# Patient Record
Sex: FEMALE
[De-identification: names, ages, dates, MRNs, and addresses within clinical notes are randomized per-mention and may not be internally consistent; named-entity substitution may affect disease eponyms.]

---

## 2018-07-19 ENCOUNTER — HOSPITAL ENCOUNTER (INPATIENT)
Dept: HOSPITAL 42 - ED | Age: 48
LOS: 5 days | Discharge: HOME | DRG: 88 | End: 2018-07-24
Attending: INTERNAL MEDICINE | Admitting: INTERNAL MEDICINE
Payer: COMMERCIAL

## 2018-07-19 VITALS — BODY MASS INDEX: 43 KG/M2

## 2018-07-19 DIAGNOSIS — G47.00: ICD-10-CM

## 2018-07-19 DIAGNOSIS — D64.9: ICD-10-CM

## 2018-07-19 DIAGNOSIS — I10: ICD-10-CM

## 2018-07-19 DIAGNOSIS — J45.901: ICD-10-CM

## 2018-07-19 DIAGNOSIS — F13.20: ICD-10-CM

## 2018-07-19 DIAGNOSIS — Z82.49: ICD-10-CM

## 2018-07-19 DIAGNOSIS — Z80.3: ICD-10-CM

## 2018-07-19 DIAGNOSIS — G89.29: ICD-10-CM

## 2018-07-19 DIAGNOSIS — M19.90: ICD-10-CM

## 2018-07-19 DIAGNOSIS — J44.1: Primary | ICD-10-CM

## 2018-07-19 DIAGNOSIS — E66.01: ICD-10-CM

## 2018-07-19 DIAGNOSIS — F11.20: ICD-10-CM

## 2018-07-19 DIAGNOSIS — E11.9: ICD-10-CM

## 2018-07-19 DIAGNOSIS — F43.23: ICD-10-CM

## 2018-07-19 LAB
ALBUMIN SERPL-MCNC: 4.7 G/DL (ref 3–4.8)
ALBUMIN/GLOB SERPL: 1.4 {RATIO} (ref 1.1–1.8)
ALT SERPL-CCNC: 55 U/L (ref 7–56)
ARTERIAL BLOOD GAS HEMOGLOBIN: 10.7 G/DL (ref 11.7–17.4)
ARTERIAL BLOOD GAS O2 SAT: 99.6 % (ref 95–98)
ARTERIAL BLOOD GAS PCO2: 32 MM/HG (ref 35–45)
ARTERIAL BLOOD GAS TCO2: 17.9 MMOL.L (ref 22–28)
AST SERPL-CCNC: 59 U/L (ref 14–36)
BASOPHILS # BLD AUTO: 0.03 K/MM3 (ref 0–2)
BASOPHILS NFR BLD: 0.2 % (ref 0–3)
BUN SERPL-MCNC: 21 MG/DL (ref 7–21)
CALCIUM SERPL-MCNC: 9.1 MG/DL (ref 8.4–10.5)
CK MB SERPL-MCNC: 4.8 NG/ML (ref 0–3.6)
CK MB SERPL-MCNC: 6.3 NG/ML (ref 0–3.6)
CK MB%: 0.4 % (ref 2.5–3)
EOSINOPHIL # BLD: 0.4 10*3/UL (ref 0–0.7)
EOSINOPHIL NFR BLD: 2.4 % (ref 1.5–5)
ERYTHROCYTE [DISTWIDTH] IN BLOOD BY AUTOMATED COUNT: 15.7 % (ref 11.5–14.5)
GFR NON-AFRICAN AMERICAN: 59
GRANULOCYTES # BLD: 13.31 10*3/UL (ref 1.4–6.5)
GRANULOCYTES NFR BLD: 74.4 % (ref 50–68)
HCO3 BLDA-SCNC: 16.9 MMOL/L (ref 21–28)
HGB BLD-MCNC: 11.1 G/DL (ref 12–16)
INHALED O2 CONCENTRATION: 50 %
LYMPHOCYTES # BLD: 2.9 10*3/UL (ref 1.2–3.4)
LYMPHOCYTES NFR BLD AUTO: 16.3 % (ref 22–35)
MCH RBC QN AUTO: 28.2 PG (ref 25–35)
MCHC RBC AUTO-ENTMCNC: 32 G/DL (ref 31–37)
MCV RBC AUTO: 88.1 FL (ref 80–105)
MONOCYTES # BLD AUTO: 1.2 10*3/UL (ref 0.1–0.6)
MONOCYTES NFR BLD: 6.7 % (ref 1–6)
O2 CAP BLDA-SCNC: 15.2 ML/DL (ref 16–24)
O2 CT BLDA-SCNC: 15.1 ML/DL (ref 15–23)
PH BLDA: 7.33 [PH] (ref 7.35–7.45)
PLATELET # BLD: 411 10^3/UL (ref 120–450)
PMV BLD AUTO: 8.5 FL (ref 7–11)
PO2 BLDA: 215 MM/HG (ref 80–100)
RBC # BLD AUTO: 3.94 10^6/UL (ref 3.5–6.1)
TROPONIN I SERPL-MCNC: < 0.01 NG/ML
TROPONIN I SERPL-MCNC: < 0.01 NG/ML
WBC # BLD AUTO: 17.9 10^3/UL (ref 4.5–11)

## 2018-07-19 RX ADMIN — Medication SCH MG: at 22:54

## 2018-07-19 NOTE — RAD
Date of service: 



07/19/2018



HISTORY:

r/o infiltrate  



COMPARISON:

No prior. 



FINDINGS:



LUNGS:

No active pulmonary disease.



PLEURA:

No significant pleural effusion identified, no pneumothorax apparent.



CARDIOVASCULAR:

Cardiomegaly.  No evidence of acute, significant cardiovascular 

disease. 



OSSEOUS STRUCTURES:

No significant abnormalities.



VISUALIZED UPPER ABDOMEN:

Normal.



OTHER FINDINGS:

None.



IMPRESSION:

No active disease.

## 2018-07-19 NOTE — CARD
--------------- APPROVED REPORT --------------





Date of service: 07/19/2018



EKG Measurement

Heart [id number]

OH [id number]

[id number]

[id number]

[id number]



<Conclusion>

Sinus tachycardia

Minimal voltage criteria for LVH, may be normal variant

Anteroseptal infarct, age undetermined

Abnormal ECG

## 2018-07-19 NOTE — PCM.RRT
<Osvaldo Israel - Last Filed: 07/19/18 20:42>





RRT Nurse Assessment





- Situation


Date: 07/19/18


Time RRT was called: 17:00


RRT Responder Arrival Time: 17:05


RRT Location:: [address] [place] [address]


RRT Reason for Call: Respiratory Distress


RRT Called By: RN





- IV


IV Inserted during RRT?: No





- Respiratory


Oxygen Delivery Method: Face Mask @%


Oxygen Flow Rate: 50


Received Nebulizer Treatments:: Yes


Was the Patient Ventilated with Bag/Mask 100% O2?: No


Secretions Suctioned?: No


Was the Patient Intubated?: No


Was the Patient Placed on a Ventilator?: No





- Medication


Medications Administered During RRT: Solumedrol.  Albuterol.  DuoNebs





- Diagnostic Test Ordered


EKG: Yes


Chest X-Ray: No


CT Scan: No





- Stat Labs Ordered


RRT Stat Labs Ordered: TROPONIN, ABG


CPR started during RRT?: No





- Vital Signs


Vital Sign: 


Rapid Response Vital Sign





Blood Pressure                   150/92


Pulse Rate                       120


Respiratory Rate                 24


Temperature                      98.2 F


Oxygen Saturation                100











- Time RRT Ended


Time RRT Ended: 19:25





- Vital Signs at end of RRT


Vital Signs at end of RRT: 


Rapid Response End Vital Sign





Blood Pressure                   148/89


Pulse Rate                       110


Respiratory Rate                 19


Temperature                      98.6 F











- Recommendations


Notifications: Attending Physician, Consultations, Family or Designated 

Caregiver





I.Reason for RRT





- A) Acute Change in Patient:


(Select all that apply): Staff member or family is worried about patient


Subjective: 


Rapid response team was called to evaluate pt for worsening shortness of 

breath. Per staff, pt was hyperventilating and was having difficulty breathing. 

Pt was a recent admission from the ED this am. Pt was complaining also of 

associated chest tightness, non-radiating. Pt was placed on venti mask at 50 L 

O2, was given duonebs and xopenex x3 with improvement. Pt received 1x dose 

solumedrrol 125 mg in ED earlier in day, then received 60 mg solumedrol x1 

during rapid response. Spoke with PMD Dr. Van, who agreed with intervention 

and recommended pt be evaluated by ICU intensivist. Dr. Hernandez notified. Dr. Alonzo present at bedside during rapid response. Per discussion with pt's son, 

pt is trying to kill herself and has been taking her late 's pain meds 

which he was prescribed for his cancer. Pt denied suicidal ideation/homicidal 

ideation at time of rapid response. 1:1 watch and psych consult ordered. EKG 

was sinus tachycardia, otherwise no acute St-T changes. 





VS: 154/98, P 131, SpO2 100% on 50 L venti mask


Gen: mild acute distress, hyperventilating at bedside


HEENT: NCAT, PERRLA


CV: normal s1/s2, tachycardic


Resp: diffuse wheezes heard in all lung fields, increased respirations (

improved s/p duonebs, oxygen, and xopenex)


Abd: soft, NTND, normal bowel sounds x4


Ext: normal capillary refill, 1+ nonpitting edema in lower exts b/l





 





- Neurological Status


(Select all that apply): Alert, Responsive, Oriented, Verbal, Follows Commands





- Respiratory


Oxygen Delivery Method: Face Mask @%


Oxygen Flow Rate: 50





- Constitutional


Additional Comments: 


Mild acute distress





- Head


Head Exam: ATRAUMATIC, NORMAL INSPECTION, NORMOCEPHALIC





- Eyes


Eye Exam: EOMI, Normal appearance, PERRL





- Respiratory Exam


Additional comments: 


Rapid breathing pattern, wheezes auscultated in all lung fields





- Cardiovascular Exam


Cardiovascular Exam: Tachycardia, +S1, +S2





- GI/Abdominal Exam


GI & Abdominal Exam: Soft, Normal Bowel Sounds





- Neurological Exam


Neurological Exam: Alert, Awake, Oriented x3





- Extremities Exam


Extremities Exam: Full ROM, Normal Capillary Refill, Normal Inspection


Additional comments: 


1+ lower extremity edema b/l, nonpitting





Plan





- Assessment of Findings&Treatment Plan


47 y o female PMhx HTN, asthma, admitted for asthma exacerbation, presenting 

with worsening shortness of breath. Likely 2/2 asthma exacerbation vs. anxiety 

attack. Pt's clinical status improved s/p duonebs x3 and xopenex. EKG showed 

sinus tachycardia. Pt placed on 1:1 watch and psych consult ordered. ABG 

performed, pH 7.33, pC02 32, pO2 215, HCO3 16.9. Serial troponins ordered, to 

be trended overnight. Plan d/w Dr. Alonzo, attending. Pending ICU consult as per 

Dr. Van, Dr. Hernandez aware. Dr. Van notified, agrees with management. 





<Luz Maria Alonzo - Last Filed: 07/19/18 22:49>





RRT Nurse Assessment





- Vital Signs


Vital Sign: 


Rapid Response Vital Sign





Blood Pressure                   150/92


Pulse Rate                       120


Respiratory Rate                 24


Temperature                      98.2 F


Oxygen Saturation                100











- Vital Signs at end of RRT


Vital Signs at end of RRT: 


Rapid Response End Vital Sign





Blood Pressure                   148/89


Pulse Rate                       110


Respiratory Rate                 19


Temperature                      98.6 F











Attending/Attestation





- Attestation


I have personally seen and examined this patient.: Yes


I have fully participated in the care of the patient.: Yes


I have reviewed all pertinent clinical information, including history, physical 

exam and plan: Yes


Notes (Text): 





07/19/18 22:49


Patient was seen.


Agree with documentation by resident.

## 2018-07-19 NOTE — ED PDOC
Arrival/HPI





- General


Time Seen by Provider: 07/19/18 13:28


Historian: Patient





- History of Present Illness


Narrative History of Present Illness (Text): 





07/19/18 13:29





47 year old female, with past medical history of asthma, presents to the ED 

complaining of shortness of breath associated with cough since prior to 

arrival. [place], 

prompting her to present to the Ed for medical evaluation. Patient states her 

last episode of asthma exacerbation was many years ago and is currently not on 

any breathing treatment. Patient denies any chest pain, fever, chills, nausea, 

vomiting, abdominal pain, palpitations or any other complaints. Patient denies 

smoking cigarettes. 





Time/Duration: Prior to Arrival


Symptom Onset: Gradual


Symptom Course: Unchanged


Activities at Onset: Light


Context: Other ([facility])





Past Medical History





- Provider Review


Nursing Documentation Reviewed: Yes





- Infectious Disease


Hx of Infectious Diseases: None





- Tetanus Immunization


Tetanus Immunization: Unknown





- Past Medical History


Past Medical History: No Previous





- Psychiatric


Hx Depression: No


Hx Emotional Abuse: No


Hx Physical Abuse: No


Hx Substance Use: No





- Past Surgical History


Past Surgical History: No Previous





- Suicidal Assessment


Feels Threatened In Home Enviroment: No





Family/Social History





- Physician Review


Nursing Documentation Reviewed: Yes


Family/Social History: No Known Family HX


Hx Alcohol Use: No


Hx Substance Use: No


Hx Substance Use Treatment: No





Allergies/Home Meds


Allergies/Adverse Reactions: 


Allergies





No Known Allergies Allergy (Verified 04/14/13 05:37)


 











Review of Systems





- Physician Review


All systems were reviewed & negative as marked: Yes





- Review of Systems


Constitutional: absent: Fevers


Respiratory: SOB, Cough


Cardiovascular: absent: Chest Pain, Palpitations


Gastrointestinal: absent: Abdominal Pain, Nausea, Vomiting





Physical Exam


Vital Signs Reviewed: Yes


Vital Signs











  Temp Pulse Resp BP Pulse Ox


 


 07/19/18 17:50   115 H  18  145/87  98


 


 07/19/18 17:04   109 H  18  149/90  93 L


 


 07/19/18 14:51   112 H  18  152/94 H  93 L


 


 07/19/18 13:50    18  


 


 07/19/18 13:40  98.7 F    


 


 07/19/18 13:32   123 H  24  164/99 H  100











Temperature: Afebrile


Blood Pressure: Hypertensive


Pulse: Tachycardic


Respiratory Rate: Normal


Appearance: Positive for: Well-Appearing, Non-Toxic


Pain Distress: None


Mental Status: Positive for: Alert and Oriented X 3





- Systems Exam


Head: Present: Atraumatic, Normocephalic


Pupils: Present: PERRL


Extroacular Muscles: Present: EOMI


Conjunctiva: Present: Normal


Mouth: Present: Moist Mucous Membranes


Neck: Present: Normal Range of Motion


Respiratory/Chest: Present: Good Air Exchange, Wheezes (prolonged expiratory 

wheeze ).  No: Respiratory Distress, Accessory Muscle Use


Cardiovascular: Present: Regular Rate and Rhythm, Normal S1, S2.  No: Murmurs


Abdomen: No: Tenderness, Distention, Peritoneal Signs


Back: Present: Normal Inspection


Upper Extremity: Present: Normal Inspection.  No: Cyanosis, Edema


Lower Extremity: Present: Normal Inspection.  No: Edema


Neurological: Present: GCS=15, CN II-XII Intact, Speech Normal


Skin: Present: Warm, Dry, Normal Color.  No: Rashes


Psychiatric: Present: Alert, Oriented x 3, Normal Insight, Normal Concentration





Medical Decision Making


ED Course and Treatment: 





07/19/18 13:28


Impression: 47 year old female presents to the ED for shortness of breath 

associated with cough since prior to arrival. 





Plan:


-- EKG


-- Labs


-- CXR


-- Albuterol 


-- solumedrol





Progress Note: 





07/19/18 13:52


EKG: Ordered, reviewed, and independently interpreted the EKG.


Rate : 122 BPM


Rhythm : Sinus Tachycardia


Interpretation : Normal axis; normal interval; non-specific ST/T wave changes. 





07/19/18 16:49


Patient ambulating in ED and talking on phone.  States she is feeling better.  

Patient continues to wheeze.  Discussed case with Dr. Van, who is aware and 

agrees with Emergency department management plan to admit patient to remote 

Telemetry for further observation.





- Lab Interpretations


Lab Results: 








 07/19/18 13:40 





 07/19/18 13:40 





 Lab Results





07/19/18 13:40: Sodium 140, Potassium 4.5, Chloride 102, Carbon Dioxide 23, 

Anion Gap 19, BUN 21, Creatinine 1.0, Est GFR (African Amer) > 60, Est GFR (Non-

Af Amer) 59, Random Glucose 118 H, Calcium 9.1, Magnesium 2.2, Total Bilirubin 

0.6, AST 59 H, ALT 55, Alkaline Phosphatase 103, Lactate Dehydrogenase 872 H, 

Total Creatine Kinase 1528 H, CK-MB (CK-2) 6.3 H, CK-MB (CK-2) % 0.4 L, 

Troponin I < 0.01, Total Protein 8.1, Albumin 4.7, Globulin 3.4, Albumin/

Globulin Ratio 1.4


07/19/18 13:40: WBC 17.9 H, RBC 3.94, Hgb 11.1 L, Hct 34.7 L, MCV 88.1, MCH 28.2

, MCHC 32.0, RDW 15.7 H, Plt Count 411, MPV 8.5, Gran % 74.4 H, Lymph % (Auto) 

16.3 L, Mono % (Auto) 6.7 H, Eos % (Auto) 2.4, Baso % (Auto) 0.2, Gran # 13.31 H

, Lymph # (Auto) 2.9, Mono # (Auto) 1.2 H, Eos # (Auto) 0.4, Baso # (Auto) 0.03











- RAD Interpretation


Narrative RAD Interpretations (Text): 





07/19/18 14:35


Chest X-ray reviewed by radiologist, shows: no active disease.


Radiology Orders: 








07/19/18 13:29


CHEST PORTABLE [RAD] Stat 











: Radiologist





- EKG Interpretation


Interpreted by ED Physician: Yes


Type: 12 lead EKG





- Medication Orders


Current Medication Orders: 








Ipratropium Bromide (Atrovent)  0.5 mg IH [id number] PRN


   PRN Reason: Shortness of Breath


Levalbuterol HCl (Xopenex)  1.25 mg IH [id number] JEF


Levalbuterol HCl (Xopenex)  1.25 mg IH [id number] PRN


   PRN Reason: Shortness of Breath


Methylprednisolone (Solu-Medrol)  60 mg IVP Q12 JEF


Oxycodone/Acetaminophen (Percocet 5/325 Mg Tab)  1 tab PO Q6H PRN


   PRN Reason: Pain, moderate (4-7)


   Stop: 07/22/18 19:47





Discontinued Medications





Albuterol/Ipratropium (Duoneb 3 Mg/0.5 Mg (3 Ml) Ud)  3 ml IH STAT STA


   Stop: 07/19/18 13:29


   Last Admin: 07/19/18 13:41  Dose: 3 ml





Alprazolam (Xanax)  0.5 mg PO STAT STA


   PRN Reason: Protocol


   Stop: 07/19/18 19:25


   Last Admin: 07/19/18 19:36  Dose: 0.5 mg





Behavioural


 Document     07/19/18 19:36  FC  (Rec: 07/19/18 19:36  FC  [id number])


     Maintenance


      Maintenance Dose                           No


     Nonmedicinal


      Nonmedicinal Interventions                 Redirect


     Behavior


      Behavior for Medication:                   Anxiety


                                                 Continuous crying/screaming/


                                                 yelling


Re-Assess: Reassess Psych Meds


 Document     07/19/18 20:36  FC  (Rec: 07/19/18 20:52  FC  [id number])


      Reassess Psych Med                         Effective





Magnesium 2 gm/50 ml NS (Magnesium Sulfate 2 Gm/50 Ml Ns)  2 gm in 50 mls @ 102 

mls/hr IVPB ONCE ONE


   Stop: 07/19/18 13:57


   Last Admin: 07/19/18 14:17  Dose: 102 mls/hr





eMAR Start Stop


 Document     07/19/18 14:17  SF  (Rec: 07/19/18 14:17  SF  [id number])


     Intravenous Solution


      Start Date                                 07/19/18


      Start Time                                 14:17


      End Date                                   07/19/18


      End time                                   14:47


      Total Infusion Time                        30





Azithromycin (Zithromax 500mg In Ns)  500 mg in 250 mls @ 167 mls/hr IVPB STAT 

STA


   PRN Reason: Protocol


   Stop: 07/19/18 18:26


   Last Admin: 07/19/18 17:49  Dose: 167 mls/hr





eMAR Start Stop


 Document     07/19/18 17:49  SF  (Rec: 07/19/18 17:50  SF  [facility])


     Intravenous Solution


      Start Date                                 07/19/18


      Start Time                                 17:49


      End Date                                   07/19/18


      End time                                   19:20


      Total Infusion Time                        91





Levalbuterol HCl (Xopenex)  1.25 mg IH STAT STA


   Stop: 07/19/18 19:17


   Last Admin: 07/19/18 19:28  Dose: 1.25 mg





Methylprednisolone (Solu-Medrol)  125 mg IVP STAT STA


   Stop: 07/19/18 13:29


   Last Admin: 07/19/18 13:41  Dose: 125 mg





IVP Administration


 Document     07/19/18 13:41  GMD  (Rec: 07/19/18 13:41  GMD  [id number])


     Charges for Administration


      # of IVP Administrations                   1





Methylprednisolone (Solu-Medrol)  60 mg IVP STAT STA


   Stop: 07/19/18 19:10


   Last Admin: 07/19/18 19:26  Dose: 60 mg





IVP Administration


 Document     07/19/18 19:26  RAMONA  (Rec: 07/19/18 19:26  [facility]  [id number])


     Charges for Administration


      # of IVP Administrations                   1





Pneumococcal Polyvalent Vaccine (Pneumovax 23 Vaccine)  0.5 ml IM .ONCE ONE


   Stop: 07/19/18 21:19











- Scribe Statement


The provider has reviewed the documentation as recorded by the Scribe


Lito Perez.





All medical record entries made by the Scribe were at my direction and 

personally dictated by me. I have reviewed the chart and agree that the record 

accurately reflects my personal performance of the history, physical exam, 

medical decision making, and the department course for this patient. I have 

also personally directed, reviewed, and agree with the discharge instructions 

and disposition.





Disposition/Present on Arrival





- Present on Arrival


Any Indicators Present on Arrival: No


History of DVT/PE: No


History of Uncontrolled Diabetes: No


Urinary Catheter: No


History Surgical Site Infection Following: None





- Disposition


Have Diagnosis and Disposition been Completed?: Yes


Diagnosis: 


 Asthma exacerbation





Disposition: HOSPITALIZED


Disposition Time: 15:00


Condition: STABLE

## 2018-07-19 NOTE — CP.PCM.CON
History of Present Illness





- History of Present Illness


History of Present Illness: 





ICU consult note for Dr. Mary Acosta PGY2





Chief Complaint: Dyspnea


HPI: Patient is a 47 F with past history significant for asthma for which she 

was never intubated and currently not medicated for as well as present history 

of opioid abuse presenting with complains of dyspnea and productive cough which 

has been going on for the past few days. Patient states her symptoms have been 

going on for the past few days but were exacerbated today during the burial of 

her . In the ED patient was given STAT duonebs, magnesium, 125 mg 

solumedrol IVP, and one dose of azithromycin. As per patient's son, patient has 

been addicted to opioids since her  was receiving treatment for his 

cancer and is currently consuming xanax/oxycodone/percocet. Patient was taking 

's pain meds in excess as of late in attempts to commit suicide. Due to 

patient's acute respiratory distress, Rapid response was called for which 

patient was given 3 rounds of xopenex and 60 mg IVP solumedrol and Xanax which 

appeared to have help alleviate patient's symptoms. 

















Review of Systems





- Constitutional


Constitutional: absent: Anorexia, Chills, Fever





- EENT


Eyes: absent: Blurred Vision





- Cardiovascular


Cardiovascular: absent: Chest Pain (chest tightness)





- Respiratory


Respiratory: Cough, Dyspnea, Chest Congestion, Change in Mucous Color (green)





- Gastrointestinal


Gastrointestinal: absent: Abdominal Pain, Bloating





- Genitourinary


Genitourinary: absent: Hematuria





- Neurological


Neurological: absent: Dizziness, Numbness





- Psychiatric


Psychiatric: Anxiety, Suicidal Ideation





- Endocrine


Endocrine: Fatigue





Past Patient History





- Infectious Disease


Hx of Infectious Diseases: None





- Tetanus Immunizations


Tetanus Immunization: Unknown





- Past Social History


Smoking Status: Never Smoked





- CARDIAC


Hx Hypertension: Yes





- PULMONARY


Hx Asthma: Yes





- MUSCULOSKELETAL/RHEUMATOLOGICAL


Other/Comment: R leg problem





- PSYCHIATRIC


Hx Depression: No


Hx Emotional Abuse: No


Hx Physical Abuse: No


Hx Substance Use: No





Meds


Allergies/Adverse Reactions: 


 Allergies











Allergy/AdvReac Type Severity Reaction Status Date / Time


 


No Known Allergies Allergy   Verified 04/14/13 05:37














- Medications


Medications: 


 Current Medications





Ipratropium Bromide (Atrovent)  0.5 mg IH [id number] PRN


   PRN Reason: Shortness of Breath


Levalbuterol HCl (Xopenex)  1.25 mg IH [id number] JEF


Levalbuterol HCl (Xopenex)  1.25 mg IH [id number] PRN


   PRN Reason: Shortness of Breath


Methylprednisolone (Solu-Medrol)  60 mg IVP Q12 JEF


Oxycodone/Acetaminophen (Percocet 5/325 Mg Tab)  1 tab PO Q6H PRN


   PRN Reason: Pain, moderate (4-7)


   Stop: 07/22/18 19:47











Physical Exam





- Head Exam


Head Exam: ATRAUMATIC, NORMAL INSPECTION, NORMOCEPHALIC





- Eye Exam


Eye Exam: EOMI, Normal appearance





- ENT Exam


ENT Exam: Mucous Membranes Moist





- Respiratory Exam


Respiratory Exam: Rhonchi, Wheezes.  absent: Clear to Auscultation Bilateral, 

NORMAL BREATHING PATTERN





- Cardiovascular Exam


Cardiovascular Exam: Tachycardia, REGULAR RHYTHM, +S1, +S2





- GI/Abdominal Exam


GI & Abdominal Exam: Normal Bowel Sounds, Soft





- Extremities Exam


Extremities exam: Positive for: normal inspection





- Back Exam


Back exam: NORMAL INSPECTION





- Neurological Exam


Neurological exam: Alert, Oriented x3





- Psychiatric Exam


Psychiatric exam: Anxious





- Skin


Skin Exam: Normal Color, Warm





Results





- Vital Signs


Recent Vital Signs: 


 Last Vital Signs











Temp  98.5 F   07/19/18 18:02


 


Pulse  114 H  07/19/18 18:02


 


Resp  18   07/19/18 18:02


 


BP  145/87   07/19/18 17:50


 


Pulse Ox  97   07/19/18 18:02














- Labs


Result Diagrams: 


 07/19/18 13:40





 07/19/18 13:40


Labs: 


 Laboratory Results - last 24 hr











  07/19/18 07/19/18





  18:15 19:25


 


pCO2  32 L 


 


pO2  215.0 H 


 


HCO3  16.9 L 


 


ABG pH  7.33 L 


 


ABG Total CO2  17.9 L 


 


ABG O2 Saturation  99.6 H 


 


ABG O2 Content  15.1 


 


ABG Base Excess  -8.1 L 


 


ABG Hemoglobin  10.7 L 


 


ABG Carboxyhemoglobin  1.4 


 


POC ABG HHb (Measured)  0.4 


 


ABG Methemoglobin  1.5 


 


ABG O2 Capacity  15.2 L 


 


Hgb O2 Saturation  96.8 


 


FiO2  50.0 


 


Lactate Dehydrogenase   828 H


 


Total Creatine Kinase   1320 H


 


CK-MB (CK-2)   4.8 H


 


CK-MB (CK-2) %   Cancelled


 


Troponin I   < 0.01














Assessment & Plan





- Assessment and Plan (Free Text)


Assessment: 





Patient is a 47 F with past history significant for asthma for which she was 

never intubated and currently not medicated for as well as present history of 

opioid abuse presenting with complains of dyspnea and productive cough which 

has been going on for the past few days.


Plan: 





Neurological/Psychiatric


-AAO x3


-Current hx of drug abuse (xanax,oxycodone,percocet)


-Psychiatry consulted


-Hx of suicidal ideation; 1:1 sitter


-Continue 1 dose of percocet q6 so patient does not go through withdrawals





Cardiovascular


-EKG from admission reveals Sinus tachycardia, LVH. Repeat EKG reveals no acute 

changes


-Troponin negative x 2





Respiratory


-ABG on room air: PCO2 32, pO2 215, HCO3 16.9, pH 7.33


-99% on 2L NC


-Continue with xopenex, acetylcysteine, atrovent, solu-medrol, robitussin


-Continue with azithromycin 





Infectious disease


-Leukocytosis 17.9


-Continue with azthromycin


-Procalcitonin ordered; results pending


-Urinalysis ordered; results pending





Gastroinestinal 


-Protonix for GI ppx


-Heart healthy diet





Hematological


-Heparin for DVT ppx


-Monitor H&H currently 11.1&34.7





Nephrological


-CPK >1000


-Will treat with NS @100





Patient does not need ICU admission at this time and will remain on remote 

telemetry for continued management and treamtnet.

## 2018-07-20 LAB
APPEARANCE UR: CLEAR
BILIRUB UR-MCNC: NEGATIVE MG/DL
COLOR UR: YELLOW
GLUCOSE UR STRIP-MCNC: 250 MG/DL
LEUKOCYTE ESTERASE UR-ACNC: NEGATIVE LEU/UL
PH UR STRIP: 6 [PH] (ref 4.7–8)
PROT UR STRIP-MCNC: NEGATIVE MG/DL
RBC # UR STRIP: NEGATIVE /UL
SP GR UR STRIP: 1.02 (ref 1–1.03)
UROBILINOGEN UR STRIP-ACNC: 0.2 E.U./DL

## 2018-07-20 RX ADMIN — Medication SCH MG: at 07:34

## 2018-07-20 RX ADMIN — Medication SCH MG: at 23:20

## 2018-07-20 RX ADMIN — AZITHROMYCIN DIHYDRATE SCH MLS/HR: 500 INJECTION, POWDER, LYOPHILIZED, FOR SOLUTION INTRAVENOUS at 10:19

## 2018-07-20 RX ADMIN — Medication SCH MG: at 20:11

## 2018-07-20 RX ADMIN — Medication SCH: at 04:16

## 2018-07-20 RX ADMIN — INSULIN HUMAN SCH: 100 INJECTION, SOLUTION PARENTERAL at 22:29

## 2018-07-20 RX ADMIN — GUAIFENESIN AND DEXTROMETHORPHAN PRN ML: 100; 10 SYRUP ORAL at 10:26

## 2018-07-20 RX ADMIN — Medication SCH MG: at 11:01

## 2018-07-20 RX ADMIN — ACETYLCYSTEINE SCH ML: 200 INHALANT RESPIRATORY (INHALATION) at 23:20

## 2018-07-20 RX ADMIN — GUAIFENESIN AND DEXTROMETHORPHAN PRN ML: 100; 10 SYRUP ORAL at 00:14

## 2018-07-20 RX ADMIN — ACETYLCYSTEINE SCH ML: 200 INHALANT RESPIRATORY (INHALATION) at 20:11

## 2018-07-20 RX ADMIN — ACETYLCYSTEINE SCH ML: 200 INHALANT RESPIRATORY (INHALATION) at 01:07

## 2018-07-20 RX ADMIN — Medication SCH MG: at 16:08

## 2018-07-20 RX ADMIN — INSULIN HUMAN SCH: 100 INJECTION, SOLUTION PARENTERAL at 16:34

## 2018-07-20 RX ADMIN — GUAIFENESIN AND DEXTROMETHORPHAN PRN ML: 100; 10 SYRUP ORAL at 22:31

## 2018-07-20 NOTE — CARD
--------------- APPROVED REPORT --------------





Date of service: 07/19/2018



EKG Measurement

Heart [id number]

[place] [id number]

[id number]

[id number]

[id number]



<Conclusion>

Sinus tachycardia

Septal infarct, age Probably Old.

## 2018-07-21 RX ADMIN — Medication SCH MG: at 23:40

## 2018-07-21 RX ADMIN — PANTOPRAZOLE SODIUM SCH MG: 40 TABLET, DELAYED RELEASE ORAL at 08:39

## 2018-07-21 RX ADMIN — OXYCODONE HYDROCHLORIDE AND ACETAMINOPHEN PRN TAB: 5; 325 TABLET ORAL at 17:40

## 2018-07-21 RX ADMIN — Medication SCH: at 03:22

## 2018-07-21 RX ADMIN — Medication SCH MG: at 07:17

## 2018-07-21 RX ADMIN — OXYCODONE HYDROCHLORIDE AND ACETAMINOPHEN PRN TAB: 5; 325 TABLET ORAL at 11:58

## 2018-07-21 RX ADMIN — ACETYLCYSTEINE SCH: 200 INHALANT RESPIRATORY (INHALATION) at 19:01

## 2018-07-21 RX ADMIN — INSULIN HUMAN SCH: 100 INJECTION, SOLUTION PARENTERAL at 09:32

## 2018-07-21 RX ADMIN — ACETYLCYSTEINE SCH: 200 INHALANT RESPIRATORY (INHALATION) at 02:01

## 2018-07-21 RX ADMIN — ACETYLCYSTEINE SCH ML: 200 INHALANT RESPIRATORY (INHALATION) at 14:34

## 2018-07-21 RX ADMIN — INSULIN HUMAN SCH: 100 INJECTION, SOLUTION PARENTERAL at 12:46

## 2018-07-21 RX ADMIN — Medication SCH MG: at 18:36

## 2018-07-21 RX ADMIN — METHYLPREDNISOLONE SODIUM SUCCINATE SCH MG: 40 INJECTION, POWDER, FOR SOLUTION INTRAMUSCULAR; INTRAVENOUS at 22:22

## 2018-07-21 RX ADMIN — GUAIFENESIN AND DEXTROMETHORPHAN PRN ML: 100; 10 SYRUP ORAL at 15:35

## 2018-07-21 RX ADMIN — INSULIN HUMAN SCH: 100 INJECTION, SOLUTION PARENTERAL at 17:16

## 2018-07-21 RX ADMIN — Medication SCH MG: at 14:35

## 2018-07-21 RX ADMIN — Medication SCH MG: at 11:06

## 2018-07-21 RX ADMIN — INSULIN HUMAN SCH: 100 INJECTION, SOLUTION PARENTERAL at 22:23

## 2018-07-21 RX ADMIN — GUAIFENESIN AND DEXTROMETHORPHAN PRN ML: 100; 10 SYRUP ORAL at 03:18

## 2018-07-21 RX ADMIN — ACETYLCYSTEINE SCH ML: 200 INHALANT RESPIRATORY (INHALATION) at 18:35

## 2018-07-21 RX ADMIN — PANTOPRAZOLE SODIUM SCH: 40 TABLET, DELAYED RELEASE ORAL at 09:39

## 2018-07-21 RX ADMIN — AZITHROMYCIN DIHYDRATE SCH MLS/HR: 500 INJECTION, POWDER, LYOPHILIZED, FOR SOLUTION INTRAVENOUS at 09:31

## 2018-07-21 RX ADMIN — GUAIFENESIN AND DEXTROMETHORPHAN PRN ML: 100; 10 SYRUP ORAL at 22:20

## 2018-07-21 RX ADMIN — ACETYLCYSTEINE SCH ML: 200 INHALANT RESPIRATORY (INHALATION) at 07:17

## 2018-07-21 NOTE — PN
DATE:  07/21/2018



PULMONARY/MEDICINE PROGRESS NOTE



REFERRING PHYSICIAN:  Aries Van MD



SUBJECTIVE:  She is lying in the bed, head at 45 degrees.  Feels better. 

Decreased cough and shortness of breath.  No nausea.  No vomiting, no

diarrhea.  No leg pain or leg swelling.



OBJECTIVE

GENERAL:  In no acute distress.

VITAL SIGNS:  Temperature is 98, heart rate is 85, respiratory rate is 20,

blood pressure 177/114, pulse ox 100% on nasal cannula.

HEENT:  Moist mucous membrane.  Crowded airway.  Mallampati score is 4.

NECK:  Supple.  No JVD.

LUNGS:  Have a few scattered rhonchi.

HEART:  S1 and S2.

ABDOMEN:  Soft, nontender.  No organomegaly.

EXTREMITIES:  No edema.

NEUROLOGICAL:  Awake and alert.  Follows simple command.



MEDICATIONS:  She is on Mucomyst 20% inhaled every 6 hours, lorazepam 0.5

mg three times a day p.r.n., _____ mg every 6 hours p.r.n., clonidine 0.1

mg every 6 hours p.r.n., Claritin 10 mg daily, Elavil 25 mg at bedtime

p.r.n., heparin 5000 units subcu every 12 hours, insulin coverage, Percocet

5/325 one tablet every 6 hours p.r.n., Protonix 40 mg a.c.b. Singulair 10

mg daily, Robitussin DM 10 mL every 4 hours p.r.n., Solu-Medrol 60 mg every

12 hours, Tenormin 25 mg twice a day, Xopenex inhaled every 6 hours p.r.n.,

Zithromax 500 mg daily



LABORATORY DATA:  Shows blood sugar this morning is 104, procalcitonin is

0.12.



IMPRESSION AND PLAN:  Exacerbation of chronic obstructive lung disease,

anxiety disorder, adjustment disorder.  Pulmonary point of view, she is

doing okay.  I am going to cut down steroids to 40 mg every 12 hours,

continue antibiotics, gastric prophylaxis, deep venous thrombosis

prophylaxis, Psychiatry followup.



Thank you and we will follow with you.





________________________________________

Yvette Pena MD



DD:  07/21/2018 16:59:47

DT:  07/21/2018 18:26:41

Job # [id number]

## 2018-07-21 NOTE — HP
MAIN COMPLAINT:  Dyspnea.



HISTORY OF PRESENT ILLNESS:  This is a 47-year-old female with a history of

asthma.  She came in with shortness of breath, wheezing, coughing, and the

patient does note she has been sick for the last couple of days, got worse,

and came to the emergency room for evaluation.  She does have some cough,

but she denied any nausea, any vomiting, any diarrhea, any fever or chills.

The patient does have a history of asthma; however, her asthma is not

severe.  She has been off any medications for years, and for being

intubated or in the ICU due to her asthma.  The patient denied any allergic

reaction to any chemicals or perfumes.  The patient noted that she does

have a lot of emotional stress due to loss of her  recently.



FAMILY HISTORY:  Grandfather has coronary artery disease.  Also, there is a

family history of breast CA.



PAST MEDICAL HISTORY:  As I mentioned, asthma.  She also has meniscus

injury, and recently she has been suffering from anxiety, for which she was

taking the 's oxycodone for her knee pain, 30 mg three times or four

times depending on her pain.  She also was taking his Xanax 2 mg or Ativan

2 mg everyday, I think Xanax 2 mg everyday, for her chronic anxiety from

the 's medications.  The patient also has been taking sleeping

pills, Elavil 25 mg at night from 's medications.



REVIEW OF SYSTEMS:  As in the present illness, she does have knee pain and

anxiety.  Denied any chest pain, any cardiac disease, any GI symptoms or

any urological symptoms.



ALLERGIES:  NO KNOWN ALLERGY.



SOCIAL HISTORY:  She does not smoke or drink, or used any other except in

recent years from 's medications.



HOME MEDICATIONS:  As I mentioned, oxycodone and Xanax from the ,

and he reported also tramadol.



PHYSICAL EXAMINATION

GENERAL:  The patient was seen initially by our physician; however, she did

have a lot of anxiety associated with her asthma.  Now, she is calm, quite

and seems comfortable, no respiratory distress.  She is alert, awake,

oriented x3.

VITAL SIGNS:  Temperature is 97.8, heart rate 99, blood pressure 136/80,

respirations 20, saturating is 95% on room air and she has been 99% on 2

liters before that.

HEAD AND NECK:  Normal.  No JVD.  No thyromegaly.

CHEST:  Few rhonchi; otherwise, chest is clear, good air entry bilaterally.

CARDIAC:  First sound and second sound normal.  No murmur, rub or gallop.

ABDOMEN:  Obese, nontender.

EXTREMITIES:  No edema.

NEUROLOGIC:  Normal.



LABORATORY DATA:  When she came in, white count 17.9, hemoglobin 11.1,

hematocrit 34.7, platelets 411.  Chemistry:  Sodium 140, potassium 4.5,

chloride 102, bicarb 23, BUN 21, creatinine is 1.  The patient's blood

sugar 118, magnesium 2.2, total bilirubin 0.6, AST 59, ALT 55, alkaline

phosphatase 103.  The patient has CPK of 1528, and CK-MB is 6.3.  Troponin

x2 are negative.  Procalcitonin was done, was 0.12 which is low.  The

patient also had a chest x-ray which shows no active disease.  She also had

an EKG which shows sinus tachycardia, minimal voltage criteria for LVH,

anteroseptal infarct age undermined, abnormal EKG.



IMPRESSION AND PLAN:

1.  A 47-year-old female with history of asthma with recent emotional

distress, probably reactive depression, on Xanax 2 mg daily, not using her

asthma pump on the regular basis, came in with acute asthma exacerbation. 

Plan is to admit the patient to telemetry and IV Solu-Medrol; the patient

received _____ mg IV now, and we will continue 60 IV every 6, insulin

coverage, inhaled bronchodilator, Xopenex, Combivent, and we will follow up

clinically.

2.  Chronic anxiety, benzodiazepine dependence.  We will maintain her

benzodiazepine.  Psychiatry consult for possible depression, and will

follow up with the psychiatrist.

3.  Morbid obesity.  The patient needs to avoid excessive calorie intake. 

Weight loss is recommended to prevent further medical illnesses.

4.  Possible obstructive sleep apnea.  The patient will benefit from sleep

study as outpatient.

5.  Chronic meniscus injury with knee pain.  She does take pain medicine at

the home.  Probably, the patient is narcotic or opioid dependent, she may

benefit from Suboxone therapy or more with psychiatric consultation.

6.  Anemia.  The patient needs further evaluation of her anemia, underlying

etiology, and may benefit from GI evaluations, colonoscopy and upper

endoscopy.  The patient will benefit from mammogram on a regular basis.  At

this time, continue current medications, Zithromax IV, Xopenex,

Solu-Medrol, IV fluid and given Percocet p.r.n. for pain.  She is on deep

venous thrombosis prophylaxis and follow up clinically.  She is also on

Mucomyst and Ativan 0.5 t.i.d.





__________________________________________

Aries Van MD



DD:  07/21/2018 8:09:37

DT:  07/21/2018 9:47:46

Job # [id number]

## 2018-07-21 NOTE — CON
DATE:  07/20/2018



HISTORY OF PRESENT ILLNESS:  Shortly, the patient is a 47-year-old

 female with not known previous psychiatric history, multiple

medical problems including asthma.  The patient [place]

 when symptoms of asthma worsened and the patient came to the

hospital for help.  The patient was admitted on the medical side for

evaluation of asthma exacerbation.  The patient was in rapid response

because the patient was not able to breathe.  This writer consult was

called for evaluation of possible suicidal ideation.  The patient was seen

and examined.  The patient presented to be alert and oriented.  The patient

reported that she feels a little bit better.  The patient currently is on

one-to-one.  The patient reported that yesterday it was very hard day for

her.  The patient said that she never verbalized any thoughts of killing

herself and she does care about her kids who, two of them are adults 32 and

25 and her daughter is 12 years old.  The patient reported that she

promised her  that she would raise kids and she will take good care

of them.  The patient adamantly denied that she wanted to kill herself. 

The patient reported that yesterday her sister who has "a lot of problems

maybe seen in the cemetery and she was very upset."  The patient presented

to be emotional, crying, but not appears to be severely depressed.  The

patient seems to be adjusting to her laws relatively well.  The patient

reported that her  was dying from cancer for the past 4 years and

that was kind of expected.  At the same time, the patient reported that she

was with her  for more than 30 years and she is grieving, which is

expected.  The patient denied hearing voices, denied seeing things, denied

paranoid ideation.  Medical team raised concerns about possible overdosing

on the patient's 's pain medication.  The patient adamantly denied

of doing so.  The patient said that she is prescribed painkillers for her

arthritis.  The patient gave permission to speak to her son.  Intern was

advised to give a call and obtain collateral information up until then. 

The patient requires one-to-one for observation.  The patient reported no

history of mental illness.  The patient denied that she ever tried to kill

herself.  The patient denied any intent or plan to kill herself right now. 

The patient denied hearing any voices, seeing things.  Denied using any

drugs.  The patient reports that she feels anxious now, but denied history

of anxiety.  No history of admission to the psychiatric inpatient unit. 

Denied history of suicidal attempt.



MEDICATIONS:  Reviewed.  The patient is on _____, atenolol, Zithromax,

Robitussin, heparin, Humulin, Atrovent, _____, Solu-Medrol, Percocet as

needed, Protonix, sodium chloride.



LABORATORY DATA:  Reviewed.  WBC cells 17.9, hemoglobin and hematocrit 11.1

and 34.7.  Blood gas reviewed.  Chemistry reviewed.  The patient's lactate

dehydrogenase is 828, creatine kinase is 1320.  Urinalysis reviewed. 

Toxicology positive for opioids and benzodiazepines.



MENTAL STATUS EXAMINATION:  The patient appears to be alert and oriented,

pleasant, cooperative, at times tearful, which is expected.  Mood described

as "I am sad.  I lost my , but I am fine."  Affect was tearful, but

reactive.  Mood congruent.  Thought process coherent and goal directed. 

Thought content, the patient denied visual, auditory or tactile

hallucinations.  Denied paranoid ideation.  The patient denied thoughts of

harming herself or others.  Denied intent or plan.  Insight and judgment

seems to be fair.  Impulses are well controlled.



IMPRESSION:  Most likely, this is adjustment disorder with depressed and

anxious mood.



PLAN:  Continue one-to-one up until collateral information will be obtained

from the family.  Ativan as needed for anxiety.  The patient denied feeling

hopeless or helpless.  Denied feeling depressed.  We will follow up and

advise accordingly.



Thank you very much for letting me participate in the care of your patient.

The patient was willing to provide consent for collateral information from

the family.



Thank you very much.





__________________________________________

Jacklyn Higuera MD



DD:  07/20/2018 15:41:36

DT:  07/20/2018 15:45:40

Job # [id number]

## 2018-07-21 NOTE — PN
DATE:  07/21/2018



FOLLOWUP NOTE



SUBJECTIVE:  The patient was on one-to-one observation since the time of

admission for questionable suicide precaution.  This writer initially

evaluated the patient yesterday.  Please see initial evaluation for more

detailed information.  As per one-to-one report, the patient is talkative,

does not express any thoughts of harming herself or others.  The patient is

compliant with the medications.  Has episodes of crying spells, but which

is expected because the patient's  passed away at the day when the

patient was admitted to the medical floor.  There are no signs of agitation

or aggression.  The patient is compliant with the medication.  As per

nursing report, the patient had some bag of medications and it was sent to

the pharmacy.  The patient is not psychotic.  The patient is not

aggressive.  This writer called to the patient's pharmacy, phone number is

[phone] and it was confirmed that the patient was prescribed oxycodone

30 mg three times a day, last time the patient filled medication on

06/03/2018 and 1-month supply was given to her, alprazolam 2 mg twice a

day, the patient filled that medication at the same day, the patient got

1-month supply.  The patient also was on amitriptyline 25 mg at the

nighttime, prescriber is Dr. Janes Koehler.  As per the patient, this is her

pain management doctor and amitriptyline was prescribed to her for her

chronic pain and insomnia.  The patient reported that she was compliant

with the medication and did not take any increased doses or did not overuse

or abuse medication.  In regards of the notes of Medical team, the patient

was consuming her 's medication in order to kill herself.  The

patient adamantly denied that.  This writer gave a call to the patient's

son, Hill, phone number [phone].  There is no option to leave a

message because mailbox is full.  The patient reported that she has

12-year-old daughter and 2 grown kids and she promised her  that she

will take care of them.  The patient wants to keep her promises.  Vital

signs reviewed.  Pulse is 107, blood pressure 158/97.  Medications

reviewed.  The patient is on atenolol, N-acetylcysteine, Zithromax 500 mg

daily, Robitussin, heparin, Humulin, Atrovent, Claritin, lorazepam

0.5 mg three times a day as needed for anxiety, Solu-Medrol, Singulair,

Percocet, Protonix, sodium chloride.  We will resume amitriptyline at the

nighttime as needed for insomnia.  The patient's vital signs reviewed. 

Medications reviewed.  Labs most recent were from 07/19/2018.  Urine drug

screen was positive for opioids as well as benzodiazepines.  At the same

time, the patient was prescribed oxycodone as well as alprazolam.



MENTAL STATUS EXAMINATION:  The patient appears to be alert and oriented,

pleasant, cooperative, talkative, at times tearful during the interview,

which is expected.  The patient lost her  less than 3 days ago. 

Mood described, I am hanging in there.  Affect was constricted, but

reactive.  Mood congruent.  Thought process was coherent and goal directed.

Thought content, the patient denied visual, auditory or tactile

hallucinations.  Denied paranoid ideation.  The patient denied thoughts of

harming herself or others.  Denied intent or plan.  Insight and judgment

seems to be fair.  Impulses are well controlled.



IMPRESSION:  Rule out adjustment disorder.



PLAN:  We will discontinue one-to-one for now.  This writer attempted to

call to her son, but mailbox is full.  Medications confirmed.  The patient

does not exhibit any aggressive or agitated behavior.  This writer will

follow up and advise accordingly.  Should you have any questions, give me a

call back.





__________________________________________

Jacklyn Higuera MD





DD:  07/21/2018 14:16:37

DT:  07/21/2018 14:20:28

Job # [id number]



[id number]

## 2018-07-21 NOTE — CON
DATE:  07/20/2018



PULMONARY CONSULT



REFERRING PHYSICIAN:  Aries Van MD



REASON FOR CONSULT:  Chronic lung disease.



HISTORY OF PRESENT ILLNESS:  This is a 47 years old female with past

medical history significant for childhood asthma, from the last few days

been having some cough, shortness of breath, and wheezing, came into

emergency room, received IV and inhaled bronchodilator, and was admitted

for further workup.  This morning, has a rapid response called because of

shortness of breath.  She was given inhaled bronchodilator and felt better.

Presently under one-to-one supervision, lying in the bed, feels better, but

still have cough and shortness of breath.  No hemoptysis or emesis.  No

hematuria, no diarrhea reported.



PAST MEDICAL HISTORY:  As per the history of present illness.



FAMILY HISTORY:  No significant cardiopulmonary disease reported.



SOCIAL HISTORY:  Denying smoking or alcohol use.



ALLERGIES:  NONE KNOWN.



MEDICATIONS:  She is on Mucomyst inhaled every 6 hours, lorazepam 0.5 mg

three times a day p.r.n., Atrovent inhaled every 6 hours, heparin 5000

units subcu every 12 hours, insulin coverage, Percocet 5/325 one tablet

every 6 hours p.r.n., Protonix 40 mg daily, Robitussin DM 10 mL every 4

hours p.r.n., IV fluid normal saline 100 mL/hour, Solu-Medrol 60 mg every

12 hours, Tenormin 25 mg daily, Xopenex inhale every 6 hours p.r.n., also

Zithromax 500 mg daily.



REVIEW OF SYSTEMS:  No headache.  Has some rhinitis, cough, clear sputum,

shortness of breath, wheezing.  No nausea, no vomiting, no diarrhea.   No

leg pain or leg swelling.



PHYSICAL EXAMINATION:

GENERAL:  No acute distress.

VITAL SIGNS:  Temperature is 98, heart rate is 99, respiratory rate is 20,

blood pressure 136/80, pulse ox 95% on nasal cannula.

HEENT:  Moist mucous membranes.  Crowded airway.

NECK:  Supple.  No JVD.

LUNGS:  Expiratory wheezing.  Few scattered rhonchi.

HEART:  S1 and S2.

ABDOMEN:  Soft, nontender.  No organomegaly.

EXTREMITIES:  No edema.

NEUROLOGIC:  Awake, alert, and follows simple commands.



LABORATORY DATA:  Shows hemoglobin 11.1, hematocrit 34.7, WBC 17.9, and

platelet count is 411.  Blood gases show pH 7.33, pCO2 of 32, O2 of 215,

this is on 50% oxygen.  Sodium 140, potassium 4.5, chloride 102,

bicarbonate 23.  BUN 21, creatinine 1.  Glucose 128.  Calcium 9.1,

magnesium 2.2.  Total bili 0.6, AST 59, ALT 55, alk phos is 103.  . 

Troponin less than 0.01.  Procalcitonin 0.12.  Drug screen done today is

opiates and benzodiazepine positive.  Chest x-ray done yesterday in ER,

there is no active disease.



IMPRESSION AND PLAN: Exacerbation of chronic obstructive lung disease, rule

out anxiety disorder, adjustment disorder.  Pulmonary point of view, keep

steroids, antibiotics, inhaled bronchodilator.  Gastric and deep venous

thrombosis prophylaxes.  We will recommend PFT as outpatient, need

Psychiatry consult.



Thank you and we will follow with you.







__________________________________________

Yvette Pena MD



DD:  07/20/2018 16:45:08

DT:  07/20/2018 16:48:24

Job # [id number]

## 2018-07-22 RX ADMIN — ACETYLCYSTEINE SCH ML: 200 INHALANT RESPIRATORY (INHALATION) at 14:33

## 2018-07-22 RX ADMIN — ACETYLCYSTEINE SCH ML: 200 INHALANT RESPIRATORY (INHALATION) at 19:50

## 2018-07-22 RX ADMIN — METHYLPREDNISOLONE SODIUM SUCCINATE SCH MG: 40 INJECTION, POWDER, FOR SOLUTION INTRAMUSCULAR; INTRAVENOUS at 10:41

## 2018-07-22 RX ADMIN — INSULIN HUMAN SCH: 100 INJECTION, SOLUTION PARENTERAL at 12:37

## 2018-07-22 RX ADMIN — METHYLPREDNISOLONE SODIUM SUCCINATE SCH MG: 40 INJECTION, POWDER, FOR SOLUTION INTRAMUSCULAR; INTRAVENOUS at 21:28

## 2018-07-22 RX ADMIN — Medication SCH: at 03:31

## 2018-07-22 RX ADMIN — GUAIFENESIN AND DEXTROMETHORPHAN PRN ML: 100; 10 SYRUP ORAL at 14:40

## 2018-07-22 RX ADMIN — INSULIN HUMAN SCH: 100 INJECTION, SOLUTION PARENTERAL at 21:31

## 2018-07-22 RX ADMIN — GUAIFENESIN AND DEXTROMETHORPHAN PRN ML: 100; 10 SYRUP ORAL at 18:05

## 2018-07-22 RX ADMIN — Medication SCH MG: at 07:25

## 2018-07-22 RX ADMIN — OXYCODONE AND ACETAMINOPHEN PRN TAB: 10; 325 TABLET ORAL at 18:03

## 2018-07-22 RX ADMIN — GUAIFENESIN AND DEXTROMETHORPHAN PRN ML: 100; 10 SYRUP ORAL at 08:57

## 2018-07-22 RX ADMIN — PANTOPRAZOLE SODIUM SCH MG: 40 TABLET, DELAYED RELEASE ORAL at 08:57

## 2018-07-22 RX ADMIN — ACETYLCYSTEINE SCH: 200 INHALANT RESPIRATORY (INHALATION) at 07:26

## 2018-07-22 RX ADMIN — ACETYLCYSTEINE SCH ML: 200 INHALANT RESPIRATORY (INHALATION) at 07:25

## 2018-07-22 RX ADMIN — Medication SCH MG: at 11:06

## 2018-07-22 RX ADMIN — GUAIFENESIN AND DEXTROMETHORPHAN PRN ML: 100; 10 SYRUP ORAL at 21:28

## 2018-07-22 RX ADMIN — OXYCODONE HYDROCHLORIDE AND ACETAMINOPHEN PRN TAB: 5; 325 TABLET ORAL at 03:14

## 2018-07-22 RX ADMIN — OXYCODONE HYDROCHLORIDE AND ACETAMINOPHEN PRN TAB: 5; 325 TABLET ORAL at 08:54

## 2018-07-22 RX ADMIN — AZITHROMYCIN DIHYDRATE SCH MLS/HR: 500 INJECTION, POWDER, LYOPHILIZED, FOR SOLUTION INTRAVENOUS at 10:44

## 2018-07-22 RX ADMIN — INSULIN HUMAN SCH: 100 INJECTION, SOLUTION PARENTERAL at 08:31

## 2018-07-22 RX ADMIN — OXYCODONE AND ACETAMINOPHEN PRN TAB: 10; 325 TABLET ORAL at 22:54

## 2018-07-22 RX ADMIN — PANTOPRAZOLE SODIUM SCH: 40 TABLET, DELAYED RELEASE ORAL at 12:38

## 2018-07-22 RX ADMIN — Medication SCH MG: at 14:33

## 2018-07-22 RX ADMIN — Medication SCH MG: at 19:50

## 2018-07-22 RX ADMIN — INSULIN HUMAN SCH: 100 INJECTION, SOLUTION PARENTERAL at 17:44

## 2018-07-22 NOTE — PN
DATE:  07/22/2018



FOLLOWUP NOTE



SUBJECTIVE:  Shortly, the patient is a 47-year-old who was admitted on the

medical site for evaluation of asthma exacerbation.  Psych consult was

called for evaluation of depressive symptoms.  The patient burred her

 at the time of admission and medical.  The patient's family was

concerned about suicidality, which the patient adamantly denied.  This

writer was following on this patient for the past 2 days.  The patient does

not have signs of any suicidal ideations.  The patient is not psychotic,

compliant with the medications.  At times has tearful spells, which is

expected because her  just passed away 3 days ago.  The patient does

not exhibit any aggression or agitation.  The patient's medications were

confirmed with the pharmacy, resumed.  Vital signs seems to be stable, but

blood pressure is elevated 171/97, pulse is 69, temperature 97.6,

respirations 20, oxygen saturation of 98.  Medications reviewed.  The

patient is on Elavil 25 mg at the nighttime as needed for insomnia,

atenolol 25 mg twice a day, Zithromax, Catapres, Robitussin, heparin,

Humulin, Atrovent, _____, Claritin, Ativan as needed, Solu-Medrol,

Singulair, Percocet and Protonix.  Labs were reviewed.  Most recent was

from 07/19/2018.  Urine drug screen was positive for opioids as well as

benzodiazepines.  Benzodiazepines and opioids were prescribed by her

primary care physician.



MENTAL STATUS EXAMINATION:  The patient presented to be alert and oriented,

pleasant, cooperative, fair eye contact.  Speech was normal rate, tone,

quality and quantity, but at times overproductive.  Thought process,

coherent and goal directed.  Thought content, the patient denied visual,

auditory or tactile hallucinations.  Denied paranoid ideation.  Denied

thoughts of harming herself or others.  Denied intents or plan.  Thought

content, no psychosis.  Insight and judgment fair.  Impulses are well

controlled.



IMPRESSION:  Rule out adjustment disorder.  Medical team raised concern

about possible overusing and abusing medication, but the patient adamantly

denied.  Moreover, the patient was prescribed benzodiazepines and pain

killers.



PLAN:  This writer attempted to speak to the to the patient's son, but

mailbox is full.  There is no option to leave a message.  The patient does

not exhibit any aggression or agitation.  Never verbalized thoughts of

killing herself.  Affect is full.  The patient is doing well in regards of

grieving.  This writer will follow up every other day on this patient. 

There is no need of one-to-one.  Please contact family for collaterals. 

Meanwhile, continue current management.  Elavil was confirmed 25 mg at the

nighttime as needed for insomnia.



Thank you very much for letting me participate in the care of your patient.





__________________________________________

Jacklyn Higuera MD







DD:  07/22/2018 11:38:06

DT:  07/22/2018 11:40:54

Job # [id number]

## 2018-07-22 NOTE — PN
DATE:  07/22/2018



PULMONARY PROGRESS NOTE



REFERRING PHYSICIAN:  Aries Van MD



SUBJECTIVE:  The patient is lying in the bed, head at 45 degrees, night was

unremarkable.  Breathing is okay, but still has some cough.  No more sputum

production.  No nausea, vomiting, or diarrhea.  No leg pain, no leg

swelling.



OBJECTIVE:

GENERAL:  In no acute distress.

VITAL SIGNS:  Temperature is 98, heart rate 72, respiratory rate is 20,

blood pressure 171/97, pulse 78, _____ on nasal cannula.

HEENT:  Moist mucous membrane.  Crowded airway.

NECK:  Supple.  No JVD.

LUNGS:  Scattered rhonchi.

HEART:  S1, S2.

ABDOMEN:  Soft, nontender.  No organomegaly.

EXTREMITIES:  There is no edema.

NEUROLOGIC:  Awake, alert, follow simple commands.



MEDICATIONS:  She is on Mucomyst twice a day, the patient refused this

morning.  Ativan 0.5 mg three times a day p.r.n., Atrovent 0.5 mg inhaled

every 6 hours, clonidine 0.1 mg every 6 hours p.r.n., Claritin 10 mg daily,

Elavil 25 mg at bedtime p.r.n., heparin 5000 units subcu every 12 hours,

Percocet 5/325 one tablet every 6 hours p.r.n., Protonix 40 mg daily,

Robitussin DM 10 mL every 4 hours p.r.n., Singulair 10 mg daily,

Solu-Medrol 40 mg every 12 hours, Tenormin 25 mg twice a day, Xopenex

inhaled every 6 hours p.r.n., Zithromax 500 mg daily.



LABORATORY DATA:  Reviewed and noted.



No new changes in medications reported since yesterday.



IMPRESSION AND PLAN:  Exacerbation of chronic obstructive lung disease,

anxiety disorder, adjustment disorder.  Pulmonary point of view, doing

okay.  Continue IV and inhaled bronchodilator.  Keep head at 45 degrees. 

Cough suppressant.  Gastric prophylaxis.  She will follow by Psychiatry.



Thank you and we will follow with you.







__________________________________________

Yvette Pena MD



DD:  07/22/2018 12:32:16

DT:  07/22/2018 12:53:24

Job # [id number]

## 2018-07-23 LAB
ALBUMIN SERPL-MCNC: 4.3 G/DL (ref 3–4.8)
ALBUMIN/GLOB SERPL: 1.3 {RATIO} (ref 1.1–1.8)
ALT SERPL-CCNC: 94 U/L (ref 7–56)
AST SERPL-CCNC: 38 U/L (ref 14–36)
BUN SERPL-MCNC: 17 MG/DL (ref 7–21)
CALCIUM SERPL-MCNC: 9.5 MG/DL (ref 8.4–10.5)
ERYTHROCYTE [DISTWIDTH] IN BLOOD BY AUTOMATED COUNT: 15.6 % (ref 11.5–14.5)
GFR NON-AFRICAN AMERICAN: > 60
HGB BLD-MCNC: 12 G/DL (ref 12–16)
MCH RBC QN AUTO: 28.2 PG (ref 25–35)
MCHC RBC AUTO-ENTMCNC: 32.4 G/DL (ref 31–37)
MCV RBC AUTO: 87.1 FL (ref 80–105)
PLATELET # BLD: 384 10^3/UL (ref 120–450)
PMV BLD AUTO: 8.6 FL (ref 7–11)
RBC # BLD AUTO: 4.25 10^6/UL (ref 3.5–6.1)
WBC # BLD AUTO: 12 10^3/UL (ref 4.5–11)

## 2018-07-23 RX ADMIN — INSULIN HUMAN SCH: 100 INJECTION, SOLUTION PARENTERAL at 18:42

## 2018-07-23 RX ADMIN — OXYCODONE AND ACETAMINOPHEN PRN TAB: 10; 325 TABLET ORAL at 01:45

## 2018-07-23 RX ADMIN — Medication SCH MG: at 11:02

## 2018-07-23 RX ADMIN — Medication SCH MG: at 00:15

## 2018-07-23 RX ADMIN — TRIAMTERENE AND HYDROCHLOROTHIAZIDE SCH CAP: 25; 37.5 CAPSULE ORAL at 10:17

## 2018-07-23 RX ADMIN — Medication SCH: at 03:56

## 2018-07-23 RX ADMIN — INSULIN HUMAN SCH: 100 INJECTION, SOLUTION PARENTERAL at 07:30

## 2018-07-23 RX ADMIN — Medication SCH MG: at 15:39

## 2018-07-23 RX ADMIN — ACETYLCYSTEINE SCH ML: 200 INHALANT RESPIRATORY (INHALATION) at 11:02

## 2018-07-23 RX ADMIN — GUAIFENESIN AND DEXTROMETHORPHAN PRN ML: 100; 10 SYRUP ORAL at 01:44

## 2018-07-23 RX ADMIN — INSULIN HUMAN SCH: 100 INJECTION, SOLUTION PARENTERAL at 12:35

## 2018-07-23 RX ADMIN — ACETYLCYSTEINE SCH ML: 200 INHALANT RESPIRATORY (INHALATION) at 07:18

## 2018-07-23 RX ADMIN — INSULIN HUMAN SCH: 100 INJECTION, SOLUTION PARENTERAL at 22:40

## 2018-07-23 RX ADMIN — ACETYLCYSTEINE SCH: 200 INHALANT RESPIRATORY (INHALATION) at 03:55

## 2018-07-23 RX ADMIN — GUAIFENESIN AND DEXTROMETHORPHAN PRN ML: 100; 10 SYRUP ORAL at 09:08

## 2018-07-23 RX ADMIN — Medication SCH MG: at 19:46

## 2018-07-23 RX ADMIN — AZITHROMYCIN DIHYDRATE SCH MLS/HR: 500 INJECTION, POWDER, LYOPHILIZED, FOR SOLUTION INTRAVENOUS at 10:00

## 2018-07-23 RX ADMIN — Medication SCH MG: at 07:18

## 2018-07-23 RX ADMIN — PANTOPRAZOLE SODIUM SCH MG: 40 TABLET, DELAYED RELEASE ORAL at 10:18

## 2018-07-23 RX ADMIN — ACETYLCYSTEINE SCH ML: 200 INHALANT RESPIRATORY (INHALATION) at 19:46

## 2018-07-23 RX ADMIN — OXYCODONE AND ACETAMINOPHEN PRN TAB: 10; 325 TABLET ORAL at 09:06

## 2018-07-23 RX ADMIN — GUAIFENESIN AND DEXTROMETHORPHAN PRN ML: 100; 10 SYRUP ORAL at 21:05

## 2018-07-23 RX ADMIN — OXYCODONE AND ACETAMINOPHEN PRN TAB: 10; 325 TABLET ORAL at 21:04

## 2018-07-23 NOTE — US
PROCEDURE:  Bilateral renal artery duplex ultrasound.



CLINICAL HISTORY:  Renal artery stenosis. Uncontrolled hypertension. 

Evaluate for renovascular hypertension.



PHYSICIAN(S):  Jermaine Somers M.D.



TECHNIQUE:

Duplex sonography with color-flow Doppler was used to evaluate the 

visualized segments of the main renal arteries. The patient was 

evaluated in a fasting state. Imaging in a supine and decubitus 

position was performed. Limited evaluation of the arcuate waveforms 

and resistive indices were performed.



FINDINGS:

The exam is somewhat limited by body habitus. The kidneys are normal 

in size, shape, and location. The right kidney measures 9.9cm in 

length and the left kidney measures 10.1cm in length. No solid renal 

masses, abnormal calcifications, or hydronephrosis is seen. 



The main right renal artery is well visualized from the aorta to the 

hilum. The peak systolic velocity in the right main renal artery is 

91 cm/sec. This is consistent with a 0 to 49% stenosis in the main 

right renal artery. The arcuate waveforms are normal. The resistive 

index is normal.



The main left renal artery is somewhat tortuous and only visualized 

in segments.. The peak systolic velocity in the main left renal 

artery is 77cm/sec. This corresponds to a 0 to 49% stenosis in the 

main left renal artery. The arcuate waveforms and resistive indices 

are normal.



IMPRESSION:

1. The main renal arteries are fairly well visualized.



2. No sonographically significant stenosis is identified.



3. The kidneys are normal and symmetric in size. There are no solid 

renal masses, abnormal calcifications or hydronephrosis noted.

## 2018-07-23 NOTE — CP.PCM.CON
History of Present Illness





- History of Present Illness


History of Present Illness: 


Nephrology Consultation Note:





Assessment: Stable


uncontrolled severe HTN possibly trigger as asthma exacerbation


mild rhabomyolysis


glycosuria


hypertension (years), chronic back pain, asthma and morbid obesity


adjustment disorder





Plan


Hypertension control with meds as ordered. Patient not on ACEI/ARB hence 

started losartan 50 mg/day. agree with Diazide 


sec HTN work up with renin/ethel, metanephrines, TSH, renal artery doppler. also 

checking vit D level, A1c


CPK level normal now hence d/c IVF


if CPK level high recurrence in future then consider further work up





Dose meds/antibiotics for GFR >60. 


Glycemic control


Recommend weight loss, diet, exercise and lifestyle modifications


Further work up/management as per primary team





Thanks for allowing me to participate in care of your patient. Will follow 

patient with you. Please call if any Qs. had d/w team


Dr Didier Villalobos


Office: [phone] Cell: [phone] Fax: [phone]





Chief Complaint; high BP


reason for consult: HTN and rhabdo management


HPI: Pt is a 47 F with hx of hypertension (years), chronic back pain, asthma 

and morbid obesity presented with complaints of SOB and is being managed for 

asthma exacerbation. pt also seen by psychiatry for evaluation of depression. 

pt feels better at this time. SOB is much better. has cough which is mild. 


Denies OTC/herbal meds or NSAIDs


No recent iodinated contrast exposure. 


denies tobacco/smoking/etoh/drugs. 





ROS: 


Cardiovascular: No chest pain. 


Pulmonary: No shortness of breath now


Gastrointestinal: denies abdominal pain No nausea. No vomiting. 


Genitourinary: No pain while urinating. Denies blood in urine. 


All other negative except as mentioned in HPI





Physical Examination:      


General Appearance: Comfortable, in no acute respiratory distress, co-operative 

. obese


Vitals reviewed and noted as below


Head; Atraumatic, normocephalic


ENT: no ulcers no thrush. Tongue is midline. Oropharynx: no rash or ulcers.


EYES: Pupils are equal, round and reactive to light accommodation. Eye muscles 

and extraocular movement intact. Sclera is anicteric.


Neck; supple no lymphadenopathy, no thyromegaly or bruit


Lungs: Normal respiratory rate/effort. Breath sounds bilateral equal and clear


Heart: Normal rate. s1s2 normal. No rub or gallop. 


Extremities: no edema. No varicose veins


Neurological: Patient is alert, awake and oriented to person, place and time. 

No focal deficit. Strength bilateral appropriate and equal


Skin: Warm and dry. Normal turgor. No rash. Palpitation: Normal elasticity for 

age


Abdomen: Abdomen is soft. Bowel sounds +. There is no abdominal tenderness, no 

guarding/rigidity no organomegaly


Psych: normal insight and normal affect/mood


MSK: no joint tenderness or swelling. Digits and nails normal, no deformity


: kidney or bladder not palpable





Labs/imaging reviewed.


Past medical history, past surgical history, family history, social history, 

allergy reviewed and noted as below


Family hx: no hx of CKD. Rest non-contributory 





work up: UA no protein





Past Patient History





- Infectious Disease


Hx of Infectious Diseases: None





- Tetanus Immunizations


Tetanus Immunization: Unknown





- Past Social History


Smoking Status: Never Smoked





- CARDIAC


Hx Hypertension: Yes





- PULMONARY


Hx Asthma: Yes





- NEUROLOGICAL


Hx Neurological Disorder: No





- HEENT


Hx HEENT Problems: No





- RENAL


Hx Chronic Kidney Disease: No





- ENDOCRINE/METABOLIC


Hx Endocrine Disorders: No





- HEMATOLOGICAL/ONCOLOGICAL


Hx Blood Disorders: No





- INTEGUMENTARY


Hx Dermatological Problems: No





- MUSCULOSKELETAL/RHEUMATOLOGICAL


Other/Comment: R leg problem





- GASTROINTESTINAL


Hx Gastrointestinal Disorders: Yes (obese)





- GENITOURINARY/GYNECOLOGICAL


Hx Genitourinary Disorders: No





- PSYCHIATRIC


Hx Depression: No


Hx Emotional Abuse: No


Hx Physical Abuse: No


Hx Substance Use: No





- SURGICAL HISTORY


Hx Surgeries: No





Meds


Allergies/Adverse Reactions: 


 Allergies











Allergy/AdvReac Type Severity Reaction Status Date / Time


 


No Known Allergies Allergy   Verified 04/14/13 05:37














- Medications


Medications: 


 Current Medications





Acetylcysteine (Acetylcysteine 20%)  4 ml IH [id number] [facility]


   Last Admin: 07/23/18 11:02 Dose:  4 ml


Amitriptyline HCl (Elavil)  25 mg PO HS PRN


   PRN Reason: Insomnia


   Last Admin: 07/22/18 23:00 Dose:  25 mg


Atenolol (Tenormin)  25 mg PO BID [facility]


   Last Admin: 07/23/18 10:18 Dose:  25 mg


Clonidine HCl (Catapres)  0.1 mg PO Q4 PRN


   PRN Reason: Systolic Blood Pressure


Clonidine HCl (Catapres)  0.1 mg PO BID [facility]


   Last Admin: 07/23/18 10:16 Dose:  0.1 mg


Guaifenesin/Dextromethorphan (Robitussin Dm)  10 ml PO Q4H PRN


   PRN Reason: Cough


   Last Admin: 07/23/18 09:08 Dose:  10 ml


Heparin Sodium (Porcine) (Heparin)  5,000 units SC Q12 JEF


   PRN Reason: Protocol


   Last Admin: 07/23/18 10:17 Dose:  5,000 units


Hydralazine HCl (Apresoline)  10 mg IVP Q6 PRN


   PRN Reason:  or greater 


Insulin Human Regular (Humulin R Low)  0 units SC ACHS JEF


   PRN Reason: Protocol


   Last Admin: 07/22/18 21:31 Dose:  Not Given


Ipratropium Bromide (Atrovent)  0.5 mg IH [id number] PRN


   PRN Reason: Shortness of Breath


Levalbuterol HCl (Xopenex)  1.25 mg IH [id number] [facility]


   Last Admin: 07/23/18 11:02 Dose:  1.25 mg


Levalbuterol HCl (Xopenex)  1.25 mg IH [id number] PRN


   PRN Reason: Shortness of Breath


   Last Admin: 07/20/18 01:07 Dose:  1.25 mg


Loratadine (Claritin)  10 mg PO DAILY [facility]


   Last Admin: 07/23/18 10:17 Dose:  10 mg


Lorazepam (Ativan)  0.5 mg PO TID PRN; Protocol


   PRN Reason: Anxiety/AGITATION


   Last Admin: 07/22/18 18:02 Dose:  0.5 mg


Losartan Potassium (Cozaar)  50 mg PO DAILY [facility]


   Last Admin: 07/23/18 11:07 Dose:  50 mg


Methylprednisolone (Solu-Medrol)  40 mg IVP Q12 [facility]


   Last Admin: 07/22/18 21:28 Dose:  40 mg


Methylprednisolone (Solu-Medrol)  30 mg IV Q12H [facility]


   Last Admin: 07/23/18 09:07 Dose:  30 mg


Montelukast Sodium (Singulair)  10 mg PO HS [facility]


   Last Admin: 07/22/18 21:29 Dose:  10 mg


Oxycodone/Acetaminophen (Percocet 10/325 Mg Tab)  1 tab PO Q4H PRN


   PRN Reason: Pain, severe (8-10)


   Last Admin: 07/23/18 09:06 Dose:  1 tab


Pantoprazole Sodium (Protonix Ec Tab)  40 mg PO ACB JEF


   Last Admin: 07/23/18 10:18 Dose:  40 mg


Triamterene/HCTZ (Dyazide 25 Mg-37.5 Mg)  1 cap PO DAILY [facility]


   Last Admin: 07/23/18 10:17 Dose:  1 cap











Results





- Vital Signs


Recent Vital Signs: 


 Last Vital Signs











Temp  97.9 F   07/23/18 08:51


 


Pulse  68   07/23/18 10:16


 


Resp  18   07/23/18 08:51


 


BP  158/86 H  07/23/18 10:16


 


Pulse Ox  98   07/23/18 08:51














- Labs


Result Diagrams: 


 07/23/18 07:50





 07/23/18 07:50


Labs: 


 Laboratory Results - last 24 hr











  07/23/18





  11:08


 


POC Glucose (mg/dL)  80

## 2018-07-23 NOTE — PN
DATE:  07/23/2018



SUBJECTIVE:  The patient was followed up today.  The patient presented with

reactive affect.  The patient presented very well today.  The patient

reported that she feels much better.  Her breathing is improving.  The

patient reported that her mood is the same.  She feels hopeful for the

future dealing with the loss of her  well.  The patient does not

exhibit any agitation or aggression.  The patient has fair appetite and

sleep.  The patient tolerated medications well.  No side effects observed

or reported.  As per nursing report, the patient never verbalized any

thoughts of harming herself or others, compliant with the medications, not

in any acute distress.



VITAL SIGNS:  Reviewed.  Temperature 97.9, pulse 68, blood pressure 158/86,

respirations 18, oxygen saturation is 98.



MEDICATIONS:  Reviewed.  She is on acetylcysteine, Elavil was resumed 25 mg

at the nighttime, atenolol, Catapres, Robitussin, heparin, hydralazine,

Humulin, Atrovent, levalbuterol _____, Claritin, Ativan as needed, Cozaar,

Solu-Medrol, Percocet, and _____.



LABORATORY DATA:  Reviewed.  Most recent was from today.  WBC cells 12.



MENTAL STATUS EXAMINATION:  The patient presented to be alert and oriented,

pleasant, cooperative.  Mood described, "I feel better."  Affect was

reactive, mood congruent.  Thought process was coherent and goal directed. 

Thought content, the patient denied visual, auditory, tactile

hallucinations.  Denied paranoid ideation.  The patient denied thoughts of

harming herself or others.  Denied intent or plan.  Insight and judgment

seems to be improving.  Impulses well controlled.



IMPRESSION:  Rule out adjustment disorder.  Normal grief.



PLAN:  Continue current management.  Continue current medication.  There

are no signs of aggression or agitation.  The patient never verbalized

thoughts of killing herself or others.  The patient is taking her

medication.  Denied any side effects.  Information about outpatient program

provided to the patient.  The patient pose no imminent danger to self or

others.  Can be followed up with her outpatient primary care physician,

pain management as well as information about outpatient clinic provided. 

This writer will sign off.  Should you have any questions give me a call

back.



Thank you very much for letting me participate in care of your patient.





__________________________________________

Jacklyn Higuera MD



DD:  07/23/2018 15:43:25

DT:  07/23/2018 15:45:25

[facility] # [id number]

## 2018-07-23 NOTE — PN
DATE:  07/23/2018



PULMONARY PROGRESS NOTE



REFERRING PHYSICIAN:  Aries Van MD



SUBJECTIVE:  The patient is ambulating in the room.  Night was

unremarkable.  Feels better.  Decreased cough.  Decreased shortness of

breath.  No nausea.  No vomiting or diarrhea.  No leg pain or leg swelling.



OBJECTIVE:

GENERAL:  In no acute distress.

VITAL SIGNS:  Temperature is 99, heart rate 74, respiratory rate is 20,

blood pressure 158/97, pulse ox 95% on nasal cannula.

HEENT:  Moist mucous membrane.  No ulcer or oral thrush noted.

NECK:  Supple.  No JVD.

LUNGS:  Have a fair airflow with few rhonchi.

HEART:  S1 and S2.

ABDOMEN:  Soft, nontender.  No organomegaly.

EXTREMITIES:  No edema.

NEUROLOGICAL:  Awake and alert.  Follows simple command.



MEDICATIONS:  She is on Mucomyst inhaled every 6 hours, hydralazine 10 mg

every 6 hours p.r.n., lorazepam 0.5 mg three times a day p.r.n., Atrovent

inhaled every 6 hours p.r.n., clonidine 0.1 mg every 4 hours p.r.n.,

Catapres 0.1 mg twice a day, Claritin 10 mg daily, Cozaar 50 mg daily,

Dyazide 25/37.5 one tablet daily, Elavil 25 mg at bedtime, heparin 5000

units subcu every 12 hours, insulin coverage, Percocet 10/325 every 4 hours

p.r.n., Protonix 40 mg daily, Robitussin DM 10 mL every 4 hours p.r.n.,

Singulair 10 mg daily, Solu-Medrol 30 mg every 12 hours, Tenormin 25 mg

twice a day, Xopenex inhaled every 6 hours p.r.n.



LABORATORY DATA:  Shows hemoglobin 12, hematocrit 37, WBC 12, platelet is

384.  Sodium 141, potassium 3.8, chloride 99, bicarbonate 28, BUN 17,

creatinine 0.6, glucose 105, calcium 9.5, AST 38, ALT 94, alkaline

phosphatase is 93, creatine kinase 60, albumin is 4.3.  Beta-hCG

quantitative less than 2.39.  Has a renal artery duplex done, which shows

the main renal arteries are fairly well visualized.  No sonographic

evidence of stenosis is identified.



IMPRESSION AND PLAN:  Chronic obstructive lung disease, adjustment

disorder, anxiety disorder, may have sleep apnea syndrome.  Pulmonary point

of view, doing okay.  We will discontinue Solu-Medrol, Be-Tab prednisone 20

mg daily and taper off.  Sleep apnea precaution.  Fall precaution. 

Recommend sleep study and PFT as outpatient.



Thank you and we will follow with you.





__________________________________________

Yvette Pena MD



DD:  07/23/2018 19:19:17

DT:  07/23/2018 19:21:12

[facility] # [id number]

## 2018-07-24 VITALS — RESPIRATION RATE: 20 BRPM | OXYGEN SATURATION: 94 % | TEMPERATURE: 98 F

## 2018-07-24 VITALS — HEART RATE: 82 BPM | SYSTOLIC BLOOD PRESSURE: 160 MMHG | DIASTOLIC BLOOD PRESSURE: 90 MMHG

## 2018-07-24 LAB
HDLC SERPL-MCNC: 44 MG/DL (ref 29–60)
LDLC SERPL-MCNC: 100 MG/DL (ref 0–129)

## 2018-07-24 RX ADMIN — Medication SCH MG: at 08:02

## 2018-07-24 RX ADMIN — ACETYLCYSTEINE SCH: 200 INHALANT RESPIRATORY (INHALATION) at 16:26

## 2018-07-24 RX ADMIN — TRIAMTERENE AND HYDROCHLOROTHIAZIDE SCH CAP: 25; 37.5 CAPSULE ORAL at 14:38

## 2018-07-24 RX ADMIN — Medication SCH MG: at 16:32

## 2018-07-24 RX ADMIN — Medication SCH: at 06:38

## 2018-07-24 RX ADMIN — PANTOPRAZOLE SODIUM SCH MG: 40 TABLET, DELAYED RELEASE ORAL at 14:39

## 2018-07-24 RX ADMIN — INSULIN HUMAN SCH: 100 INJECTION, SOLUTION PARENTERAL at 08:48

## 2018-07-24 RX ADMIN — ACETYLCYSTEINE SCH ML: 200 INHALANT RESPIRATORY (INHALATION) at 08:02

## 2018-07-24 RX ADMIN — Medication SCH: at 01:21

## 2018-07-24 RX ADMIN — Medication SCH: at 11:30

## 2018-07-24 RX ADMIN — ACETYLCYSTEINE SCH: 200 INHALANT RESPIRATORY (INHALATION) at 06:38

## 2018-07-24 NOTE — PN
DATE:  07/24/2018



PULMONARY PROGRESS NOTE



REFERRING PHYSICIAN:  Aries Van MD



SUBJECTIVE:  Night was unremarkable.  No new pulmonary complaints.  Still

has a mild cough.  No sputum production.  No nausea, vomiting or diarrhea. 

No leg pain or leg swelling.



PHYSICAL EXAMINATION:

GENERAL:  In no acute distress.

VITAL SIGNS:  Temperature is 98, heart rate 60, respiratory rate is 20,

blood pressure 163/90, pulse ox 94% on room air.

HEENT:  Moist mucous membrane.  Crowded airway.

NECK:  Supple.  No JVD.

LUNGS:  Have a fair airflow with prolonged expiratory phase.

HEART:  S1 and S2.

ABDOMEN:  Soft, nontender.  No organomegaly.

EXTREMITIES:  No edema.

NEUROLOGICAL:  Awake and alert.  Follows simple command.



MEDICATIONS:  She is on Mucomyst inhaled every 6 hours, hydralazine 10 mg

every 6 hours p.r.n., lorazepam 0.5 mg three times a day p.r.n., Atrovent

inhaled every 6 hours p.r.n., Catapres 0.1 mg twice a day, Claritin 10 mg

daily, Cozaar 100 mg daily, Dyazide 25/37.5 one capsule daily, Elavil 25 mg

at bedtime p.r.n., heparin 5000 units subcu every 12 hours, Percocet 10/325

one tab every 4 hours p.r.n., prednisone 20 mg daily, Protonix 40 mg daily,

guaifenesin p.r.n. basis, Singulair 10 mg daily, Tenormin 25 mg twice a

day, Xopenex inhaled also p.r.n. basis.



LABORATORY DATA:  Shows blood sugar this morning 78, cholesterol is 180,

triglycerides 218.



IMPRESSION AND PLAN:  Chronic obstructive lung disease, adjustment

disorder, anxiety disorder, may have sleep apnea.  Cardiac workup is in

progress.  Pulmonary point of view, doing well.  Taper off prednisone next

couple of days.  Continue inhaled bronchodilator, antihistamine, nasal

steroids.  Will benefit outpatient sleep study and pulmonary function test.

Fall precaution.



Thank you and we will follow with you.







__________________________________________

Yvette Pena MD



DD:  07/24/2018 12:19:14

DT:  07/24/2018 14:07:30

[facility] # [id number]

## 2018-07-24 NOTE — CON
DATE:  2018



SERVICE:  CARDIOLOGY



REASON FOR THE CONSULTATION:  Cardiac evaluation, abnormal EKG flipping of

T-wave, chest pain, admitted with asthma.



BRIEF CLINICAL HISTORY:  This is a 47-year-old female with past medical

history significant for asthma since childhood, improved and recently did

not have any asthma attacks, but recently the patient was taking the

grandson to the park and was stressed out because of the 's cancer,

he .  So, because of lot of social issues, the patient started feeling

tightness in chest, shortness of breath and asthma, and was admitted here. 

Denies any prior episodes of dyspnea on exertion or chest pain on exertion.



PAST MEDICAL HISTORY:  Significant for hypertension for more than 13 to 15

years, was on atenolol at one time.  Recently, the patient started taking

lot of analgesics from the 's, including oxycodone for the knee

pain.  On admission, history of hypertension and asthma.



FAMILY HISTORY:  Significant for coronary artery disease in grandfather

from the mother's side.  Mother has hypertension _____ history of coronary

artery disease.  Family history of breast cancer.



CURRENT MEDICATIONS:  The patient at home was taking atenolol, tramadol and

potassium pills.



SOCIAL HISTORY:  Denies any smoking.  Denies any history of alcohol abuse.



No recent cardiac workup is done.



REVIEW OF SYSTEMS:  As per HPI.



PHYSICAL EXAMINATION:

VITAL SIGNS:  Height of the patient is 5 feet 2 inches, weight of the

patient 230 pounds, body mass index 43 kg/m2.  Rest of the vitals shows

temperature afebrile, heart rate 60, blood pressure 150/86.

HEENT:  PERRLA.  Extraocular muscles are intact.

NECK:  Supple.  No carotid bruit.  No thyromegaly.

CHEST:  Clear to auscultation.

HEART:  S1 and S2 regular.

ABDOMEN:  Soft.

EXTREMITIES:  Clubbing and cyanosis negative.



LABORATORY DATA:  Blood workup as follows; WBC 12, hemoglobin 12,

hematocrit 37, platelet count 384.  Chemistry shows sodium 141, potassium

3.8, chloride 99, carbon dioxide 28, anion gap of 18, BUN 17, creatinine

0.6.  Total protein 7.5, albumin 4.3, albumin globulin ratio 1.3.  EKG

shows normal sinus rhythm, no acute ST-T changed noted, but telemetry strip

shows II, III, aVF T wave flipping in the telemetry and was an upright in

EKG but now is T wave inversion noted.



IMPRESSION:  A 47-year-old female with past medical history significant for

obesity, increased body mass index, hypertension for many years, admitted

with acute exacerbation of COPD, taking a lot of analgesics, narcotic

analgesics, from the  and complaining of the knee pain.  Admitted

with some chest tightness secondary to wheezing, but flipping of T wave so

far, does not look like any acute myocardial infarction, but given the

multiple risk factors for coronary artery disease, suggest echo and a

stress test.  She has a troponin x2 negative, but given the risk factors

including diabetes, hypertension for many years, obesity and abnormal EKG

flipping the T wave in telemetry suggest echo and a stress test, lipid

profile, TSH and hemoglobin A1c.  Further recommendations after the stress

test.  We will follow with you.



We will keep n.p.o. after _____ get echo and a stress test.



Thank you Dr. Van for providing us the opportunity in taking of the

patient, Tammy Wiley.





__________________________________________

Yvette Grover MD





DD:  2018 16:03:05

DT:  2018 19:22:00

Job # [id number]

## 2018-07-24 NOTE — PN
DATE:  07/23/2018



SUBJECTIVE:  The patient sitting in the bed, comfortable, in no respiratory

distress.  Blood pressure is down and she feels comfortable, off IV fluid

right now, and laboratory checked was okay.



PHYSICAL EXAMINATION:

VITAL SIGNS:  Afebrile.  Temperature is 97.9, heart rate 68, blood pressure

158/86, respirations 18, saturation 98% on 4 liters.

HEAD AND NECK:  Normal.  No JVD.  No thyromegaly.

CHEST:  Clear bilaterally.  No wheezing.

CARDIAC:  First sound and second sound normal.

ABDOMEN:  Soft, obese, nontender.

EXTREMITIES:  No edema.

NEUROLOGICAL:  Normal.



LABORATORY DATA:  Her laboratory studies show white count 12, which is

down; hemoglobin 12; hematocrit 37; platelets 384.  Chemistry shows blood

sugar in the 90, here.  Sodium 141, potassium 3.8, chloride 99, bicarbonate

28, BUN 17, creatinine 0.6.  Liver function tests are slightly elevated. 

AST 38, ALT 94 and total bilirubin 0.8.  Blood glucose 168, calcium 9.5. 

Albumin, globulin, and alkaline phosphatase is normal.



IMPRESSION AND PLAN:

1.  Acute asthma exacerbations.  Continue tapering down steroids; probably

the patient is doing very well, we may need to switch to p.o. and discharge

her.  Her blood pressure is better.  Dr. Didier Villalobos will see her. 

According to him, it is possibly asthma related.  The patient is doing well

with her current regimen, possible part of it was withdrawal from narcotics

with hyperadrenergic state causing tachycardia and high blood pressures, so

continue clonidine which help blood pressure and withdrawal twice a day, in

addition p.r.n. every four hours.

2.  Chronic pain due to osteoarthritis.  According to her, she has seen

Pain Management as outpatient and was given here oxycodone higher dose. 

She seems okay with the current dose.  Continue oxycodone as prescribed

here 10 mg, and we will continue to monitor her condition.  She should

follow up with her Pain Management doctor.

3.  Abnormal liver function tests, probably medication related, should be

monitored as outpatient.  I did discuss the lab with the patient in detail

including liver function abnormalities.

4.  Osteoarthritis, chronic osteoarthritis of her back and knee and

shoulder and follow up with the rheumatologist.  The patient also has some

chronic anxiety, possible depression, continue Elavil 25 mg nightly,

continue Ativan 0.5 mg t.i.d. p.r.n.



PLAN:  Continue current therapy.  We are starting the patient on p.o.

medications.  Hopefully, we will taper her down at home and will be

discharged in the morning.



CURRENT MEDICATIONS:  Mucomyst, hydralazine 10 mg IV every six hours

p.r.n., Ativan 0.5 t.i.d., Atrovent, Catapres _____ every six hours p.r.n.,

Claritin, Cozaar 50 mg p.o. daily, Dyazide one capsule daily, heparin subcu

for DVT prophylaxis, insulin coverage due to steroids, Percocet 10 every

four hours p.r.n., Protonix 40, Robitussin DM, Singulair, Tenormin 25

b.i.d., Xopenex every four hours scheduled and every six hours p.r.n.



Continue current therapy.  Follow up clinically.







__________________________________________

Aries Van MD



DD:  07/24/2018 8:18:18

DT:  07/24/2018 10:14:56

[facility] # [id number]

## 2018-07-24 NOTE — CARD
--------------- APPROVED REPORT --------------





Date of service: 07/24/2018



EXAM: Two-dimensional and M-mode echocardiogram with Doppler and 

color Doppler.



INDICATION





2D DIMENSIONS 

IVSd1.0   (0.7-1.1cm)LVDd5.3   (3.9-5.9cm)

PWd1.1   (0.7-1.1cm)LVDs3.6   (2.5-4.0cm)

FS (%) 31.3   %LVEF (%)58.8   (>50%)



M-Mode DIMENSIONS 

Left Atrium (MM)4.60   (2.5-4.0cm)Aortic Root2.80   (2.2-3.7cm)

Aortic Cusp Exc.2.00   (1.5-2.0cm)



Aortic Valve

AoV Peak Nslxfvxn005.0cm/Kasandra Peak GR.8mmHg



Mitral Valve

MV E Bckehxfu58.8cm/sMV A Nrukncue522.0cm/sE/A ratio0.9



TDI

Lateral E' Peak V6.43cm/sMedial E' Peak V4.87cm/sE/Lateral E'14.3

E/Medial E'18.9



Tricuspid Valve

TR Peak Cucjnykw521ms/sRAP [id number] Peak Gr.23mmHg

[id number]



 LEFT VENTRICLE 

The left ventricle is normal size. There is normal left ventricular 

wall thickness. The left ventricular function is normal.EF-55-60% 

There is normal LV segmental wall motion. The left ventricular 

diastolic function is normal. No left ventricle thrombus noted on 

this study. There is no ventricular septal defect visualized. There 

is no left ventricular aneurysm. There is no mass noted in the left 

ventricle.



 RIGHT VENTRICLE 

The right ventricle is normal size. There is normal right ventricular 

wall thickness. The right ventricular systolic function is normal.



 ATRIA 

The left atrium is mildly dilated. The right atrium size is normal. 

The interatrial septum is intact with no evidence for an atrial 

septal defect.



 AORTIC VALVE 

The aortic valve is thickened but opens well. No aortic regurgitation 

is present. There is no aortic valvular stenosis. There is no aortic 

valvular vegetation.



 MITRAL VALVE 

The mitral valve is thickened but opens well. Mitral regurgitation is 

trace. There is no mitral valve stenosis. There is no evidence of 

mitral valve prolapse.



 TRICUSPID VALVE 

The tricuspid valve leaflets are thickened , but open well. There is 

trace to mild tricuspid regurgitation.RVSP-33 mmof Hg. There is no 

tricuspid valve stenosis. There is no tricuspid valve prolapse or 

vegetation.



 PULMONIC VALVE 

The pulmonic valve is borderline thickened. There is trace pulmonic 

valvular regurgitation. There is no pulmonic valvular stenosis.



 GREAT VESSELS 

The aortic root is normal in size. The ascending aorta is normal in 

size. The pulmonary artery is normal. The IVC is normal in size and 

collapses >50% with inspiration.



 PERICARDIAL EFFUSION 

There is no pleural effusion. There is no pericardial effusion.



<Conclusion>

The left ventricle is normal size.

There is normal left ventricular wall thickness.

The left ventricular function is normal.EF-55-60%

Mitral regurgitation is trace.

There is trace to mild tricuspid regurgitation.RVSP-33 mmof Hg.

There is trace pulmonic valvular regurgitation.

The IVC is normal in size and collapses >50% with inspiration.

There is no pericardial effusion.

## 2018-07-24 NOTE — CARD
--------------- APPROVED REPORT --------------





Date of service: 07/24/2018



Protocol: LEXISCAN

Test Type: Lexiscan Sestamibi Stress Test

Attending Physician: Dr. Yvette Grover

Referring Physician: Dr. Aries Van  

Test Indications: Chest Pain

Height:5 ft  2  in

Weight:235lbs 

Medications: Claritin,Ativan,Cozaar, Singulair, Percocet, Protonix,

Prednisone, Heparin,Elavil, Diazide,Muconyst

Medical History: 46 y/o female. Hx of chest pain,shortness of 

breath, asthma,hypertension,anxiety, family hx of heart disease.

Target HR: 172 bpm

Resting ECG: NSR Poor RR progression

Resting Heart Rate: 76 bpm

Resting Blood Pressure: 162/94mmHg

Submaximum (85%): 146 bpm



PROCEDURE

Pharmacologic stress testing was performed using 0.4mg per 5ml of 

regadenoson given intravenously over 7-10 seconds.

  Reversal agent aminophyline 50 mg, given intravenously for Other.



POST EXERCISE

Reason for Termination: Protocol completed

Target HR: No

Max HR: 76 bpm

51% of Maximum Predicted HR: 172 bpm

Exercise duration: 00:33 min:sec, 0 Stage

Exercise capacity: 1.0METs

Max Blood Pressure: 162/94mmHg

Blood Pressure response to exercise: normal resting BP - appropriate 

response

Heart Rate response to exercise: appropriate

Chest Pain: No, none

Angina index: 0

Arrhythmia: No, none

ST Change: No, none

Deviation: 0 mm



INTERPRETATION

Stress EKG Conclusion: Negative IV Lexiscan for ischemia and for 

chest pain, Nuclear scan to follow.



Signed by  Yvette Grover

Electronically Approved: 07/24/2018 11:50:38



EXAM: Myocardial Perfusion REST/STRESS

Stress Test Type: Pharmacologic



Imaging Protocol

Rest Spect myocardial perfusion imaging was performed in supine 

position 45 minutes following the injection of 10.3 mCi of Tc-99 

Myoview.

At peak stress, the patient was injected intravenously with 30.8mCi 

of Tc-99 tetrofosmin after an infusion time of 0 minutes and 10 

seconds.

Gated StressStress Spect was performed 65 minutes after intravenous 

Tc-99 Myoview injection.

The images were gated to evaluate regional wall motion and calculate 

ventricular ejection fraction.Images were reconstructed using 

backfilter projection method in short horizontal and verticle long 

axis. Spect slices were generated.



LV Perfusion

The quality of the study is somewhat suboptimal due to significant 

breast attenuation on stress and rest studies. The left ventricle is 

normal in size. The right ventricle is unremarkable. The lung uptake 

is normal. The distribution of tracer reveals  mildly decreased 

perfusion involving mid to distal  anteroseptal wall  on the stress 

study. The remainder of the LV myocardium is unremarkable.  The rest 

myocardial perfusion study shows slight improvemento the defect.



Wall Motion

Gated wall motion study shows good contractility of the left 

ventricle.  LVEF = 70%.



Conclusion

1. Probably normal SPECT myocardial perfusion study.

2. Partially reversible, anteroseptal defect is  most likely due to 

shifting breast artifact rather than ischemia.

3. Normal gated wall motion of the left ventricle.

## 2018-07-24 NOTE — CP.PCM.PN
Subjective





- Date & Time of Evaluation


Date of Evaluation: 18


Time of Evaluation: 06:05





- Subjective


Subjective: 








Awake, alert, in the bathroom,denies shortness of breath





Reason for consultation and follow up: Cardiac evaluation for chest pain who 

came in to the ER due to shortness of breath. history of asthma





Seen and examined by me and Dr. Grover














Objective





- Vital Signs/Intake and Output


Vital Signs (last 24 hours): 


 











Temp Pulse Resp BP Pulse Ox


 


 99.0 F   65   19   158/97 H  95 


 


 18 18:00  18 05:59  18 18:00  18 18:00  18 18:00








Intake and Output: 


 











 18





 06:59 18:59


 


Intake Total 360 


 


Balance 360 














- Medications


Medications: 


 Current Medications





Acetylcysteine (Acetylcysteine 20%)  4 ml IH [id number] [facility]


   Last Admin: 18 06:38 Dose:  Not Given


Amitriptyline HCl (Elavil)  25 mg PO HS PRN


   PRN Reason: Insomnia


   Last Admin: 18 23:15 Dose:  25 mg


Atenolol (Tenormin)  25 mg PO BID [facility]


   Last Admin: 18 17:29 Dose:  25 mg


Clonidine HCl (Catapres)  0.1 mg PO Q4 PRN


   PRN Reason: Systolic Blood Pressure


Clonidine HCl (Catapres)  0.1 mg PO BID [facility]


   Last Admin: 18 17:28 Dose:  0.1 mg


Guaifenesin/Dextromethorphan (Robitussin Dm)  10 ml PO Q4H PRN


   PRN Reason: Cough


   Last Admin: 18 21:05 Dose:  10 ml


Heparin Sodium (Porcine) (Heparin)  5,000 units SC Q12 JEF


   PRN Reason: Protocol


   Last Admin: 18 21:01 Dose:  5,000 units


Hydralazine HCl (Apresoline)  10 mg IVP Q6 PRN


   PRN Reason:  or greater 


Insulin Human Regular (Humulin R Low)  0 units SC ACHS JEF


   PRN Reason: Protocol


   Last Admin: 18 22:40 Dose:  Not Given


Ipratropium Bromide (Atrovent)  0.5 mg IH [id number] PRN


   PRN Reason: Shortness of Breath


Levalbuterol HCl (Xopenex)  1.25 mg IH [id number] [facility]


   Last Admin: 18 06:38 Dose:  Not Given


Levalbuterol HCl (Xopenex)  1.25 mg IH [id number] PRN


   PRN Reason: Shortness of Breath


   Last Admin: 18 01:07 Dose:  1.25 mg


Loratadine (Claritin)  10 mg PO DAILY [facility]


   Last Admin: 18 10:17 Dose:  10 mg


Lorazepam (Ativan)  0.5 mg PO TID PRN; Protocol


   PRN Reason: Anxiety/AGITATION


   Last Admin: 18 18:02 Dose:  0.5 mg


Losartan Potassium (Cozaar)  50 mg PO DAILY [facility]


   Last Admin: 18 11:07 Dose:  50 mg


Montelukast Sodium (Singulair)  10 mg PO HS [facility]


   Last Admin: 18 21:01 Dose:  10 mg


Oxycodone/Acetaminophen (Percocet 10/325 Mg Tab)  1 tab PO Q4H PRN


   PRN Reason: Pain, severe (8-10)


   Last Admin: 18 21:04 Dose:  1 tab


Pantoprazole Sodium (Protonix Ec Tab)  40 mg PO ACB [facility]


   Last Admin: 18 10:18 Dose:  40 mg


Prednisone (Prednisone Tab)  20 mg PO DAILY [facility]


Triamterene/HCTZ (Dyazide 25 Mg-37.5 Mg)  1 cap PO DAILY [facility]


   Last Admin: 18 10:17 Dose:  1 cap











- Constitutional


Appears: No Acute Distress





- Eye Exam


Eye Exam: Normal appearance





- ENT Exam


ENT Exam: Mucous Membranes Moist





- Respiratory Exam


Respiratory Exam: Decreased Breath Sounds, Rhonchi, NORMAL BREATHING PATTERN





- Cardiovascular Exam


Cardiovascular Exam: REGULAR RHYTHM, +S1, +S2


Additional comments: 





telemetry NSR 70's





-  Exam


Additional comments: 





continent





- Extremities Exam


Extremities Exam: Normal Capillary Refill





- Neurological Exam


Neurological Exam: Alert, Awake, Oriented x3





- Psychiatric Exam


Psychiatric exam: Normal Affect





- Skin


Skin Exam: Dry, Warm





Assessment and Plan





- Assessment and Plan (Free Text)


Assessment: 





A 47 year old female who came in to the ER due to shortness of breath and chest 

thightness.  History of asthma and hypertension.  recently  due to 

cancer. Post RRT yesterday due to worsening shortness of breath. no previous 

cardiac work up. 


Plan: 





Denies chest pain or shortness of breath


For ECHO and Stress test today


Control blood pressure


Pulmonary on consult


Nephrology on consult


On Tenormin 25 mg BID, Catapres 0.1 mg BID,Cozaar 50 mg daily,


 Triamterene/HCTZ 1 tab daily, PRN Clonidine and Hydralazine for elevated BP


Lifestyle modification


Continue current treatment


Continue current medications





Will follow up





Plan and treatment discusses with Dr. Grover

## 2018-07-25 NOTE — DS
HISTORY OF PRESENT ILLNESS:  A 47-year-old female came in with acute asthma

exacerbations.  The patient was given IV steroids, pulmonary consult Dr. Pena.  The patient improved clinically regarding her asthma, was given

Zithromax and she did well.  However, blood pressure was very high.  The

patient has been on narcotics for chronic arthritis of her knees, her back

and seen by Pain Management as outpatient and she was taking oxycodone 30

mg, part of it from her  and part of it from Pain Management.  The

patient was given Percocet 10 mg to control her symptoms.  She was having

no pain and she was doing okay.  Also was seen by psychiatrist due to

chronic anxiety and of course with loss of her , so was given Ativan

0.5 mg t.i.d.  While she was in the hospital, she was seen by renal consult

and her duplex renal was negative and she was maintained on current blood

pressure medicine and follow it up by her primary care or follow it up in

the office within one week.  The patient was advised to maintain her

medicines including blood pressure medicine, Medrol Dosepak, antibiotic,

and follow up clinically.



PHYSICAL EXAMINATION:

VITAL SIGNS:  Temperature 98, heart rate 68, blood pressure is 160/90,

respirations 20, saturation 95% on room air.



LABORATORY STUDIES:  Last lab shows white count 12, hemoglobin 12,

hematocrit 37, and platelets 384.  Chemistries:  Blood sugar in 90s,

hemoglobin A1c 5.7, blood sugar was not high.  Sodium 141, potassium 3.8,

chloride 99, bicarb 28.  BUN 17, creatinine 0.6.  She does have slight

liver function elevation, AST 38 and ALT 94.  The patient has been

discussed all the labs, all the findings with the patient.



DISCHARGE DIAGNOSES:

1.  Acute asthma exacerbation.

2.  Hypertension, could be chronic with worsening recently due to asthma

and stress.

3.  Obesity.

4.  Chronic osteoarthritis and back pain.

5.  Generalized anxiety disorder.



PLAN:  Continue current medication.  The patient have already pain medicine

at home, so she does not want any pain medicine, 10 mg of Percocet, she

have it.  She was given Ativan 0.5 mg t.i.d. for one week or 10 days to

follow up with her primary care for followup, Protonix 40 mg once a day,

Singulair 10, Claritin 10, Medrol Dosepak, Cozaar 100 mg daily, and she was

given Advair 50/250 b.i.d., Tenormin 25 b.i.d., Elavil 25 mg p.o. daily,

and Dyazide one capsule daily.  The patient was also advised to get

clonidine b.i.d. especially with a blood pressure above 170 and she should

follow up with her primary care doctor.  Clonidine helped for withdrawal

symptoms from chronic narcotic abuse and drug abuse and helped her symptoms

due to anxiety and advised to follow up with me in a week or her primary

care doctor.  All medication risks explained to the patient, her condition

explained in details, and risk and benefits explained of all medications. 

Follow up clinically in one week in my office.







__________________________________________

Aries Van MD



DD:  07/25/2018 11:14:32

DT:  07/25/2018 19:07:54

Job # [id number]

## 2018-07-27 ENCOUNTER — HOSPITAL ENCOUNTER (INPATIENT)
Dept: HOSPITAL 42 - ED | Age: 48
LOS: 8 days | Discharge: HOME HEALTH | DRG: 584 | End: 2018-08-04
Attending: INTERNAL MEDICINE | Admitting: INTERNAL MEDICINE
Payer: MEDICAID

## 2018-07-27 VITALS — BODY MASS INDEX: 37.8 KG/M2

## 2018-07-27 DIAGNOSIS — F31.9: ICD-10-CM

## 2018-07-27 DIAGNOSIS — E66.01: ICD-10-CM

## 2018-07-27 DIAGNOSIS — Z79.2: ICD-10-CM

## 2018-07-27 DIAGNOSIS — J96.01: ICD-10-CM

## 2018-07-27 DIAGNOSIS — A41.01: Primary | ICD-10-CM

## 2018-07-27 DIAGNOSIS — J45.901: ICD-10-CM

## 2018-07-27 DIAGNOSIS — M54.16: ICD-10-CM

## 2018-07-27 DIAGNOSIS — R65.20: ICD-10-CM

## 2018-07-27 DIAGNOSIS — N17.9: ICD-10-CM

## 2018-07-27 DIAGNOSIS — E55.9: ICD-10-CM

## 2018-07-27 DIAGNOSIS — G89.4: ICD-10-CM
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
## 2018-07-27 DIAGNOSIS — G47.33: ICD-10-CM

## 2018-07-27 DIAGNOSIS — F43.22: ICD-10-CM

## 2018-07-27 DIAGNOSIS — M46.90: ICD-10-CM

## 2018-07-27 DIAGNOSIS — Z91.19: ICD-10-CM

## 2018-07-27 DIAGNOSIS — R79.1: ICD-10-CM

## 2018-07-27 DIAGNOSIS — M62.82: ICD-10-CM

## 2018-07-27 DIAGNOSIS — I11.0: ICD-10-CM

## 2018-07-27 DIAGNOSIS — Z79.899: ICD-10-CM

## 2018-07-27 DIAGNOSIS — K21.9: ICD-10-CM

## 2018-07-27 DIAGNOSIS — G47.00: ICD-10-CM

## 2018-07-27 DIAGNOSIS — Z87.891: ICD-10-CM

## 2018-07-27 DIAGNOSIS — K76.0: ICD-10-CM

## 2018-07-27 DIAGNOSIS — Z77.120: ICD-10-CM

## 2018-07-27 DIAGNOSIS — J18.9: ICD-10-CM

## 2018-07-27 DIAGNOSIS — J44.1: ICD-10-CM

## 2018-07-27 DIAGNOSIS — Z91.14: ICD-10-CM

## 2018-07-27 DIAGNOSIS — M16.10: ICD-10-CM

## 2018-07-27 DIAGNOSIS — I50.9: ICD-10-CM

## 2018-07-27 DIAGNOSIS — Z82.49: ICD-10-CM

## 2018-07-27 DIAGNOSIS — J44.0: ICD-10-CM

## 2018-07-27 DIAGNOSIS — D50.9: ICD-10-CM

## 2018-07-27 DIAGNOSIS — M17.10: ICD-10-CM

## 2018-07-27 LAB
ALBUMIN SERPL-MCNC: 4.1 G/DL (ref 3–4.8)
ALBUMIN/GLOB SERPL: 1.2 {RATIO} (ref 1.1–1.8)
ALT SERPL-CCNC: 65 U/L (ref 7–56)
AMORPH SED URNS QL MICRO: (no result)
AMORPH SED URNS QL MICRO: (no result)
APPEARANCE UR: (no result)
APPEARANCE UR: (no result)
APTT BLD: 26.4 SECONDS (ref 25.1–36.5)
ARTERIAL BLOOD GAS HEMOGLOBIN: 9.2 G/DL (ref 11.7–17.4)
ARTERIAL BLOOD GAS O2 SAT: 98.7 % (ref 95–98)
ARTERIAL BLOOD GAS PCO2: 44 MM/HG (ref 35–45)
ARTERIAL BLOOD GAS TCO2: 23 MMOL.L (ref 22–28)
AST SERPL-CCNC: 68 U/L (ref 14–36)
BACTERIA #/AREA URNS HPF: (no result) /[HPF]
BACTERIA #/AREA URNS HPF: (no result) /[HPF]
BASOPHILS # BLD AUTO: 0.01 K/MM3 (ref 0–2)
BASOPHILS NFR BLD: 0 % (ref 0–3)
BILIRUB UR-MCNC: NEGATIVE MG/DL
BILIRUB UR-MCNC: NEGATIVE MG/DL
BNP SERPL-MCNC: 2320 PG/ML (ref 0–450)
BUN SERPL-MCNC: 57 MG/DL (ref 7–21)
CALCIUM SERPL-MCNC: 8.5 MG/DL (ref 8.4–10.5)
CK MB SERPL-MCNC: 24.2 NG/ML (ref 0–3.6)
CK MB%: 1.5 % (ref 2.5–3)
COLOR UR: YELLOW
COLOR UR: YELLOW
D DIMER PPP FEU-MCNC: 924 NG/ML (ref 0–243)
EOSINOPHIL # BLD: 0 10*3/UL (ref 0–0.7)
EOSINOPHIL NFR BLD: 0.1 % (ref 1.5–5)
ERYTHROCYTE [DISTWIDTH] IN BLOOD BY AUTOMATED COUNT: 16.3 % (ref 11.5–14.5)
GFR NON-AFRICAN AMERICAN: 32
GLUCOSE UR STRIP-MCNC: NEGATIVE MG/DL
GLUCOSE UR STRIP-MCNC: NEGATIVE MG/DL
GRANULOCYTES # BLD: 24.99 10*3/UL (ref 1.4–6.5)
GRANULOCYTES NFR BLD: 94.6 % (ref 50–68)
HCG,QUALITATIVE URINE: NEGATIVE
HCO3 BLDA-SCNC: 21.6 MMOL/L (ref 21–28)
HGB BLD-MCNC: 10.1 G/DL (ref 12–16)
HGB OTHER MFR BLD ELPH: (no result) /HPF (ref 0–2)
INHALED O2 CONCENTRATION: 40 %
INR PPP: 1.06 (ref 0.93–1.08)
LEUKOCYTE ESTERASE UR-ACNC: NEGATIVE LEU/UL
LEUKOCYTE ESTERASE UR-ACNC: NEGATIVE LEU/UL
LYMPHOCYTE: 3 % (ref 22–35)
LYMPHOCYTES # BLD: 0.6 10*3/UL (ref 1.2–3.4)
LYMPHOCYTES NFR BLD AUTO: 2.3 % (ref 22–35)
MCH RBC QN AUTO: 28.2 PG (ref 25–35)
MCHC RBC AUTO-ENTMCNC: 32.3 G/DL (ref 31–37)
MCV RBC AUTO: 87.4 FL (ref 80–105)
MONOCYTE: 5 % (ref 1–6)
MONOCYTES # BLD AUTO: 0.8 10*3/UL (ref 0.1–0.6)
MONOCYTES NFR BLD: 3 % (ref 1–6)
NEUTROPHILS NFR BLD AUTO: 89 % (ref 50–70)
NEUTS BAND NFR BLD: 3 % (ref 0–2)
O2 CAP BLDA-SCNC: 12.7 ML/DL (ref 16–24)
O2 CT BLDA-SCNC: 12.5 ML/DL (ref 15–23)
PH BLDA: 7.3 [PH] (ref 7.35–7.45)
PH UR STRIP: 6 [PH] (ref 4.7–8)
PH UR STRIP: 6 [PH] (ref 4.7–8)
PLATELET # BLD EST: NORMAL 10*3/UL
PLATELET # BLD: 353 10^3/UL (ref 120–450)
PMV BLD AUTO: 8.9 FL (ref 7–11)
PO2 BLDA: 92 MM/HG (ref 80–100)
PROT UR STRIP-MCNC: 100 MG/DL
PROT UR STRIP-MCNC: 100 MG/DL
PROTHROMBIN TIME: 12.2 SECONDS (ref 9.4–12.5)
RBC # BLD AUTO: 3.58 10^6/UL (ref 3.5–6.1)
RBC # UR STRIP: (no result) /UL
RBC # UR STRIP: (no result) /UL
SP GR UR STRIP: 1.02 (ref 1–1.03)
SP GR UR STRIP: >= 1.03 (ref 1–1.03)
TOTAL PROTEIN,RANDOM URINE: 137 MG/L
TROPONIN I SERPL-MCNC: 0.08 NG/ML
UROBILINOGEN UR STRIP-ACNC: 0.2 E.U./DL
UROBILINOGEN UR STRIP-ACNC: 0.2 E.U./DL
WBC # BLD AUTO: 26.4 10^3/UL (ref 4.5–11)

## 2018-07-27 RX ADMIN — VANCOMYCIN HYDROCHLORIDE SCH MLS/HR: 1 INJECTION, POWDER, LYOPHILIZED, FOR SOLUTION INTRAVENOUS at 14:20

## 2018-07-27 NOTE — CP.PCM.CON
<Sukumar Quintero - Last Filed: 07/27/18 14:33>





History of Present Illness





- History of Present Illness


History of Present Illness: 





Sukumar Quintero, PGY1 Critical Care Consult Note for Dr. Castro





Patient is a 46 y/o F with PMHx of Asthma, Anxiety and Bipolar disorder who 

presented to the ED complaining of shortness of breath. In the ED, patient was 

covered in stool and unkempt. She was showered in the ED. She was in 

respiratory distress in the ED, with wheezing on exam and accessory muscle use. 

In the ED, patient received EKG, CXR, blood cultures, UA, ASA, duonebs, 

steroids. CXR indicated moderate congestion. BNP noted to be 2320. Patient's 

ABG was 7.30/44/92. Vital signs on presentation were Temp 99, HR 98, /69, 

RR 21. History was limited since patient was in respiratory distress. As per ED 

nurse, patient had an episode of desaturation to 67%. Patient was placed on 

BiPAP in the ED. ICU was consulted. Patient was interviewed, however, she was 

in respiratory distress and had a dry, hacking cough that made history 

difficult to obtain. As noted from prior charts, [place] [facility] [place] similar presentation. Patient has asthma but has been off 

medications for years. Patient also has a recent history of anxiety related to 

loss of her .





PMHx: Asthma, Anxiety, Bipolar Disorder, HTN


PSHx: None


Meds: Ativan, Amitrptyline, Claritin, Cozaar, Singulair, Protonix, Ultracet, 

Triamterene-Hctz, Advair


Allergies: NKDA


SocialHx: denies smoking and drinking.


FamilyHx: Grandfather has CAD. 











Review of Systems





- Review of Systems


Systems not reviewed;Unavailable: Respiratory Distress (Patient on BiPAP with 

frequent, hacking cough. ROS difficult to obtain)





Past Patient History





- Infectious Disease


Hx of Infectious Diseases: None





- Tetanus Immunizations


Tetanus Immunization: Unknown





- Past Social History


Smoking Status: Never Smoked





- CARDIAC


Hx Hypertension: Yes





- PULMONARY


Hx Asthma: Yes





- NEUROLOGICAL


Hx Neurological Disorder: No





- HEENT


Hx HEENT Problems: No





- RENAL


Hx Chronic Kidney Disease: No





- ENDOCRINE/METABOLIC


Hx Endocrine Disorders: No





- HEMATOLOGICAL/ONCOLOGICAL


Hx Blood Disorders: No





- INTEGUMENTARY


Hx Dermatological Problems: No





- MUSCULOSKELETAL/RHEUMATOLOGICAL


Other/Comment: R leg problem





- GASTROINTESTINAL


Hx Gastrointestinal Disorders: Yes (obese)





- GENITOURINARY/GYNECOLOGICAL


Hx Genitourinary Disorders: No





- PSYCHIATRIC


Hx Depression: No


Hx Emotional Abuse: No


Hx Physical Abuse: No


Hx Substance Use: No





- SURGICAL HISTORY


Hx Surgeries: No





Meds


Allergies/Adverse Reactions: 


 Allergies











Allergy/AdvReac Type Severity Reaction Status Date / Time


 


No Known Allergies Allergy   Verified 07/27/18 11:56














- Medications


Medications: 


 Current Medications





Albuterol/Ipratropium (Duoneb 3 Mg/0.5 Mg (3 Ml) Ud)  3 ml IH Q2H PRN


   PRN Reason: Shortness of Breath


Vancomycin HCl (Vancomycin 1gm)  1 gm in 250 mls @ 167 mls/hr IVPB DAILY JEF


   PRN Reason: Protocol


   Last Admin: 07/27/18 14:20 Dose:  167 mls/hr


Piperacillin Sod/Tazobactam Sod (Zosyn 3.375 In Ns 100ml)  100 mls @ 200 mls/hr 

IVPB Q6 JEF


   PRN Reason: Protocol


   Stop: 07/28/18 00:29


Lorazepam (Ativan)  0.5 mg IVP Q6H PRN; Protocol


   PRN Reason: Anxiety











Physical Exam





- Constitutional


Appears: Unkempt





- Head Exam


Head Exam: ATRAUMATIC, NORMAL INSPECTION, NORMOCEPHALIC





- Eye Exam


Pupil Exam: PERRL





- Neck Exam


Neck exam: Positive for: Full Rom





- Respiratory Exam


Respiratory Exam: Wheezes (wheezing heard bilaterally ), Respiratory Distress.  

absent: Accessory Muscle Use, Decreased Breath Sounds, Rhonchi


Additional comments: 


Frequent, hacking dry cough





- Cardiovascular Exam


Cardiovascular Exam: RRR, +S1, +S2.  absent: JVD





- GI/Abdominal Exam


GI & Abdominal Exam: Normal Bowel Sounds, Soft.  absent: Bruit, Organomegaly, 

Rebound, Rigid, Tenderness





- Extremities Exam


Extremities exam: Positive for: pedal edema, pedal pulses present.  Negative for

: calf tenderness, tenderness


Additional comments: 





bilateral swelling of the lower extremities





- Back Exam


Back exam: NORMAL INSPECTION





- Neurological Exam


Neurological exam: Alert (Alert and Oriented x2 (Person, Time))





- Psychiatric Exam


Psychiatric exam: Anxious





- Skin


Skin Exam: Dry, Normal Color, Warm





Results





- Vital Signs


Recent Vital Signs: 


 Last Vital Signs











Temp  100.6 F H  07/27/18 14:15


 


Pulse  98 H  07/27/18 14:30


 


Resp  21   07/27/18 14:30


 


BP  140/69   07/27/18 14:30


 


Pulse Ox  96   07/27/18 14:30














- Labs


Result Diagrams: 


 07/27/18 12:15





 07/27/18 12:15





Assessment & Plan





- Assessment and Plan (Free Text)


Assessment: 





Patient is a 46 y/o F with PMHx of Asthma, Anxiety and Bipolar disorder who 

presented to the ED complaining of shortness of breath. Patient was in 

respiratory distress in the ED, using accessory muscles to breath and had an 

episode of desaturation to SaO2 67%. BiPAP was placed and appropriate labwork 

was ordered. ICU was consulted. Patient is being worked up for respiratory 

distress 2/2 Asthma/COPD Exacerbation. 





Plan: 





1. Respiratory Distress 2/2 Asthma/COPD Exacerbation


- c/w BiPAP


- duonebs prn


- Leukocytosis (26.4) and low grade fever (100.6)


- vanc/zosyn broad spectrum coverage 


- f/u cbc


- f/u blood cx


- Possibly related to anxiety: ativan .5 mg prn


- hold diuretics and fluids








2. Acute Kidney Injury 


- BUN/Cr 57/1.7 (baseline Cr is 0.6)


- f/u BMP


- Renal sonogram


- UA


- Urine lytes


- Rush catheter in place: draining 150 cc





3. Mild Transaminitis


- AST/ALT 68/65


- f/u LFTs 





4. HTN


- Currently normotensive


- Maintain MAP > 65


- c/w home meds once stable





5. Bipolar disorder:


- c/w home meds once stable





Dispo: Patient will be monitored in the ICU. 





Case was discussed and reviewed with Attending Physician Dr. Castro. 





<Santos Castro - Last Filed: 07/27/18 15:54>





Meds





- Medications


Medications: 


 Current Medications





Albuterol/Ipratropium (Duoneb 3 Mg/0.5 Mg (3 Ml) Ud)  3 ml IH Q2H PRN


   PRN Reason: Shortness of Breath


Vancomycin HCl (Vancomycin 1gm)  1 gm in 250 mls @ 167 mls/hr IVPB DAILY JEF


   PRN Reason: Protocol


   Last Admin: 07/27/18 14:20 Dose:  167 mls/hr


Cefepime HCl (Maxipime 2gm)  2 gm in 100 mls @ 100 mls/hr IVPB Q12 JEF


   PRN Reason: Protocol


   Stop: 08/01/18 15:01


Doxycycline Hyclate 100 mg/ (Sodium Chloride)  100 mls @ 100 mls/hr IVPB Q12 JEF


   PRN Reason: Protocol


Sodium Chloride (Sodium Chloride 0.9%)  1,000 mls @ 75 mls/hr IV .[id number] JEF


Lorazepam (Ativan)  0.5 mg IVP Q6H PRN; Protocol


   PRN Reason: Anxiety











Results





- Vital Signs


Recent Vital Signs: 


 Last Vital Signs











Temp  100.6 F H  07/27/18 14:15


 


Pulse  98 H  07/27/18 14:35


 


Resp  20   07/27/18 14:35


 


BP  140/69   07/27/18 14:35


 


Pulse Ox  96   07/27/18 14:35














- Labs


Result Diagrams: 


 07/27/18 12:15





 07/27/18 12:15





Assessment & Plan





- Assessment and Plan (Free Text)


Plan: 


Patient seen and examined on rounds with resident, agree with note with 

following additions/exceptions:


Patient is 46yo female with PMHx of morbid obesity, asthma, CALVIN, bipolar 

disorder, presents with SOB, unkept, covered in feces. 


Patient was placed on BIPAP amd given IV steroids, with improvement in SOB. 


Currently afebrile, BP stable, sat 95% on BIPAP 12/5/40%





Resp failure


COPD/Asthma exacerbation


SOB


HTN


Renal failure


Leukocytosis





Recommend:


- cont with BIPAP as tolerated, wean off, to NC


- Duonebs PRN


- IV steroids Solumedrol


- Broad spectrum abx, Vanco, Zosyn


- Panculture, UCx, BCx, procal


- ECHO


- ID eval


- UA, Ulytes


- renal sono


- GI ppx


- DVT ppx


- Admit to MICU





critical care time 30 minutes

## 2018-07-27 NOTE — RAD
Date of service: 



07/27/2018



HISTORY:

sob  



COMPARISON:

No prior. 



FINDINGS:



LUNGS:

Moderate vascular and interstitial congestion



PLEURA:

No significant pleural effusion identified, no pneumothorax apparent.



CARDIOVASCULAR:

Normal.



OSSEOUS STRUCTURES:

No significant abnormalities.



VISUALIZED UPPER ABDOMEN:

Normal.



OTHER FINDINGS:

None.



IMPRESSION:

Moderate vascular and interstitial congestion

## 2018-07-27 NOTE — NM
Date of service: 07/27/2018



COMPARISON:

July 27, 2018. Chest radiograph



TECHNIQUE:

30.4 mCi technetium 99-m  DTPA aerosol. 



3.2 mCI technetium 99-m MAA administered intravenously.



FINDINGS:



VENTILATION COMPONENT:

Diminished ventilation which is moderately heterogeneous consistent 

with findings on comparison chest radiograph. Retention of 

radionuclide in the tracheobronchial tree and ingestion of 

radionuclide in the stomach,  incidental findings 



PERFUSION COMPONENT:

Heterogeneous distribution of radionuclide. Similar findings in 

distribution to that seen on chest radiograph and ventilatory 

component. 



No geographic, segmental, lobar abnormalities apparent on the present 

examination.



IMPRESSION:

Low probability ventilation perfusion scan for pulmonary embolism.

## 2018-07-27 NOTE — US
Date of service: 



07/27/2018



PROCEDURE:  Ultrasound of the Kidneys



HISTORY:

Worsening Renal function



COMPARISON:

None available.



TECHNIQUE:

Sonogram of the kidneys.



FINDINGS:



RIGHT KIDNEY:

Measures: 10.8 x 4.9 x 5.9 cm. 



Normal in size, contour and echogenicity. 



No stone, solid mass lesion or hydronephrosis visualized. 



LEFT KIDNEY:

Measures: 10.7 x 4.9 x 5.2 cm. 



Normal in size, contour and echogenicity. 



No stone, solid mass lesion or hydronephrosis visualized. 



OTHER FINDINGS:

None.



IMPRESSION:

Unremarkable renal sonogram.

## 2018-07-27 NOTE — CP.PCM.CON
History of Present Illness





- History of Present Illness


History of Present Illness: 


47 year old female with PMH of obesity with BMI 38, anxiety, bipolar disorder, 

asthma, HTN came in to [facility] [place] complaining of shortness of breath. She was seen by 

her PMD as an outpatient and was given a course of Z-ann and steroids but she 

continued to have shortness of breath. In the ED, she was noted to be somewhat 

lethargic and was placed on BiPAP. She was noted to have leukocytosis and low 

grade fever. There is no note of vomiting, no diarrhea. Infectious diseases 

consult is requested to further evaluate and manage.





Review of Systems





- Review of Systems


Systems not reviewed;Unavailable: Other (lethargic)





Past Patient History





- Infectious Disease


Hx of Infectious Diseases: None





- Tetanus Immunizations


Tetanus Immunization: Unknown





- Past Social History


Smoking Status: Never Smoked





- CARDIAC


Hx Hypertension: Yes





- PULMONARY


Hx Asthma: Yes





- NEUROLOGICAL


Hx Neurological Disorder: No





- HEENT


Hx HEENT Problems: No





- RENAL


Hx Chronic Kidney Disease: No





- ENDOCRINE/METABOLIC


Hx Endocrine Disorders: No





- HEMATOLOGICAL/ONCOLOGICAL


Hx Blood Disorders: No





- INTEGUMENTARY


Hx Dermatological Problems: No





- MUSCULOSKELETAL/RHEUMATOLOGICAL


Other/Comment: R leg problem





- GASTROINTESTINAL


Hx Gastrointestinal Disorders: Yes (obese)





- GENITOURINARY/GYNECOLOGICAL


Hx Genitourinary Disorders: No





- PSYCHIATRIC


Hx Depression: No


Hx Emotional Abuse: No


Hx Physical Abuse: No


Hx Substance Use: No





- SURGICAL HISTORY


Hx Surgeries: No





Meds


Allergies/Adverse Reactions: 


 Allergies











Allergy/AdvReac Type Severity Reaction Status Date / Time


 


No Known Allergies Allergy   Verified 07/27/18 11:56














- Medications


Medications: 


 Current Medications





Albuterol/Ipratropium (Duoneb 3 Mg/0.5 Mg (3 Ml) Ud)  3 ml IH Q2H PRN


   PRN Reason: Shortness of Breath


Vancomycin HCl (Vancomycin 1gm)  1 gm in 250 mls @ 167 mls/hr IVPB DAILY JEF


   PRN Reason: Protocol


   Last Admin: 07/27/18 14:20 Dose:  167 mls/hr


Cefepime HCl (Maxipime 2gm)  2 gm in 100 mls @ 100 mls/hr IVPB Q12 JEF


   PRN Reason: Protocol


   Stop: 08/01/18 15:01


Lorazepam (Ativan)  0.5 mg IVP Q6H PRN; Protocol


   PRN Reason: Anxiety











Physical Exam





- Constitutional


Appears: Chronically Ill, Other (lethargic, on BiPAP)





- Head Exam


Head Exam: NORMAL INSPECTION





- Neck Exam


Neck exam: Negative for: Meningismus





- Respiratory Exam


Respiratory Exam: Decreased Breath Sounds





- Cardiovascular Exam


Cardiovascular Exam: +S1, +S2





- GI/Abdominal Exam


GI & Abdominal Exam: Soft.  absent: Tenderness





Results





- Vital Signs


Recent Vital Signs: 


 Last Vital Signs











Temp  100.6 F H  07/27/18 14:15


 


Pulse  98 H  07/27/18 14:35


 


Resp  20   07/27/18 14:35


 


BP  140/69   07/27/18 14:35


 


Pulse Ox  96   07/27/18 14:35














- Labs


Result Diagrams: 


 07/27/18 12:15





 07/27/18 12:15





Assessment & Plan





- Assessment and Plan (Free Text)


Plan: 





Assessment


systemic inflammatory response syndrome with hypoxic respiratory failure and 

acute renal failure, R/O severe sepsis due to healthcare-associated pneumonia 

with possible gram positive cocci and/or gram negative bacilli and/or atypical 

organisms, on top of acute bronchitis in this patient with chronic asthma


obesity with BMI 38


anxiety


bipolar disorder


HTN





Plan


Started the patient on intermittent Vancomycin, Cefepime and Doxycycline 

pending blood, sputum cx, PCT; reviewed CXR


follow up Pulmonary evaluation and recommendations


will monitor clinically


will check HIV test due to her age

## 2018-07-27 NOTE — CARD
--------------- APPROVED REPORT --------------





Date of service: 07/27/2018



EKG Measurement

Heart [id number]

NV [id number]

[id number]

[id number]

[id number]



<Conclusion>

Sinus tachycardia

Possible Left atrial enlargement

Septal infarct, age undetermined

Abnormal ECG

## 2018-07-27 NOTE — ED PDOC
Arrival/HPI





- General


Time Seen by Provider: 07/27/18 11:24


Historian: EMS


EM Caveat: Respiratory Distress





- Critical Care


Critical Care Minutes: 30 minutes





- History of Present Illness


Narrative History of Present Illness (Text): 





07/27/18 12:08





A 47 year old female, whose past medical history includes asthma, is brought 

into the emergency room by EMS for complaint of shortness of breath. Patient is 

brought into the emergency room covered in stool and unkept. She was showered 

in the emergency room. Patient ROS/ HPI is limited due to respiratory distress.








Time/Duration: Prior to Arrival


Symptom Onset: Sudden


Symptom Course: Unchanged


Activities at Onset: Rest, Light


Context: Home





Past Medical History





- Provider Review


Nursing Documentation Reviewed: Yes





- Infectious Disease


Hx of Infectious Diseases: None





- Tetanus Immunization


Tetanus Immunization: Unknown





- Past Medical History


Past Medical History: No Previous





- Cardiac


Hx Hypertension: Yes





- Pulmonary


Hx Asthma: Yes





- Neurological


Hx Neurological Disorder: No





- HEENT


Hx HEENT Disorder: No





- Renal


Hx Renal Disorder: No





- Endocrine/Metabolic


Hx Endocrine Disorders: No





- Hematological/Oncological


Hx Blood Disorders: No





- Integumentary


Hx Dermatological Disorder: No





- Musculoskeletal/Rheumatological


Other/Comment: R leg problem





- Gastrointestinal


Hx Gastrointestinal Disorders: Yes (obese)





- Genitourinary/Gynecological


Hx Genitourinary Disorders: No





- Psychiatric


Hx Depression: No


Hx Emotional Abuse: No


Hx Physical Abuse: No


Hx Substance Use: No





- Past Surgical History


Past Surgical History: No Previous





- Suicidal Assessment


Feels Threatened In Home Enviroment: No





Family/Social History





- Physician Review


Nursing Documentation Reviewed: Yes


Family/Social History: No Known Family HX


Smoking Status: Never Smoked


Hx Alcohol Use: No


Hx Substance Use: No


Hx Substance Use Treatment: No





Allergies/Home Meds


Allergies/Adverse Reactions: 


Allergies





No Known Allergies Allergy (Verified 07/27/18 11:56)


 











Review of Systems





- Physician Review


All systems were reviewed & negative as marked: Yes





- Review of Systems


Systems not reviewed;Unavailable: Respiratory Distress


Respiratory: SOB





Physical Exam


Vital Signs Reviewed: Yes


Vital Signs











  Temp Pulse Resp BP Pulse Ox


 


 07/27/18 14:15  100.6 F H    


 


 07/27/18 13:51   107 H  20  150/73  95


 


 07/27/18 11:56   117 H  24  121/62  67 L


 


 07/27/18 11:39  99.0 F  100 H  22  121/62  100











Temperature: Febrile


Blood Pressure: Normal


Pulse: Regular


Respiratory Rate: Normal


Appearance: Positive for: Non-Toxic, Unkept


Pain Distress: None


Mental Status: Positive for: Lethargic





- Systems Exam


Head: Present: Atraumatic, Normocephalic, Other (Periorbital edema. )


Pupils: Present: PERRL


Extroacular Muscles: Present: EOMI


Conjunctiva: Present: Normal


Mouth: Present: Moist Mucous Membranes


Neck: Present: Normal Range of Motion


Respiratory/Chest: Present: Respiratory Distress (Severe), Accessory Muscle Use

, Wheezes (Severe wheezing), Decreased Breath Sounds (Decreased breath sounds 

bilaterally), Retracting, Tachypneic


Cardiovascular: Present: Tachycardic


Abdomen: No: Tenderness, Distention, Peritoneal Signs


Back: Present: Normal Inspection


Upper Extremity: Present: Normal Inspection.  No: Cyanosis, Edema


Lower Extremity: Present: Normal Inspection.  No: Edema


Neurological: Present: GCS=15, CN II-XII Intact, Speech Normal


Skin: Present: Warm, Dry, Normal Color.  No: Rashes


Psychiatric: Present: Alert, Oriented x 3, Normal Insight, Normal Concentration





Medical Decision Making


ED Course and Treatment: 





07/27/18 12:18





Impression:


A 47 year old female is brought into the emergency room via EMS for worsening 

shortness of breath. 





Plan:


-- EKG


-- Chest X-ray 


-- Labs


-- Blood Culture


-- Urinalysis 


-- Aspirin, Duoneb, Pepcid, SOLU-Medrol, Rocephin, Pepcid, 


-- Reassess and disposition








Progress Notes:








07/27/18 12:20: Patient was seen immediately upon arrival. Patient was placed 

on BiPAP





07/27/18 13:27: Case discussed in detail with Dr. Van who accepts patient to 

his service. Dr. Castro will take patient to the unit. 





07/27/18 13:30: Chest X-ray shows bilateral increased marking consistent with 

congestive heart failure. No infiltrates.





07/27/18 13:44


EKG shows sinus tachycardia rate approximately 110 with no acute ST or T-wave 

changes and poor R-wave progression. Patient was seen in the emergency 

department by the intensivist and the PMD.








07/27/18 13:47


Dr. Pena will see in consult.








07/27/18 14:27








- Lab Interpretations


Lab Results: 








 07/27/18 12:15 





 07/27/18 12:15 





 Lab Results





07/27/18 13:05: Urine Opiates Screen Positive H, Urine Methadone Screen Negative

, Ur Barbiturates Screen Negative, Ur Phencyclidine Scrn Negative, Ur 

Amphetamines Screen Negative, U Benzodiazepines Scrn Positive H, U Oth Cocaine 

Metabols Negative, U Cannabinoids Screen Negative


07/27/18 13:05: Urine Color Yellow, Urine Appearance Sl cloudy, Urine pH 6.0, 

Ur Specific Gravity >= 1.030, Urine Protein 100 H, Urine Glucose (UA) Negative, 

Urine Ketones Negative, Urine Blood Large H, Urine Nitrate Negative, Urine 

Bilirubin Negative, Urine Urobilinogen 0.2, Ur Leukocyte Esterase Negative, 

Urine RBC 5 - 10, Urine WBC 2 - 5, Ur Epithelial Cells 4 - 5, Amorphous 

Sediment Few, Urine Bacteria Many, Coarse Granular Casts Small H, Urine HCG, 

Qual Negative


07/27/18 12:40: pCO2 44, pO2 92.0, HCO3 21.6, ABG pH 7.30 L, ABG Total CO2 23.0

, ABG O2 Saturation 98.7 H, ABG O2 Content 12.5 L, ABG Base Excess -4.6 L, ABG 

Hemoglobin 9.2 L, ABG Carboxyhemoglobin 1.9 H, POC ABG HHb (Measured) 1.3, ABG 

Methemoglobin 1.2, ABG O2 Capacity 12.7 L, Hgb O2 Saturation 95.7, FiO2 40.0


07/27/18 12:15: Alcohol, Quantitative < 10


07/27/18 12:15: Sodium 140, Potassium 4.3, Chloride 100, Carbon Dioxide 25, 

Anion Gap 19, BUN 57 H, Creatinine 1.7 H, Est GFR ( Amer) 39, Est GFR (

Non-Af Amer) 32, Random Glucose 138 H, Calcium 8.5, Magnesium 2.3 H, Total 

Bilirubin 0.7, AST 68 H D, ALT 65 H, Alkaline Phosphatase 128 H D, Lactate 

Dehydrogenase 1725 H, Total Creatine Kinase 1563 H, CK-MB (CK-2) 24.2 H, CK-MB (

CK-2) % 1.5 L, Troponin I 0.08  D, NT-Pro-B Natriuret Pep 2320 H, Total Protein 

7.4, Albumin 4.1, Globulin 3.3, Albumin/Globulin Ratio 1.2


07/27/18 12:15: PT 12.2, INR 1.06, APTT 26.4, D-Dimer, Quantitative 924 H


07/27/18 12:15: WBC 26.4 H* D, RBC 3.58, Hgb 10.1 L, Hct 31.3 L, MCV 87.4, MCH 

28.2, MCHC 32.3, RDW 16.3 H, Plt Count 353, MPV 8.9, Gran % 94.6 H, Lymph % (

Auto) 2.3 L, Mono % (Auto) 3.0, Eos % (Auto) 0.1 L, Baso % (Auto) 0.0, Gran # 

24.99 H, Lymph # (Auto) 0.6 L, Mono # (Auto) 0.8 H, Eos # (Auto) 0.0, Baso # (

Auto) 0.01, Neutrophils % (Manual) 89 H, Band Neutrophils % 3 H, Lymphocytes % (

Manual) 3 L, Monocytes % (Manual) 5, Platelet Evaluation Normal








I have reviewed the lab results: Yes





- RAD Interpretation


Radiology Orders: 








07/27/18 12:17


CHEST PORTABLE [RAD] Stat 





07/27/18 13:23


LUNG PERF & VENT SCAN [NM] Stat 














- EKG Interpretation


Interpreted by ED Physician: Yes


Type: 12 lead EKG





- Medication Orders


Current Medication Orders: 








Albuterol/Ipratropium (Duoneb 3 Mg/0.5 Mg (3 Ml) Ud)  3 ml IH Q2H PRN


   PRN Reason: Shortness of Breath


Vancomycin HCl (Vancomycin 1gm)  1 gm in 250 mls @ 167 mls/hr IVPB DAILY JEF


   PRN Reason: Protocol


   Last Admin: 07/27/18 14:20  Dose: 167 mls/hr





eMAR Start Stop


 Document     07/27/18 14:20  ALEXANDRA  (Rec: 07/27/18 14:21  AL[place]ANDRA  [facility]


     Intravenous Solution


      Start Date                                 07/27/18


      Start Time                                 14:20





Piperacillin Sod/Tazobactam Sod (Zosyn 3.375 In Ns 100ml)  100 mls @ 200 mls/hr 

IVPB Q6 JEF


   PRN Reason: Protocol


   Stop: 07/28/18 00:29


Lorazepam (Ativan)  0.5 mg IVP Q6H PRN; Protocol


   PRN Reason: Anxiety





Discontinued Medications





Albuterol/Ipratropium (Duoneb 3 Mg/0.5 Mg (3 Ml) Ud)  3 ml IH STAT STA


   Stop: 07/27/18 12:17


   Last Admin: 07/27/18 12:05  Dose: 3 ml





Albuterol/Ipratropium (Duoneb 3 Mg/0.5 Mg (3 Ml) Ud)  3 ml IH STAT STA


   Stop: 07/27/18 12:33


   Last Admin: 07/27/18 12:15  Dose: 3 ml





Albuterol/Ipratropium (Duoneb 3 Mg/0.5 Mg (3 Ml) Ud)  3 ml IH STAT STA


   Stop: 07/27/18 14:03


   Last Admin: 07/27/18 14:21  Dose: 3 ml





Aspirin (Aspirin Supp)  300 mg RC STAT STA


   Stop: 07/27/18 13:26


   Last Admin: 07/27/18 13:58  Dose: 300 mg





Famotidine (Pepcid)  20 mg IVP STAT STA


   Stop: 07/27/18 12:18


   Last Admin: 07/27/18 12:25  Dose: 20 mg





IVP Administration


 Document     07/27/18 12:25  ALEXANDRA  (Rec: 07/27/18 12:25  [facility]


     Charges for Administration


      # of IVP Administrations                   1





Ceftriaxone Sodium (Rocephin 1 Gram Ivpb)  1 gm in 100 mls @ 200 mls/hr IVPB 

STAT STA


   PRN Reason: Protocol


   Stop: 07/27/18 13:07


   Last Admin: 07/27/18 13:21  Dose: 200 mls/hr





eMAR Start Stop


 Document     07/27/18 13:21  ALEXANDRA  (Rec: 07/27/18 13:22  [facility]


     Intravenous Solution


      Start Date                                 07/27/18


      Start Time                                 13:22


      End Date                                   07/27/18


      End time                                   13:52


      Total Infusion Time                        30





Methylprednisolone (Solu-Medrol)  125 mg IVP STAT STA


   Stop: 07/27/18 12:17


   Last Admin: 07/27/18 12:10  Dose: 125 mg





IVP Administration


 Document     07/27/18 12:10  ALEXANDRA  (Rec: 07/27/18 12:25  [facility]


     Charges for Administration


      # of IVP Administrations                   1














- Scribe Statement





Skylar Martinez





Provider Scribe Attestation:


All medical record entries made by the Scribe were at my direction and 

personally dictated by me. I have reviewed the chart and agree that the record 

accurately reflects my personal performance of the history, physical exam, 

medical decision making, and the department course for this patient. I have 

also personally directed, reviewed, and agree with the discharge instructions 

and disposition.








Disposition/Present on Arrival





- Present on Arrival


Any Indicators Present on Arrival: No


History of DVT/PE: No


History of Uncontrolled Diabetes: No


Urinary Catheter: No


History of Decub. Ulcer: No


History Surgical Site Infection Following: None





- Disposition


Have Diagnosis and Disposition been Completed?: Yes


Diagnosis: 


 Respiratory failure, Renal failure, Elevated troponin, Hypoxia, Congestive 

heart failure, Altered mental status, Elevated d-dimer





Disposition: HOSPITALIZED


Disposition Time: 13:33


Patient Plan: Admission, ICU


Patient Problems: 


 Current Active Problems











Problem Status Onset


 


Altered mental status Acute  


 


Congestive heart failure Acute  


 


Elevated d-dimer Acute  


 


Elevated troponin Acute  


 


Hypoxia Acute  


 


Renal failure Acute  


 


Respiratory failure Acute  











Condition: CRITICAL

## 2018-07-28 LAB
ALBUMIN SERPL-MCNC: 3.6 G/DL (ref 3–4.8)
ALBUMIN/GLOB SERPL: 1.1 {RATIO} (ref 1.1–1.8)
ALT SERPL-CCNC: 58 U/L (ref 7–56)
ARTERIAL BLOOD GAS HEMOGLOBIN: 9.4 G/DL (ref 11.7–17.4)
ARTERIAL BLOOD GAS O2 SAT: 99.2 % (ref 95–98)
ARTERIAL BLOOD GAS PCO2: 46 MM/HG (ref 35–45)
ARTERIAL BLOOD GAS TCO2: 28.6 MMOL.L (ref 22–28)
AST SERPL-CCNC: 64 U/L (ref 14–36)
BASOPHILS # BLD AUTO: 0.01 K/MM3 (ref 0–2)
BASOPHILS NFR BLD: 0.1 % (ref 0–3)
BUN SERPL-MCNC: 28 MG/DL (ref 7–21)
CALCIUM SERPL-MCNC: 8.6 MG/DL (ref 8.4–10.5)
EOSINOPHIL # BLD: 0 10*3/UL (ref 0–0.7)
EOSINOPHIL NFR BLD: 0 % (ref 1.5–5)
ERYTHROCYTE [DISTWIDTH] IN BLOOD BY AUTOMATED COUNT: 16.8 % (ref 11.5–14.5)
GFR NON-AFRICAN AMERICAN: > 60
GRANULOCYTES # BLD: 17.39 10*3/UL (ref 1.4–6.5)
GRANULOCYTES NFR BLD: 92.3 % (ref 50–68)
HCO3 BLDA-SCNC: 27.2 MMOL/L (ref 21–28)
HGB BLD-MCNC: 9.2 G/DL (ref 12–16)
INHALED O2 CONCENTRATION: 60 %
LYMPHOCYTES # BLD: 0.9 10*3/UL (ref 1.2–3.4)
LYMPHOCYTES NFR BLD AUTO: 4.7 % (ref 22–35)
MCH RBC QN AUTO: 27.5 PG (ref 25–35)
MCHC RBC AUTO-ENTMCNC: 31.1 G/DL (ref 31–37)
MCV RBC AUTO: 88.6 FL (ref 80–105)
MONOCYTES # BLD AUTO: 0.6 10*3/UL (ref 0.1–0.6)
MONOCYTES NFR BLD: 2.9 % (ref 1–6)
O2 CAP BLDA-SCNC: 13.1 ML/DL (ref 16–24)
O2 CT BLDA-SCNC: 13 ML/DL (ref 15–23)
PH BLDA: 7.38 [PH] (ref 7.35–7.45)
PLATELET # BLD: 284 10^3/UL (ref 120–450)
PMV BLD AUTO: 9.1 FL (ref 7–11)
PO2 BLDA: 149 MM/HG (ref 80–100)
RBC # BLD AUTO: 3.34 10^6/UL (ref 3.5–6.1)
WBC # BLD AUTO: 18.8 10^3/UL (ref 4.5–11)

## 2018-07-28 RX ADMIN — GUAIFENESIN AND CODEINE PHOSPHATE PRN ML: 100; 10 SOLUTION ORAL at 22:07

## 2018-07-28 RX ADMIN — CEFEPIME SCH MLS/HR: 1 INJECTION, SOLUTION INTRAVENOUS at 18:56

## 2018-07-28 RX ADMIN — IPRATROPIUM BROMIDE AND ALBUTEROL SULFATE PRN ML: .5; 3 SOLUTION RESPIRATORY (INHALATION) at 05:35

## 2018-07-28 RX ADMIN — IPRATROPIUM BROMIDE AND ALBUTEROL SULFATE SCH ML: .5; 3 SOLUTION RESPIRATORY (INHALATION) at 11:30

## 2018-07-28 RX ADMIN — IPRATROPIUM BROMIDE AND ALBUTEROL SULFATE SCH ML: .5; 3 SOLUTION RESPIRATORY (INHALATION) at 15:52

## 2018-07-28 RX ADMIN — GUAIFENESIN SCH MG: 600 TABLET, EXTENDED RELEASE ORAL at 18:51

## 2018-07-28 RX ADMIN — GUAIFENESIN SCH MG: 600 TABLET, EXTENDED RELEASE ORAL at 09:40

## 2018-07-28 RX ADMIN — MORPHINE SULFATE PRN MG: 4 INJECTION, SOLUTION INTRAMUSCULAR; INTRAVENOUS at 10:35

## 2018-07-28 RX ADMIN — GUAIFENESIN SCH MG: 600 TABLET, EXTENDED RELEASE ORAL at 00:19

## 2018-07-28 RX ADMIN — BUDESONIDE SCH MG: 0.25 SUSPENSION RESPIRATORY (INHALATION) at 20:11

## 2018-07-28 RX ADMIN — IPRATROPIUM BROMIDE AND ALBUTEROL SULFATE SCH ML: .5; 3 SOLUTION RESPIRATORY (INHALATION) at 20:10

## 2018-07-28 RX ADMIN — MORPHINE SULFATE PRN MG: 4 INJECTION, SOLUTION INTRAMUSCULAR; INTRAVENOUS at 22:07

## 2018-07-28 RX ADMIN — CEFEPIME SCH MLS/HR: 1 INJECTION, SOLUTION INTRAVENOUS at 05:17

## 2018-07-28 RX ADMIN — VANCOMYCIN HYDROCHLORIDE SCH MLS/HR: 1 INJECTION, POWDER, LYOPHILIZED, FOR SOLUTION INTRAVENOUS at 09:40

## 2018-07-28 RX ADMIN — IPRATROPIUM BROMIDE AND ALBUTEROL SULFATE PRN ML: .5; 3 SOLUTION RESPIRATORY (INHALATION) at 07:54

## 2018-07-28 RX ADMIN — IPRATROPIUM BROMIDE AND ALBUTEROL SULFATE PRN ML: .5; 3 SOLUTION RESPIRATORY (INHALATION) at 00:10

## 2018-07-28 NOTE — HP
DATE OF EXAM:  07/27/2018



LOCATION:  The patient was seen in the emergency room.



REASON FOR ADMISSION:  Acute respiratory distress.



HISTORY OF PRESENT ILLNESS:  This is a 47-year-old female with history of

asthma, depression, anxiety, hypertension who was recently discharged 3-4

days ago with asthma exacerbation and she was doing fine.  According to

her, she got her medicine as prescribed; however, she started getting worse

in the last couple of days and she came to the emergency room complaining

of coughing, wheezing, short of breath.  No chest pain, no nausea, no

vomiting, no dizziness, no fever, no chills.  The patient was discharged on

Medrol Dosepak and bronchodilators, Ativan.  She does use oxycodone for her

chronic back pain and hip arthritis and was given cough medicine and she

finished a course of Zithromax.  However, the patient came in with similar

symptoms of respiratory distress.



PAST MEDICAL HISTORY:

1.  As I mentioned, asthma, history of arthritis of back, hips and knees,

she was seen by pain management as outpatient.

2.  Anxiety, seen by psychiatrist, Dr. Jacklyn Higuera.  She was given

Ativan 0.5 b.i.d.

3.  Severe asthma.

4.  Mild depression.

5.  Hypertension.



HOME MEDICATIONS:  According to her, she takes oxycodone at home. 

Presently, she did have no prescription given.  She takes also Dyazide,

Protonix, Singulair, Medrol Dosepak, Cozaar 100, Ativan 0.5 b.i.d.,

Claritin 10 mg once a day, Advair 50/250 b.i.d., Tenormin 25 b.i.d. and

Elavil 25 at bedtime.



ALLERGIES:  NO KNOWN ALLERGIES.



SOCIAL HISTORY:  She lives by herself now.  She lost her  recently a

few weeks ago and she has a supportive family, sisters and brothers.



REVIEW OF SYSTEMS:  According to the present illness, she does have dyspnea

and wheezing, which she has never been intubated and no severe asthma in

the past.  She does not use inhalers on a regular basis.  Hip arthritis,

chronic pain.



PHYSICAL EXAMINATION:

GENERAL:  The patient was seen in the emergency room on BiPAP machine at

10:05 and she seems doing okay.  She was alert and awake.  She responds

properly to questions and moving all extremities.

VITAL SIGNS:  Temperature 98.2, heart rate 87, respirations 12 on BiPAP

machine, saturating 100%.

HEAD AND NECK:  Normal.  No JVD.  No thyromegaly.

CHEST:  Bilateral wheeze.

CARDIAC:  First sound and second sound normal.

ABDOMEN:  Soft, obese, nontender.

EXTREMITIES:  No edema.

NEUROLOGIC:  Normal.



LABORATORY DATA:  Chest x-ray shows congestion.  Laboratory studies show

white count 26.4, hemoglobin 10.1, hematocrit 31.3, and platelets 353. 

Chemistry shows sodium 140, potassium 4.3, chloride 100, bicarbonate 25,

BUN is 7, creatinine 1.7, her sugar 138, calcium 8.5, magnesium 2.3.  She

also has liver function test which is elevated slightly.  Her AST 68, ALT

65, alkaline phosphatase 128, lactate dehydrogenase is 1725, total CK 1563,

CK-MB is normal.  Troponin is slightly elevated at 0.08.  ProBNP 2320.



IMPRESSION AND PLAN:

1.  Acute respiratory failure, possibility of congestive heart failure

should be considered, also asthma exacerbation is the main reason.  We will

admit the patient.  We will give her IV steroids, inhaled bronchodilators,

BiPAP machine.  We will get back to Dr. Pena to follow up on that and we

will give her sedation.  IV antibiotic was given.  Consult with Dr. Sheldon Moss to evaluate her.  Currently, she received _____ and doxycycline plus

meropenem.  Also, steroid-induced leukocytosis should be considered also.

2.  Sepsis, could be due to lung etiology, pulmonary etiology.  Continue

current therapy as per ID consults.

3.  Positive troponin.  The patient had a normal stress test and good left

ventricular function in echo.  We will get Dr. Grover, Cardiology consultant,

to see the patient and we will monitor her regularly.  She is on

penicillin.  Continue heparin.  Monitor her laboratory study and troponin

in the morning.

4.  Depression, anxiety, chronic pain syndrome, hypertension, acute renal

failure.  We will follow up on that.  Repeat labs in the morning.



The patient will be admitted to the unit.







__________________________________________

Aries Van MD



DD:  07/28/2018 8:16:50

DT:  07/28/2018 13:04:20

[facility] # [id number]

## 2018-07-28 NOTE — PN
DATE:  07/28/2018



SUBJECTIVE:  The patient is in bed, in no acute distress.  She is awake. 

She had an uneventful night.  She did have a low-grade fevers yesterday.



PHYSICAL EXAMINATION:

VITAL SIGNS:  Temperature is 98, T-max for 24 hours is 100.6, respiratory

rate of 18, heart rate of 85.

HEENT:  Examination of HEENT is unremarkable.

NECK:  Supple.

LUNGS:  Have decreased breath sounds.

HEART:  Normal S1, S2.

ABDOMEN:  Soft, nontender.  No rebound or guarding.



LABORATORY DATA:  Laboratory examination reveals a white count of 18,000,

hemoglobin is 9, platelets of 284.  BUN of 28, creatinine of 0.8. 

Urinalysis is noted.  Urine for Legionella antigen is negative.



ASSESSMENT AND PLAN:  A 47-year-old female with obesity with body mass

index of 38, anxiety, bipolar disorder, asthma, hypertension, who is

admitted with systemic inflammatory response syndrome with acute renal

failure and possible severe sepsis with healthcare-associated possible

gram-positive cocci, possible gram-negative candelario pneumonia.  Urinary antigen

is negative.  Her renal function is resolved being the creatinine today is

down to 0.8.  Awaiting for a procalcitonin and culture results.  Currently

on the doxycycline, cefepime and vancomycin.  We will make further

recommendations.





__________________________________________

Zeeshan Mcleod MD





DD:  07/28/2018 7:53:21

DT:  07/28/2018 7:55:34

[facility] # [id number]

## 2018-07-28 NOTE — CP.PCM.PN
Subjective





- Date & Time of Evaluation


Date of Evaluation: 07/28/18


Time of Evaluation: 07:30





- Subjective


Subjective: 





Patient seen and examined on rounds, reports to be doing well. OFF BIPAP, on 

2LNC, sat 100%


Complaining of cough. 





Objective





- Vital Signs/Intake and Output


Vital Signs (last 24 hours): 


 











Temp Pulse Resp BP Pulse Ox


 


 9 F L  86   19   161/84 H  92 L


 


 07/28/18 04:00  07/28/18 06:00  07/28/18 06:00  07/28/18 06:00  07/28/18 06:00








Intake and Output: 


 











 07/28/18 07/28/18





 06:59 18:59


 


Intake Total 1350 


 


Output Total 800 


 


Balance 550 














- Medications


Medications: 


 Current Medications





Albuterol/Ipratropium (Duoneb 3 Mg/0.5 Mg (3 Ml) Ud)  3 ml IH Q2H PRN


   PRN Reason: Shortness of Breath


   Last Admin: 07/28/18 07:54 Dose:  3 ml


Albuterol/Ipratropium (Duoneb 3 Mg/0.5 Mg (3 Ml) Ud)  3 ml IH [id number] JEF


Arformoterol Tartrate (Brovana)  15 mcg IH [id number] JEF


Benzonatate (Tessalon Perles)  100 mg PO TID JEF


Budesonide (Pulmicort Respules)  0.25 mg IH [id number] JEF


Guaifenesin (Mucinex La)  600 mg PO BID [facility]


   Last Admin: 07/28/18 00:19 Dose:  600 mg


Guaifenesin/Codeine Phosphate (Robitussin W/Codeine)  5 ml PO Q6H PRN


   PRN Reason: Cough and congestion


Vancomycin HCl (Vancomycin 1gm)  1 gm in 250 mls @ 167 mls/hr IVPB DAILY JEF


   PRN Reason: Protocol


   Last Admin: 07/27/18 14:20 Dose:  167 mls/hr


Doxycycline Hyclate 100 mg/ (Sodium Chloride)  100 mls @ 100 mls/hr IVPB Q12 JEF


   PRN Reason: Protocol


   Last Admin: 07/27/18 21:44 Dose:  100 mls/hr


Sodium Chloride (Sodium Chloride 0.9%)  1,000 mls @ 75 mls/hr IV .[id number] [facility]


   Last Admin: 07/27/18 17:16 Dose:  75 mls/hr


Cefepime HCl (Maxipime 2gm)  2 gm in 100 mls @ 100 mls/hr IVPB 0600,1800 JEF


   PRN Reason: Protocol


   Stop: 08/01/18 15:01


   Last Admin: 07/28/18 05:17 Dose:  100 mls/hr


Lorazepam (Ativan)  0.5 mg IVP Q6H PRN; Protocol


   PRN Reason: Anxiety











- Labs


Labs: 


 





 07/28/18 05:00 





 07/28/18 05:00 





 











PT  12.2 SECONDS (9.4-12.5)   07/27/18  12:15    


 


INR  1.06  (0.93-1.08)   07/27/18  12:15    


 


APTT  26.4 Seconds (25.1-36.5)   07/27/18  12:15    














- Constitutional


Appears: Non-toxic, No Acute Distress





- Head Exam


Head Exam: NORMAL INSPECTION





- Eye Exam


Eye Exam: Normal appearance





- ENT Exam


ENT Exam: Mucous Membranes Moist





- Respiratory Exam


Respiratory Exam: Wheezes, NORMAL BREATHING PATTERN





- Cardiovascular Exam


Cardiovascular Exam: REGULAR RHYTHM, +S1, +S2





- GI/Abdominal Exam


GI & Abdominal Exam: Soft, Normal Bowel Sounds





- Extremities Exam


Extremities Exam: Full ROM, Normal Inspection





- Back Exam


Back Exam: NORMAL INSPECTION





- Neurological Exam


Neurological Exam: Alert, Awake, Oriented x3





- Psychiatric Exam


Psychiatric exam: Normal Mood





- Skin


Skin Exam: Normal Color, Warm





Assessment and Plan





- Assessment and Plan (Free Text)


Assessment: 





Patient is 46yo female with PMHx of morbid obesity, asthma, CALVIN, bipolar 

disorder, presented with SOB.


Patient was placed on BIPAP amd given IV steroids, with improvement in SOB. 

Currently OFF BIPAP, doing well. 


Currently afebrile, BP stable, sat 100% on 2LNC


Renal function markedly improved, on IVF


STABLE





Resp failure, resolved


COPD/Asthma exacerbation


SOB


HTN


Renal failure


Leukocytosis





Recommend:


- cont with supp o2 as needed


- Duonebs PRN


- IV steroids Solumedrol


- Broad spectrum abx, as per ID


- Panculture, UCx, BCx, procal


- ECHO


- Codeine for cough


- ID follow up


- renal sono


- GI ppx


- DVT ppx


- transfer to med surg

## 2018-07-29 LAB
ALBUMIN SERPL-MCNC: 4 G/DL (ref 3–4.8)
ALBUMIN/GLOB SERPL: 1.1 {RATIO} (ref 1.1–1.8)
ALT SERPL-CCNC: 69 U/L (ref 7–56)
AST SERPL-CCNC: 47 U/L (ref 14–36)
BASOPHILS # BLD AUTO: 0 K/MM3 (ref 0–2)
BASOPHILS NFR BLD: 0 % (ref 0–3)
BUN SERPL-MCNC: 27 MG/DL (ref 7–21)
CALCIUM SERPL-MCNC: 9.3 MG/DL (ref 8.4–10.5)
EOSINOPHIL # BLD: 0 10*3/UL (ref 0–0.7)
EOSINOPHIL NFR BLD: 0 % (ref 1.5–5)
ERYTHROCYTE [DISTWIDTH] IN BLOOD BY AUTOMATED COUNT: 16.8 % (ref 11.5–14.5)
GFR NON-AFRICAN AMERICAN: > 60
GRANULOCYTES # BLD: 13.54 10*3/UL (ref 1.4–6.5)
GRANULOCYTES NFR BLD: 92.4 % (ref 50–68)
HGB BLD-MCNC: 10.5 G/DL (ref 12–16)
LYMPHOCYTES # BLD: 1 10*3/UL (ref 1.2–3.4)
LYMPHOCYTES NFR BLD AUTO: 6.6 % (ref 22–35)
MCH RBC QN AUTO: 27.9 PG (ref 25–35)
MCHC RBC AUTO-ENTMCNC: 31.6 G/DL (ref 31–37)
MCV RBC AUTO: 88.3 FL (ref 80–105)
MONOCYTES # BLD AUTO: 0.1 10*3/UL (ref 0.1–0.6)
MONOCYTES NFR BLD: 1 % (ref 1–6)
PLATELET # BLD: 360 10^3/UL (ref 120–450)
PMV BLD AUTO: 8.9 FL (ref 7–11)
RBC # BLD AUTO: 3.76 10^6/UL (ref 3.5–6.1)
WBC # BLD AUTO: 14.6 10^3/UL (ref 4.5–11)

## 2018-07-29 RX ADMIN — VANCOMYCIN HYDROCHLORIDE SCH MLS/HR: 1 INJECTION, POWDER, LYOPHILIZED, FOR SOLUTION INTRAVENOUS at 10:44

## 2018-07-29 RX ADMIN — IPRATROPIUM BROMIDE AND ALBUTEROL SULFATE SCH: .5; 3 SOLUTION RESPIRATORY (INHALATION) at 04:32

## 2018-07-29 RX ADMIN — IPRATROPIUM BROMIDE AND ALBUTEROL SULFATE SCH ML: .5; 3 SOLUTION RESPIRATORY (INHALATION) at 12:20

## 2018-07-29 RX ADMIN — OXYCODONE HYDROCHLORIDE PRN MG: 10 TABLET ORAL at 20:51

## 2018-07-29 RX ADMIN — IPRATROPIUM BROMIDE AND ALBUTEROL SULFATE SCH ML: .5; 3 SOLUTION RESPIRATORY (INHALATION) at 00:12

## 2018-07-29 RX ADMIN — GUAIFENESIN AND CODEINE PHOSPHATE PRN ML: 100; 10 SOLUTION ORAL at 20:51

## 2018-07-29 RX ADMIN — IPRATROPIUM BROMIDE AND ALBUTEROL SULFATE SCH ML: .5; 3 SOLUTION RESPIRATORY (INHALATION) at 23:48

## 2018-07-29 RX ADMIN — CEFEPIME SCH MLS/HR: 1 INJECTION, SOLUTION INTRAVENOUS at 05:14

## 2018-07-29 RX ADMIN — BUDESONIDE SCH MG: 0.25 SUSPENSION RESPIRATORY (INHALATION) at 20:07

## 2018-07-29 RX ADMIN — IPRATROPIUM BROMIDE AND ALBUTEROL SULFATE SCH: .5; 3 SOLUTION RESPIRATORY (INHALATION) at 07:48

## 2018-07-29 RX ADMIN — METHYLPREDNISOLONE SODIUM SUCCINATE SCH MG: 40 INJECTION, POWDER, FOR SOLUTION INTRAMUSCULAR; INTRAVENOUS at 22:45

## 2018-07-29 RX ADMIN — OXYCODONE HYDROCHLORIDE PRN MG: 10 TABLET ORAL at 13:50

## 2018-07-29 RX ADMIN — IPRATROPIUM BROMIDE AND ALBUTEROL SULFATE SCH ML: .5; 3 SOLUTION RESPIRATORY (INHALATION) at 20:07

## 2018-07-29 RX ADMIN — VANCOMYCIN HYDROCHLORIDE SCH MLS/HR: 1 INJECTION, POWDER, LYOPHILIZED, FOR SOLUTION INTRAVENOUS at 23:00

## 2018-07-29 RX ADMIN — IPRATROPIUM BROMIDE AND ALBUTEROL SULFATE SCH ML: .5; 3 SOLUTION RESPIRATORY (INHALATION) at 15:45

## 2018-07-29 RX ADMIN — GUAIFENESIN SCH MG: 600 TABLET, EXTENDED RELEASE ORAL at 17:41

## 2018-07-29 RX ADMIN — GUAIFENESIN SCH MG: 600 TABLET, EXTENDED RELEASE ORAL at 10:46

## 2018-07-29 RX ADMIN — CEFEPIME SCH MLS/HR: 1 INJECTION, SOLUTION INTRAVENOUS at 17:59

## 2018-07-29 RX ADMIN — ENOXAPARIN SODIUM SCH MG: 40 INJECTION SUBCUTANEOUS at 10:46

## 2018-07-29 RX ADMIN — BUDESONIDE SCH: 0.25 SUSPENSION RESPIRATORY (INHALATION) at 07:49

## 2018-07-29 NOTE — PN
DATE:  07/29/2018



SUBJECTIVE:  The patient is feeling more and much better, less congested

and wheezing and comfortable.  She is sitting on the edge of the bed.  No

respiratory distress.



PHYSICAL EXAMINATION:  Today,

VITAL SIGNS:  Blood pressure 167/96, respirations 18, heart rate 59,

temperature 98.1 and saturation 98.

HEAD AND NECK:  Normal.  No JVD.  No thyromegaly.

CHEST:  Bilaterally clear.

CARDIAC:  First sound and second sound normal.

ABDOMEN:  Soft, nontender.  There is no tenderness.

EXTREMITIES:  No edema.

NEUROLOGIC:  Normal.



LABORATORY STUDY:  White count 14.6, it is better; hemoglobin 10.5;

hematocrit 33.2; platelets 360.  Chemistry:  Sodium 145, potassium 4.1,

chloride 104, bicarb 25, BUN 27, creatinine 0.7, blood sugar 145, calcium

9.3, phosphorus 2.2.  AST elevated at 47; alk phos 146, elevated and ALT

elevated at 69.  It is less than _____ than admission.



IMPRESSION:

1.  Acute asthma exacerbation.  Continue current therapy.  IV and inhaled

bronchodilators.  Mucomyst.  Chest physical therapy.  IV antibiotics.  The

patient is getting vancomycin and meropenem, Maxipime 2 g IV every 8.

2.  Hypertension.  Still uncontrolled.  Renal consult on the case.  He

increased the hydralazine 50 b.i.d.  Continue Cozaar 100.  Continue

hydrochlorothiazide and continue atenolol.

3.  Acute renal failure, improved.  Renal function is normal.

4.  Chronic anxiety, chronic osteoarthritis, continue Percocet, continue

Ativan p.r.n.  We will follow up clinically.

5.  Abnormal liver functions test.  Etiology unclear.  Could be

medications.  Could be congestion.  We will get ultrasound of the liver

with GIDr. Felder to evaluate.





__________________________________________

Aries Van MD





DD:  07/29/2018 17:39:04

DT:  07/29/2018 18:29:18

[facility] # [id number]

## 2018-07-29 NOTE — PN
DATE:  07/29/2018



SUBJECTIVE:  The patient is in bed, was seen early this morning, in no

acute distress, nontoxic.



PHYSICAL EXAMINATION:

VITAL SIGNS:  Temperature of 98, blood pressure is 160/90, respiratory rate

of 18, heart rate of 59.

HEENT:  Examination of HEENT is unremarkable.

NECK:  Supple.

LUNGS:  Have decreased breath sounds.

HEART:  Normal S1, S2.

ABDOMEN:  Soft, nontender.



LABORATORY DATA:  Laboratory examination reveals a white count of 18,800,

hemoglobin of 9, platelets of 284.  BUN of 28, creatinine is 0.8, which is

improved from her creatinine of 1.7.  LFTs have somewhat improved and the

procalcitonin is 9.59.  Troponin is negative x2.  Urinalysis is noted and

urine for Legionella antigen is negative.  The patient's blood culture from

07/27/2018 is positive for Staph aureus, one bottle.  Review of orders

reveals the patient to be on doxycycline.  The patient is also on cefepime

and Solu-Medrol and vancomycin.  There nares MRSA is not detected.  The

sputum culture is normal david.  Dr. Pena's consultation from yesterday

is reviewed.  He states the patient has chronic obstructive lung disease

and may need sleep _____.  Dr. Hernandez's consultation is reviewed, which

says no evidence of acute MI.  Dr. Santos Castro's progress note is

reviewed.  The patient's renal ultrasound is reviewed.  Unremarkable.  The

chest x-ray is also reviewed.  Moderate vascular and interstitial

congestion.



ASSESSMENT AND PLAN:  A 47-year-old female as stated with severe sepsis;

acute kidney injury; Staphylococcus aureus bacteremia, unknown etiology;

healthcare-associated pneumonia with elevated procalcitonin, on vancomycin,

cefepime, doxycycline.  Day #3 of cefepime.  We will discontinue the

doxycycline.  The patient's urine for Legionella antigen is negative.  We

will continue the vancomycin since the renal function has improved.  We

will readjust the vancomycin to b.i.d. instead of once daily.  Repeat the

blood cultures.  Check on echocardiogram and sedimentation rate and

C-reactive protein.  The patient has no intravascular devices as my

prolonged discussion with the patient.  We will make further

recommendations.  The patient is on Solu-Medrol.  We will need a vancomycin

trough level, the fourth dose.  We will follow with you.





__________________________________________

Zeeshan Mcleod MD





DD:  07/29/2018 9:38:45

DT:  07/29/2018 9:44:01

[facility] # [id number]

## 2018-07-29 NOTE — PN
DATE:  07/29/2018



PULMONARY PROGRESS NOTE



REFERRING PHYSICIAN:  Aries Van MD.



SUBJECTIVE:  She is lying in the bed, head at 45 degrees.  Night was

unremarkable.  Feels much better.  Cough and wheezing are better.  No

nausea.  No vomiting, diarrhea, leg pain, leg swelling.



OBJECTIVE:

GENERAL:  In no acute distress.

VITAL SIGNS:  Temperature is 98, heart rate is 59, respiratory rate is 18,

blood pressure 167/96, pulse ox 98% on 3 L nasal cannula.

HEENT:  Moist mucous membrane.  Crowded airway.  Mallampati score is 4.

NECK:  Supple.  No JVD.

LUNGS:  Have a prolonged expiratory phase with some wheezing, but much

better airflow than yesterday.

HEART:  S1 and S2.

ABDOMEN:  Soft, nontender.  No organomegaly.

EXTREMITIES:  No edema.

NEUROLOGICAL:  Awake and alert.  Follows simple command.



MEDICATIONS:  She is on Ambien 10 mg at bedtime p.r.n., hydralazine 50 mg

every 12 hours, lorazepam 0.5 mg every 6 hours p.r.n., Catapres 0.1 mg

every 4 hours p.r.n., Cozaar 100 mg daily, DuoNeb every 2 hours p.r.n. and

every 4 hours round-the-clock, Lovenox 40 mg daily, cefepime 2 g IV every

12 hours, hydrochlorothiazide 12.5 mg daily, morphine 4 mg every 4 hours

p.r.n., Mucinex 600 mg twice a day, Norvasc 5 mg daily, oxycodone immediate

release 10 mg twice a day p.r.n., Pulmicort inhaled twice a day, Robitussin

with Codeine every 6 hours p.r.n., IV fluid normal saline 75 mL/hour,

Solu-Medrol 40 mg every 8 hours, Tenormin is 25 mg twice a day, Tessalon

Perles 100 mg three times a day, vancomycin 1 g IV every 12 hours.



LABORATORY DATA:  Shows hemoglobin 10.5, hematocrit 33.2, WBC 14.6,

platelet is 360.  Blood gases show pH 7.38, pCO2 of 46, O2 of 149 that is

on 60% oxygen.  Sodium 145, potassium 4.1, chloride 104, bicarbonate 25,

BUN 27, creatinine 0.7, glucose 145, calcium 9.3, phosphorus 2.2, AST 47,

ALT 69, alk phos is 146, albumin is 4.



IMPRESSION AND PLAN:  Exacerbation of chronic obstructive lung disease,

bipolar disorder, hypertension, renal insufficiency.  Pulmonary point of

view, she is doing well.  We will decrease Solu-Medrol to 40 every 12

hours.  Continue antibiotics, inhaled bronchodilator.  Continue IV fluid. 

Gastric prophylaxis.  Deep venous thrombosis prophylaxis.  Out of bed to

chair.  I had long discussion with the patient about her chronic

obstructive lung disease relation to environmental toxin including Clorox

and mold exposure.  The patient's home has some leakage in the bathroom. 

There was a lot of mold which she tried to clean up with the Clorox.  She

expressed understanding.



Thank you and we will follow with you.





__________________________________________

Yvette Pena MD





DD:  07/29/2018 13:40:23

DT:  07/29/2018 13:42:47

Job # [id number]

## 2018-07-29 NOTE — CON
DATE:  07/28/2018



PULMONARY CONSULTATION



REFERRING PHYSICIAN:  Aries Van MD.



REASON FOR CONSULT:  Asthma, short of breath and wheezing.



HISTORY OF PRESENT ILLNESS:  This is a 47-year-old female, known to me,

just recently discharged after asthma exacerbation, has a history of

anxiety, asthma, bipolar disorder.  According to the patient, she was

cleaning her bathroom with Clorox, got increased cough and shortness of

breath, presented to emergency room with diffuse wheezing, given steroids,

antibiotics.  Seen by intensivist and admitted.  Presently lying in the

bed.  Has a cough, shortness of breath and wheezing.  No hemoptysis.  No

chest pain.  No nausea.  No vomiting, diarrhea, leg pain, leg swelling.



PAST MEDICAL HISTORY:  Asthma, anxiety disorder, bipolar disorder,

hypertension.



SOCIAL HISTORY:  Denies any smoking or alcohol use.



ALLERGIES:  NONE KNOWN.



FAMILY HISTORY:  Positive for coronary artery disease.



MEDICATIONS:  She is on Ativan 0.5 mg every 6 hours p.r.n., doxycycline 100

mg twice a day, DuoNeb every 2 hours p.r.n. and every 4 hours

round-the-clock, cefepime 2 g IV every 12 hours, morphine 4 mg every 4

hours p.r.n., Mucinex  mg twice a day, Pulmicort inhaled twice a day,

Robitussin with Codeine 5 mL every 6 p.r.n., IV fluid normal saline 75

mL/hour, Tessalon Perles three times a day, vancomycin 1 g IV daily.



REVIEW OF SYSTEMS:  No headache, no rhinitis.  Has cough, shortness of

breath, wheezing.  No chest pain.  No nausea.  No vomiting, diarrhea, leg

pain, leg swelling.



PHYSICAL EXAMINATION:

GENERAL:  Lying in the bed with mild cough and shortness of breath.

VITAL SIGNS:  Temperature is 98, heart rate is 90, respiratory rate is 20,

blood pressure 161/84, pulse ox 100% on nasal cannula.

HEENT:  Moist mucous membrane.  Crowded airway.  Mallampati score is 4.

NECK:  Supple.  No JVD.

LUNGS:  Have prolonged expiratory phase with wheezing and rhonchi.

HEART:  S1 and S2.

ABDOMEN:  Soft, nontender, no organomegaly.

EXTREMITIES:  No edema.

NEUROLOGICAL:  Awake and alert.  Follows simple command.



LABORATORY DATA:  Shows hemoglobin 9.2, hematocrit 29.6, WBC 18.8, platelet

count is 284.  INR 1.06.  Blood gases this morning showed pH 7.38, pCO2 of

46, O2 of 149 that is on supplemental oxygen.  Sodium 146, potassium 4.5,

chloride 104, bicarbonate 31, BUN 28, creatinine 0.8, glucose 107, calcium

8.6, magnesium 2.6, AST 64, ALT 58, alk phos is 106.  Troponin less than

0.04, albumin is 3.6, procalcitonin 9.59.  Drug screen shows opiates and

benzodiazepine are positive.  Microbiology:  Blood culture one out of two

has a gram-positive cocci.  Sputum culture, there is no growth.  V/Q scan

is low probability of pulmonary embolism.  Chest x-ray done in the ER shows

vascular congestion.



IMPRESSION AND PLAN:  Exacerbation of chronic obstructive lung disease,

bipolar disorder, hypertension, renal insufficiency.  Agree with the

present treatment.  Also has one blood culture out of two positive

gram-positive cocci.  Seen by Infectious Diseases.  We will add Solu-Medrol

40 every 8 hours.  Continue inhaled bronchodilators.  Repeat chest x-ray. 

Last admission had an echocardiogram, which was normal.  Stress test was

unremarkable.  May need sleep study upon discharge as outpatient.



Thank you and we will follow with you.





__________________________________________

Yvette Pena MD





DD:  07/28/2018 16:07:38

DT:  07/28/2018 16:10:26

Job # [id number]

## 2018-07-29 NOTE — CP.PCM.PN
Subjective





- Date & Time of Evaluation


Date of Evaluation: 07/29/18


Time of Evaluation: 07:00





- Subjective


Subjective: 





sleeping but easily awaken, no distress, denies shortness of breath





Reason for consultation and follow up: Cardiac evaluation for shortness of 

breath, history of hypertension, asthma





Seen and examined by me and Dr. Hernandez





Objective





- Vital Signs/Intake and Output


Vital Signs (last 24 hours): 


 











Temp Pulse Resp BP Pulse Ox


 


 98.1 F   59 L  18   167/96 H  98 


 


 07/29/18 08:02  07/29/18 08:02  07/29/18 08:02  07/29/18 08:02  07/29/18 08:02








Intake and Output: 


 











 07/29/18 07/29/18





 06:59 18:59


 


Intake Total 740 


 


Output Total 450 


 


Balance 290 














- Medications


Medications: 


 Current Medications





Albuterol/Ipratropium (Duoneb 3 Mg/0.5 Mg (3 Ml) Ud)  3 ml IH Q2H PRN


   PRN Reason: Shortness of Breath


   Last Admin: 07/28/18 07:54 Dose:  3 ml


Albuterol/Ipratropium (Duoneb 3 Mg/0.5 Mg (3 Ml) Ud)  3 ml IH [id number] [facility]


   Last Admin: 07/29/18 07:48 Dose:  Not Given


Amlodipine Besylate (Norvasc)  5 mg PO DAILY [facility]


Atenolol (Tenormin)  25 mg PO BID [facility]


   Last Admin: 07/28/18 18:52 Dose:  25 mg


Benzonatate (Tessalon Perles)  100 mg PO TID [facility]


   Last Admin: 07/28/18 18:52 Dose:  100 mg


Budesonide (Pulmicort Respules)  0.25 mg IH [id number] [facility]


   Last Admin: 07/29/18 07:49 Dose:  Not Given


Clonidine HCl (Catapres)  0.1 mg PO Q4 PRN


   PRN Reason: Systolic Blood Pressure


   Last Admin: 07/29/18 05:14 Dose:  0.1 mg


Enoxaparin Sodium (Lovenox)  40 mg SC DAILY [facility]


   PRN Reason: Protocol


Guaifenesin (Mucinex La)  600 mg PO BID [facility]


   Last Admin: 07/28/18 18:51 Dose:  600 mg


Guaifenesin/Codeine Phosphate (Robitussin W/Codeine)  5 ml PO Q6H PRN


   PRN Reason: Cough and congestion


   Last Admin: 07/28/18 22:07 Dose:  5 ml


Hydralazine HCl (Apresoline)  10 mg PO QID [facility]


   Last Admin: 07/28/18 22:07 Dose:  10 mg


Hydrochlorothiazide (Microzide)  12.5 mg PO DAILY [facility]


Sodium Chloride (Sodium Chloride 0.9%)  1,000 mls @ 75 mls/hr IV .[id number] [facility]


   Last Admin: 07/28/18 22:11 Dose:  75 mls/hr


Cefepime HCl (Maxipime 2gm)  2 gm in 100 mls @ 100 mls/hr IVPB 0600,1800 [facility]


   PRN Reason: Protocol


   Stop: 08/01/18 15:01


   Last Admin: 07/29/18 05:14 Dose:  100 mls/hr


Vancomycin HCl (Vancomycin 1gm)  1 gm in 250 mls @ 167 mls/hr IVPB Q12H [facility]


   PRN Reason: Protocol


   Stop: 08/07/18 09:46


Lorazepam (Ativan)  0.5 mg IVP Q6H PRN; Protocol


   PRN Reason: Anxiety


Losartan Potassium (Cozaar)  100 mg PO DAILY [facility]


Methylprednisolone (Solu-Medrol)  40 mg IVP Q8 [facility]


   Last Admin: 07/29/18 05:14 Dose:  40 mg


Morphine Sulfate (Morphine)  4 mg IVP Q4H PRN


   PRN Reason: Pain, moderate (4-7)


   Last Admin: 07/28/18 22:07 Dose:  4 mg


Oxycodone HCl (Oxycodone Immediate Release Tab)  10 mg PO BID PRN


   PRN Reason: Pain, severe (8-10)


Zolpidem Tartrate (Ambien)  10 mg PO HS PRN; Protocol


   PRN Reason: Insomnia


   Last Admin: 07/29/18 02:43 Dose:  10 mg











- Labs


Labs: 


 





 07/28/18 05:00 





 07/28/18 05:00 





 











PT  12.2 SECONDS (9.4-12.5)   07/27/18  12:15    


 


INR  1.06  (0.93-1.08)   07/27/18  12:15    


 


APTT  26.4 Seconds (25.1-36.5)   07/27/18  12:15    














- Constitutional


Appears: No Acute Distress





- Eye Exam


Eye Exam: Normal appearance





- ENT Exam


ENT Exam: Mucous Membranes Moist





- Respiratory Exam


Respiratory Exam: Decreased Breath Sounds, NORMAL BREATHING PATTERN


Additional comments: 





nasal cannula





- Cardiovascular Exam


Cardiovascular Exam: +S1, +S2





- GI/Abdominal Exam


GI & Abdominal Exam: Soft, Normal Bowel Sounds





- Extremities Exam


Extremities Exam: Full ROM


Additional comments: 





1-2+edema





- Neurological Exam


Neurological Exam: Alert, Awake, Oriented x3





- Psychiatric Exam


Psychiatric exam: Normal Affect





- Skin


Skin Exam: Dry, Warm





Assessment and Plan





- Assessment and Plan (Free Text)


Assessment: 





A 47 year old female who came in to the ER due to shortness of breath. History 

of hypertension and asthma. Non compliant with medicationsObese. Seen by 

Psychiatry for anxiety and depression. ID on consult. D-dimer elevated, VQ scan 

done low probability of pulmonary embolism.Renal ultrasound done-unremarkable 

findings.  Exacerbation of asthma.urine positive for opiates and benzodiapines.


Plan: 





ECHO to evaluate LV function


Continue IV for hydration


Watch for drug withdrawal symptoms


Uncontrolled blood pressure


On Norvasc 5 mg daily,Tenormin 25 mg daily,Catapres 0.1 mg PRN,


 Lovenox 40 mg daily,Apresoline 10 mg PO QID, Microzide  12.5 mg daily


 Cozaar  100 mg PO 


Will increase Apresoline dose


Continue antibiotics per ID


Pulmonary  on consult


Continue current treatment


Continue current medications





Will follow up





Plan and treatment discussed with Dr. Hernandez

## 2018-07-29 NOTE — CON
DATE:  2018



LOCATION:  The patient is in , bed 7.



REASON FOR CONSULTATION:  Shortness of breath, cardiac evaluation, history

of hypertension, history of asthma.



HISTORY OF PRESENT ILLNESS:  A 47-year-old female, known case of asthma and

hypertension, was admitted to the hospital with shortness of breath. 

Patient denies any chest pain, palpitation at that time.  Patient is a

known case of hypertension about 13 years and asthma about 25 years. 

Patient has poor compliance.  Patient in the past had stopped medications

on her own.  Patient's breathing has now improved.



PAST MEDICAL HISTORY:  As mentioned, patient has hypertension 13 years and

asthma 25 years, sometimes she gets swelling on the legs, which was

relieved by when she sleeps.



PERSONAL HISTORY:  She smoked in childhood, only occasionally one or so

cigarettes off and on.  No drinking.



ALLERGIES : PATIENT DENIES ANY ALLERGIES.



FAMILY HISTORY:  Grandfather  at young age with coronary artery

disease, one cousin also has coronary artery disease at young age.



HOME MEDICATIONS:  Triamterene/hydrochlorothiazide 37.5/25 mg daily,

Protonix 40 daily, Singulair 10 daily.  She recently started taking Medrol

Dosepak, Cozaar 100 mg daily, Ativan 0.5 b.i.d. p.r.n., Advair 250/50

Diskus, atenolol 25 daily, Elavil 25 at bedtime.



REVIEW OF SYSTEMS:  All the systems reviewed.  Positive as mentioned in the

history, others are negative.



PHYSICAL EXAMINATION:

VITAL SIGNS:  Blood pressure 133/70, respirations 24, pulse 85, patient is

afebrile.

HEENT:  Head is normocephalic.  Eyes:  Pupils normal.  Conjunctivae

slightly pale.

NECK:  JVP low.  Carotids equal.

THORAX:  AP diameter normal.

LUNGS:  Few rales and has wheezing sounds.

CARDIOVASCULAR:  S1 and S2.

ABDOMEN:  Soft, nontender.  No organomegaly.

EXTREMITIES:  No clubbing.  No cyanosis.



LABORATORY DATA:  WBC 18.8, hemoglobin 9.2, hematocrit 29.6, platelet 284,

yesterday WBC count was 26.4, granulocytes 92.3.  Sodium 146, potassium

4.5, BUN 28, creatinine 0.8, yesterday BUN was 57 and creatinine 1.7,

magnesium 2.6, calcium 8.6.  AST 64, ALT 58, troponin x2, first one was

0.08, second one was 0.04, they are indeterminate range.  Total protein and

albumin normal.  NT-pro-B natriuretic peptide yesterday 2320.



IMAGING DATA:  Chest x-ray done by radiologist, vascular congestion.  I

reviewed the x-ray, looks like infiltrate.



DIAGNOSTIC DATA:  EKG shows sinus tachycardia 108 per minute, possible left

atrial enlargement, poor progression of R, V1 to V3.



DIAGNOSES:  Acute shortness of breath with pneumonia with sepsis,

hypertension, exacerbation of asthma, troponin of 0.08 and 0.04,

indeterminate reason.



PLAN:  Patient is already on doxycycline 100 mg IV every 12 hours.  Patient

is being seen by Infectious Disease also, cefepime IV 2 g b.i.d.  Patient

is getting IV fluid therapy for renal dysfunction also patient is on

vancomycin 1 g IV daily.  Patient is getting albuterol-ipratropium

nebulizer therapy.  There is no evidence of acute MI at this moment.  We

will order echocardiogram and when patient improved, we will do a stress

test.  In the meantime, we will continue present therapy and we will follow

with you.





__________________________________________

Yvette Hernandez MD





DD:  2018 14:53:43

DT:  2018 20:23:29

Job # [id number]

## 2018-07-29 NOTE — PN
DATE:  07/28/2018



SUBJECTIVE:  The patient feels better, less short of breath.  She is still

coughing, but no chest pain and no fever.  The patient does complain of hip

pain, back pain and knee pain especially at night.



PHYSICAL EXAMINATION:

VITAL SIGNS:  Temperature 98, heart rate 90, blood pressure 169/84,

respirations 19, sat 100%.

HEAD AND NECK:  Normal.  No JVD.  No thyromegaly.

CHEST:  Clear bilateral.

CARDIAC:  First sound and second sound normal.  No murmur, rub or gallop.

ABDOMEN:  Obese, nontender.

EXTREMITIES:  No edema.

NEUROLOGIC:  Normal.



LABORATORY DATA:  Laboratory study shows white count 18.8, hemoglobin 9.2,

hematocrit 29.6, platelets 284.  Chemistry shows sodium 146, potassium 4.5,

chloride 104, bicarb 31, BUN 28, creatinine 0.8, magnesium 2.6.  AST and

ALT are elevated.  Procalcitonin is very high at 9.59.



IMPRESSION AND PLAN:

1.  Acute asthma exacerbations.

2.  Sepsis due to respiratory infections.

3.  Hypertension.

4.  Chronic back pain, chronic anxiety.

5.  Leukocytosis, probably due to lung infection.

6.  Abnormal liver function test.  We will consider repeat liver function

test in the morning.  If it is still abnormal or elevated, we will consider

ultrasound of the liver and GI evaluation.  Continue current therapy.  We

will add Mucomyst, chest physical therapy, Percocet p.r.n. for pain.





__________________________________________

Aries Van MD





DD:  07/29/2018 17:32:01

DT:  07/29/2018 18:22:04

Job # [id number]

## 2018-07-30 LAB
% IRON SATURATION: 14 % (ref 20–55)
ALBUMIN SERPL-MCNC: 3.4 G/DL (ref 3–4.8)
ALBUMIN/GLOB SERPL: 1.1 {RATIO} (ref 1.1–1.8)
ALT SERPL-CCNC: 77 U/L (ref 7–56)
AST SERPL-CCNC: 58 U/L (ref 14–36)
BASOPHILS # BLD AUTO: 0.01 K/MM3 (ref 0–2)
BASOPHILS NFR BLD: 0.1 % (ref 0–3)
BUN SERPL-MCNC: 25 MG/DL (ref 7–21)
CALCIUM SERPL-MCNC: 8.9 MG/DL (ref 8.4–10.5)
EOSINOPHIL # BLD: 0 10*3/UL (ref 0–0.7)
EOSINOPHIL NFR BLD: 0 % (ref 1.5–5)
ERYTHROCYTE [DISTWIDTH] IN BLOOD BY AUTOMATED COUNT: 16.9 % (ref 11.5–14.5)
GFR NON-AFRICAN AMERICAN: > 60
GRANULOCYTES # BLD: 14.55 10*3/UL (ref 1.4–6.5)
GRANULOCYTES NFR BLD: 87.1 % (ref 50–68)
HEPATITIS A IGM: NEGATIVE
HEPATITIS B CORE AB: NEGATIVE
HEPATITIS C ANTIBODY: NEGATIVE
HGB BLD-MCNC: 10.1 G/DL (ref 12–16)
IRON SERPL-MCNC: 50 UG/DL (ref 45–180)
LYMPHOCYTES # BLD: 1.6 10*3/UL (ref 1.2–3.4)
LYMPHOCYTES NFR BLD AUTO: 9.5 % (ref 22–35)
MCH RBC QN AUTO: 27.9 PG (ref 25–35)
MCHC RBC AUTO-ENTMCNC: 31.8 G/DL (ref 31–37)
MCV RBC AUTO: 87.8 FL (ref 80–105)
MONOCYTES # BLD AUTO: 0.6 10*3/UL (ref 0.1–0.6)
MONOCYTES NFR BLD: 3.3 % (ref 1–6)
PLATELET # BLD: 380 10^3/UL (ref 120–450)
PMV BLD AUTO: 9.4 FL (ref 7–11)
RBC # BLD AUTO: 3.62 10^6/UL (ref 3.5–6.1)
TIBC SERPL-MCNC: 346 UG/DL (ref 265–497)
WBC # BLD AUTO: 16.7 10^3/UL (ref 4.5–11)

## 2018-07-30 RX ADMIN — GUAIFENESIN AND CODEINE PHOSPHATE PRN ML: 100; 10 SOLUTION ORAL at 22:50

## 2018-07-30 RX ADMIN — GUAIFENESIN SCH MG: 600 TABLET, EXTENDED RELEASE ORAL at 17:14

## 2018-07-30 RX ADMIN — MORPHINE SULFATE PRN MG: 4 INJECTION, SOLUTION INTRAMUSCULAR; INTRAVENOUS at 22:51

## 2018-07-30 RX ADMIN — OXYCODONE HYDROCHLORIDE PRN MG: 10 TABLET ORAL at 09:49

## 2018-07-30 RX ADMIN — BUDESONIDE SCH MG: 0.25 SUSPENSION RESPIRATORY (INHALATION) at 19:59

## 2018-07-30 RX ADMIN — IPRATROPIUM BROMIDE AND ALBUTEROL SULFATE SCH ML: .5; 3 SOLUTION RESPIRATORY (INHALATION) at 11:03

## 2018-07-30 RX ADMIN — GUAIFENESIN AND CODEINE PHOSPHATE PRN ML: 100; 10 SOLUTION ORAL at 09:24

## 2018-07-30 RX ADMIN — ENOXAPARIN SODIUM SCH MG: 40 INJECTION SUBCUTANEOUS at 09:25

## 2018-07-30 RX ADMIN — IPRATROPIUM BROMIDE AND ALBUTEROL SULFATE SCH ML: .5; 3 SOLUTION RESPIRATORY (INHALATION) at 16:06

## 2018-07-30 RX ADMIN — METHYLPREDNISOLONE SODIUM SUCCINATE SCH MG: 40 INJECTION, POWDER, FOR SOLUTION INTRAMUSCULAR; INTRAVENOUS at 09:27

## 2018-07-30 RX ADMIN — GUAIFENESIN AND CODEINE PHOSPHATE PRN ML: 100; 10 SOLUTION ORAL at 04:20

## 2018-07-30 RX ADMIN — VANCOMYCIN HYDROCHLORIDE SCH MLS/HR: 1 INJECTION, POWDER, LYOPHILIZED, FOR SOLUTION INTRAVENOUS at 09:27

## 2018-07-30 RX ADMIN — BUDESONIDE SCH MG: 0.25 SUSPENSION RESPIRATORY (INHALATION) at 07:49

## 2018-07-30 RX ADMIN — GUAIFENESIN SCH MG: 600 TABLET, EXTENDED RELEASE ORAL at 09:26

## 2018-07-30 RX ADMIN — OXYCODONE HYDROCHLORIDE PRN MG: 10 TABLET ORAL at 17:17

## 2018-07-30 RX ADMIN — IPRATROPIUM BROMIDE AND ALBUTEROL SULFATE SCH: .5; 3 SOLUTION RESPIRATORY (INHALATION) at 04:00

## 2018-07-30 RX ADMIN — GUAIFENESIN AND CODEINE PHOSPHATE PRN ML: 100; 10 SOLUTION ORAL at 17:14

## 2018-07-30 RX ADMIN — IPRATROPIUM BROMIDE AND ALBUTEROL SULFATE SCH ML: .5; 3 SOLUTION RESPIRATORY (INHALATION) at 07:49

## 2018-07-30 RX ADMIN — CEFEPIME SCH MLS/HR: 1 INJECTION, SOLUTION INTRAVENOUS at 06:45

## 2018-07-30 RX ADMIN — CEFEPIME SCH MLS/HR: 1 INJECTION, SOLUTION INTRAVENOUS at 17:26

## 2018-07-30 RX ADMIN — IPRATROPIUM BROMIDE AND ALBUTEROL SULFATE SCH ML: .5; 3 SOLUTION RESPIRATORY (INHALATION) at 19:58

## 2018-07-30 RX ADMIN — METHYLPREDNISOLONE SODIUM SUCCINATE SCH MG: 40 INJECTION, POWDER, FOR SOLUTION INTRAMUSCULAR; INTRAVENOUS at 21:33

## 2018-07-30 NOTE — CP.PCM.PN
Subjective





- Date & Time of Evaluation


Date of Evaluation: 07/30/18


Time of Evaluation: 06:30





- Subjective


Subjective: 





Awake, feels better, no distress, denies shortness of breath





Reason for consultation and follow up: Cardiac evaluation for shortness of 

breath, history of hypertension, asthma





Seen and examined by me and Dr. Grover





Objective





- Vital Signs/Intake and Output


Vital Signs (last 24 hours): 


 











Temp Pulse Resp BP Pulse Ox


 


 98 F   84   20   177/103 H  100 


 


 07/29/18 18:00  07/29/18 22:59  07/29/18 18:00  07/29/18 22:59  07/29/18 18:00








Intake and Output: 


 











 07/30/18 07/30/18





 06:59 18:59


 


Intake Total  360


 


Balance  360














- Medications


Medications: 


 Current Medications





Albuterol/Ipratropium (Duoneb 3 Mg/0.5 Mg (3 Ml) Ud)  3 ml IH Q2H PRN


   PRN Reason: Shortness of Breath


   Last Admin: 07/28/18 07:54 Dose:  3 ml


Albuterol/Ipratropium (Duoneb 3 Mg/0.5 Mg (3 Ml) Ud)  3 ml IH [id number] [facility]


   Last Admin: 07/30/18 04:00 Dose:  Not Given


Amlodipine Besylate (Norvasc)  5 mg PO DAILY [facility]


   Last Admin: 07/29/18 10:47 Dose:  5 mg


Atenolol (Tenormin)  25 mg PO BID [facility]


   Last Admin: 07/29/18 17:41 Dose:  25 mg


Benzonatate (Tessalon Perles)  100 mg PO TID [facility]


   Last Admin: 07/29/18 17:41 Dose:  100 mg


Budesonide (Pulmicort Respules)  0.25 mg IH [id number] [facility]


   Last Admin: 07/29/18 20:07 Dose:  0.25 mg


Clonidine HCl (Catapres)  0.1 mg PO Q4 PRN


   PRN Reason: Systolic Blood Pressure


   Last Admin: 07/29/18 05:14 Dose:  0.1 mg


Enoxaparin Sodium (Lovenox)  40 mg SC DAILY [facility]


   PRN Reason: Protocol


   Last Admin: 07/29/18 10:46 Dose:  40 mg


Guaifenesin (Mucinex La)  600 mg PO BID [facility]


   Last Admin: 07/29/18 17:41 Dose:  600 mg


Guaifenesin/Codeine Phosphate (Robitussin W/Codeine)  5 ml PO Q6H PRN


   PRN Reason: Cough and congestion


   Last Admin: 07/30/18 04:20 Dose:  5 ml


Hydralazine HCl (Apresoline)  50 mg PO Q12 [facility]


   Last Admin: 07/29/18 22:59 Dose:  50 mg


Hydrochlorothiazide (Microzide)  12.5 mg PO DAILY [facility]


   Last Admin: 07/29/18 10:46 Dose:  12.5 mg


Sodium Chloride (Sodium Chloride 0.9%)  1,000 mls @ 75 mls/hr IV .[id number] [facility]


   Last Admin: 07/28/18 22:11 Dose:  75 mls/hr


Cefepime HCl (Maxipime 2gm)  2 gm in 100 mls @ 100 mls/hr IVPB 0600,1800 [facility]


   PRN Reason: Protocol


   Stop: 08/01/18 15:01


   Last Admin: 07/30/18 06:45 Dose:  100 mls/hr


Vancomycin HCl (Vancomycin 1gm)  1 gm in 250 mls @ 167 mls/hr IVPB Q12H [facility]


   PRN Reason: Protocol


   Stop: 08/07/18 09:46


   Last Admin: 07/29/18 23:00 Dose:  167 mls/hr


Lorazepam (Ativan)  0.5 mg IVP Q6H PRN; Protocol


   PRN Reason: Anxiety


Losartan Potassium (Cozaar)  100 mg PO DAILY [facility]


   Last Admin: 07/29/18 10:46 Dose:  100 mg


Methylprednisolone (Solu-Medrol)  40 mg IVP Q12 [facility]


   Last Admin: 07/29/18 22:45 Dose:  40 mg


Morphine Sulfate (Morphine)  4 mg IVP Q4H PRN


   PRN Reason: Pain, moderate (4-7)


   Last Admin: 07/28/18 22:07 Dose:  4 mg


Oxycodone HCl (Oxycodone Immediate Release Tab)  10 mg PO BID [facility]N


   PRN Reason: Pain, severe (8-10)


   Last Admin: 07/29/18 20:51 Dose:  10 mg


Zolpidem Tartrate (Ambien)  10 mg PO HS PRN; Protocol


   PRN Reason: Insomnia


   Last Admin: 07/29/18 22:59 Dose:  10 mg











- Labs


Labs: 


 





 07/30/18 06:30 





 07/30/18 06:30 





 











PT  12.2 SECONDS (9.4-12.5)   07/27/18  12:15    


 


INR  1.06  (0.93-1.08)   07/27/18  12:15    


 


APTT  26.4 Seconds (25.1-36.5)   07/27/18  12:15    














- Constitutional


Appears: No Acute Distress





- Eye Exam


Eye Exam: Normal appearance





- ENT Exam


ENT Exam: Mucous Membranes Moist





- Respiratory Exam


Respiratory Exam: Decreased Breath Sounds, NORMAL BREATHING PATTERN





- Cardiovascular Exam


Cardiovascular Exam: REGULAR RHYTHM, +S1, +S2





- GI/Abdominal Exam


GI & Abdominal Exam: Soft, Normal Bowel Sounds





- Neurological Exam


Neurological Exam: Alert, Awake, Oriented x3





- Psychiatric Exam


Psychiatric exam: Normal Affect





- Skin


Skin Exam: Dry, Intact, Warm





Assessment and Plan





- Assessment and Plan (Free Text)


Assessment: 








A 47 year old female who came in to the ER due to shortness of breath. History 

of hypertension and asthma. Non compliant with medications,Obese. Seen by 

Psychiatry for anxiety and depression. ID on consult. D-dimer elevated, VQ scan 

done low probability of pulmonary embolism.Renal ultrasound done-unremarkable 

findings.  Exacerbation of asthma. urine positive for opiates and 

benzodiapines.uncontrolled hypertension.








Plan: 





ECHO to evaluate LV function


Uncontrolled blood pressure,


On Norvasc 5 mg daily,Tenormin 25 mg daily,Catapres 0.1 mg every 4 hours


 Lovenox 40 mg daily,Apresoline 50 mg q 12 hours,, Microzide  12.5 mg daily


 Cozaar  100 mg daily, 


Blood culture (one set) Staphyloccus aureaus


Continue antibiotics per ID


Pulmonary  on consult


Continue current treatment


Continue current medications





Will follow up





Plan and treatment discussed with Dr. Reilly taylor

## 2018-07-30 NOTE — CP.PCM.CON
<Rosalia Donis - Last Filed: 07/30/18 12:11>





History of Present Illness





- History of Present Illness


History of Present Illness: 


GI Fellow PGY5 Consult Note


This is a 48 y/o F with PMHx of Asthma, HTN, Anxiety and Bipolar disorder who 

presented to the ED complaining of shortness of breath. In the ED, patient was 

covered in stool and was showered in the ED. She was in respiratory distress in 

the ED, with wheezing on exam and accessory muscle use. with an episode of 

desaturation to 67% and placed on BiPAP. GI was consulted for elevated LFTs. Pt 

denies any history etoh abuse, no drug use and no exposure to hepatitis. She 

has no complaints related to her GI tract and tolerating a diet. No prior hx of 

colonoscopy or EGD. 





ROS: A 12pt ROS was negative except as above


PMHx: As stated in HPI


PSHx: None


SHx: Denies tobacco, drugs, etoh 


FHx: Neg for colon cancer 








Past Patient History





- Infectious Disease


Hx of Infectious Diseases: None





- Tetanus Immunizations


Tetanus Immunization: Unknown





- Past Social History


Smoking Status: Never Smoked





- CARDIAC


Hx Hypertension: Yes


Hx Peripheral Edema: Yes (ble +2 pitting)





- PULMONARY


Hx Asthma: Yes





- NEUROLOGICAL


Hx Neurological Disorder: No





- HEENT


Hx HEENT Problems: No





- RENAL


Hx Chronic Kidney Disease: No





- ENDOCRINE/METABOLIC


Hx Endocrine Disorders: No





- HEMATOLOGICAL/ONCOLOGICAL


Hx Blood Disorders: No





- INTEGUMENTARY


Hx Dermatological Problems: Yes


Other/Comment: bruising multiple left arm





- MUSCULOSKELETAL/RHEUMATOLOGICAL


Hx Falls: No





- GASTROINTESTINAL


Hx Gastrointestinal Disorders: Yes (obese)


Hx Gastroesophageal Reflux: Yes





- GENITOURINARY/GYNECOLOGICAL


Hx Genitourinary Disorders: No





- PSYCHIATRIC


Hx Substance Use: No





- SURGICAL HISTORY


Hx Surgeries: No





Meds


Allergies/Adverse Reactions: 


 Allergies











Allergy/AdvReac Type Severity Reaction Status Date / Time


 


No Known Allergies Allergy   Verified 07/27/18 11:56














- Medications


Medications: 


 Current Medications





Albuterol/Ipratropium (Duoneb 3 Mg/0.5 Mg (3 Ml) Ud)  3 ml IH Q2H PRN


   PRN Reason: Shortness of Breath


   Last Admin: 07/28/18 07:54 Dose:  3 ml


Albuterol/Ipratropium (Duoneb 3 Mg/0.5 Mg (3 Ml) Ud)  3 ml [facility]H [id number] [facility]


   Last Admin: 07/30/18 07:49 Dose:  3 ml


Amlodipine Besylate (Norvasc)  5 mg PO DAILY [facility]


   Last Admin: 07/30/18 09:26 Dose:  5 mg


Atenolol (Tenormin)  25 mg PO BID [facility]


   Last Admin: 07/30/18 09:26 Dose:  25 mg


Benzonatate (Tessalon Perles)  100 mg PO TID [facility]


   Last Admin: 07/30/18 09:25 Dose:  100 mg


Budesonide (Pulmicort Respules)  0.25 mg IH [id number] [facility]


   Last Admin: 07/30/18 07:49 Dose:  0.25 mg


Clonidine HCl (Catapres)  0.1 mg PO BID [facility]


   Last Admin: 07/30/18 09:26 Dose:  0.1 mg


Enoxaparin Sodium (Lovenox)  40 mg SC DAILY [facility]


   PRN Reason: Protocol


   Last Admin: 07/30/18 09:25 Dose:  40 mg


Guaifenesin (Mucinex La)  600 mg PO BID [facility]


   Last Admin: 07/30/18 09:26 Dose:  600 mg


Guaifenesin/Codeine Phosphate (Robitussin W/Codeine)  5 ml PO Q6H PRN


   PRN Reason: Cough and congestion


   Last Admin: 07/30/18 09:24 Dose:  5 ml


Hydralazine HCl (Apresoline)  50 mg PO Q12 [facility]


   Last Admin: 07/30/18 09:24 Dose:  50 mg


Hydralazine HCl (Apresoline)  10 mg IVP Q6 PRN


   PRN Reason: Systolic Blood Pressure


Hydrochlorothiazide (Microzide)  12.5 mg PO DAILY [facility]


   Last Admin: 07/30/18 09:27 Dose:  12.5 mg


Sodium Chloride (Sodium Chloride 0.9%)  1,000 mls @ 75 mls/hr IV .[id number] [facility]


   Last Admin: 07/28/18 22:11 Dose:  75 mls/hr


Cefepime HCl (Maxipime 2gm)  2 gm in 100 mls @ 100 mls/hr IVPB 0600,1800 [facility]


   PRN Reason: Protocol


   Stop: 08/01/18 15:01


   Last Admin: 07/30/18 06:45 Dose:  100 mls/hr


Vancomycin HCl (Vancomycin 1gm)  1 gm in 250 mls @ 167 mls/hr IVPB Q12H [facility]


   PRN Reason: Protocol


   Stop: 08/07/18 09:46


   Last Admin: 07/30/18 09:27 Dose:  167 mls/hr


Lorazepam (Ativan)  0.5 mg IVP Q6H PRN; Protocol


   PRN Reason: Anxiety


Losartan Potassium (Cozaar)  100 mg PO DAILY [facility]


   Last Admin: 07/30/18 09:25 Dose:  100 mg


Methylprednisolone (Solu-Medrol)  40 mg IVP Q12 [facility]


   Last Admin: 07/30/18 09:27 Dose:  40 mg


Morphine Sulfate (Morphine)  4 mg IVP Q4H PRN


   PRN Reason: Pain, moderate (4-7)


   Last Admin: 07/28/18 22:07 Dose:  4 mg


Oxycodone HCl (Oxycodone Immediate Release Tab)  10 mg PO BID PRN


   PRN Reason: Pain, severe (8-10)


   Last Admin: 07/30/18 09:49 Dose:  10 mg


Zolpidem Tartrate (Ambien)  10 mg PO HS PRN; Protocol


   PRN Reason: Insomnia


   Last Admin: 07/29/18 22:59 Dose:  10 mg











Physical Exam





- Constitutional


Appears: Non-toxic, No Acute Distress





- Head Exam


Head Exam: ATRAUMATIC, NORMAL INSPECTION, NORMOCEPHALIC





- Eye Exam


Eye Exam: EOMI, Normal appearance, PERRL


Pupil Exam: PERRL





- ENT Exam


ENT Exam: Mucous Membranes Moist





- Respiratory Exam


Respiratory Exam: Decreased Breath Sounds, NORMAL BREATHING PATTERN





- Cardiovascular Exam


Cardiovascular Exam: RRR, +S1, +S2





- GI/Abdominal Exam


GI & Abdominal Exam: Normal Bowel Sounds, Soft.  absent: Distended, Guarding, 

Organomegaly





- Rectal Exam


Rectal Exam: Deferred





- Extremities Exam


Extremities exam: Positive for: full ROM, normal inspection





- Back Exam


Back exam: NORMAL INSPECTION





- Neurological Exam


Neurological exam: Alert, Oriented x3





- Psychiatric Exam


Psychiatric exam: Normal Affect, Normal Mood





- Skin


Skin Exam: Dry, Intact, Normal Color, Warm





Results





- Vital Signs


Recent Vital Signs: 


 Last Vital Signs











Temp  98.5 F   07/30/18 08:05


 


Pulse  71   07/30/18 09:26


 


Resp  20   07/30/18 08:05


 


BP  164/77 H  07/30/18 09:26


 


Pulse Ox  95   07/30/18 08:05














- Labs


Result Diagrams: 


 07/30/18 06:30





 07/30/18 06:30


Labs: 


 Laboratory Results - last 24 hr











  07/29/18 07/29/18 07/29/18





  10:15 10:15 10:15


 


WBC  14.6 H D  


 


RBC  3.76  


 


Hgb  10.5 L  


 


Hct  33.2 L  


 


MCV  88.3  


 


MCH  27.9  


 


MCHC  31.6  


 


RDW  16.8 H  


 


Plt Count  360  


 


MPV  8.9  


 


Gran %  92.4 H  


 


Lymph % (Auto)  6.6 L  


 


Mono % (Auto)  1.0  


 


Eos % (Auto)  0.0 L  


 


Baso % (Auto)  0.0  


 


Gran #  13.54 H  


 


Lymph # (Auto)  1.0 L  


 


Mono # (Auto)  0.1  


 


Eos # (Auto)  0.0  


 


Baso # (Auto)  0.00  


 


ESR    107 H


 


Sodium   145 


 


Potassium   4.1 


 


Chloride   104 


 


Carbon Dioxide   25 


 


Anion Gap   20 


 


BUN   27 H 


 


Creatinine   0.7 


 


Est GFR ( Amer)   > 60 


 


Est GFR (Non-Af Amer)   > 60 


 


Random Glucose   145 H 


 


Calcium   9.3 


 


Phosphorus   2.2 L 


 


Magnesium   2.1 


 


Total Bilirubin   0.8 


 


AST   47 H D 


 


ALT   69 H 


 


Alkaline Phosphatase   146 H D 


 


C-Reactive Protein   


 


Total Protein   7.5 


 


Albumin   4.0 


 


Globulin   3.6 


 


Albumin/Globulin Ratio   1.1 














  07/29/18 07/30/18 07/30/18





  10:15 06:30 06:30


 


WBC   16.7 H 


 


RBC   3.62 


 


Hgb   10.1 L 


 


Hct   31.8 L 


 


MCV   87.8 


 


MCH   27.9 


 


MCHC   31.8 


 


RDW   16.9 H 


 


Plt Count   380 


 


MPV   9.4 


 


Gran %   87.1 H 


 


Lymph % (Auto)   9.5 L 


 


Mono % (Auto)   3.3 


 


Eos % (Auto)   0.0 L 


 


Baso % (Auto)   0.1 


 


Gran #   14.55 H 


 


Lymph # (Auto)   1.6 


 


Mono # (Auto)   0.6 


 


Eos # (Auto)   0.0 


 


Baso # (Auto)   0.01 


 


ESR   


 


Sodium    142


 


Potassium    4.4


 


Chloride    105


 


Carbon Dioxide    29


 


Anion Gap    13


 


BUN    25 H


 


Creatinine    0.8


 


Est GFR ( Amer)    > 60


 


Est GFR (Non-Af Amer)    > 60


 


Random Glucose    118 H


 


Calcium    8.9


 


Phosphorus    3.5


 


Magnesium    1.9


 


Total Bilirubin    0.8


 


AST    58 H D


 


ALT    77 H


 


Alkaline Phosphatase    128 H


 


C-Reactive Protein  194.10 H  


 


Total Protein    6.6


 


Albumin    3.4


 


Globulin    3.2


 


Albumin/Globulin Ratio    1.1














Assessment & Plan





- Assessment and Plan (Free Text)


Assessment: 


This is a 47yf presenting with SOB. 


1. Elevated LFTs


2. Asthma and COPD exacerbation 








Plan: 


-Continue supportive care and medical treatment per primary team 


-Pt with elevated LFTs and no significant abnormality of alk phos and Tbili 


-No abdominal pain


-Will order hepatitis and autoimmune serologies


-Abd US to evaluate liver parenchyma


-Monitor LFTs, likely elevated from fatty liver


-Will continue to follow pt closely 








<Mike Felder - Last Filed: 07/30/18 21:51>





Meds





- Medications


Medications: 


 Current Medications





Albuterol/Ipratropium (Duoneb 3 Mg/0.5 Mg (3 Ml) Ud)  3 ml IH Q2H PRN


   PRN Reason: Shortness of Breath


   Last Admin: 07/28/18 07:54 Dose:  3 ml


Albuterol/Ipratropium (Duoneb 3 Mg/0.5 Mg (3 Ml) Ud)  3 ml IH [id number] [facility]


   Last Admin: 07/30/18 19:58 Dose:  3 ml


Amlodipine Besylate (Norvasc)  5 mg PO DAILY [facility]


   Last Admin: 07/30/18 09:26 Dose:  5 mg


Atenolol (Tenormin)  25 mg PO BID [facility]


   Last Admin: 07/30/18 17:23 Dose:  25 mg


Benzonatate (Tessalon Perles)  100 mg PO TID [facility]


   Last Admin: 07/30/18 17:27 Dose:  100 mg


Budesonide (Pulmicort Respules)  0.25 mg IH [id number] [facility]


   Last Admin: 07/30/18 19:59 Dose:  0.25 mg


Clonidine HCl (Catapres)  0.1 mg PO BID [facility]


   Last Admin: 07/30/18 17:13 Dose:  0.1 mg


Enoxaparin Sodium (Lovenox)  40 mg SC DAILY [facility]


   PRN Reason: Protocol


   Last Admin: 07/30/18 09:25 Dose:  40 mg


Guaifenesin (Mucinex La)  600 mg PO BID [facility]


   Last Admin: 07/30/18 17:14 Dose:  600 mg


Guaifenesin/Codeine Phosphate (Robitussin W/Codeine)  5 ml PO Q6H PRN


   PRN Reason: Cough and congestion


   Last Admin: 07/30/18 17:14 Dose:  5 ml


Hydralazine HCl (Apresoline)  50 mg PO Q12 [facility]


   Last Admin: 07/30/18 21:32 Dose:  50 mg


Hydralazine HCl (Apresoline)  10 mg IVP Q6 PRN


   PRN Reason: Systolic Blood Pressure


   Last Admin: 07/30/18 17:23 Dose:  10 mg


Hydrochlorothiazide (Microzide)  12.5 mg PO DAILY [facility]


   Last Admin: 07/30/18 09:27 Dose:  12.5 mg


Sodium Chloride (Sodium Chloride 0.9%)  1,000 mls @ 75 mls/hr IV .[id number] [facility]


   Last Admin: 07/30/18 14:21 Dose:  75 mls/hr


Cefepime HCl (Maxipime 2gm)  2 gm in 100 mls @ 100 mls/hr IVPB 0600,1800 [facility]


   PRN Reason: Protocol


   Stop: 08/01/18 15:01


   Last Admin: 07/30/18 17:26 Dose:  100 mls/hr


Lorazepam (Ativan)  0.5 mg IVP Q6H PRN; Protocol


   PRN Reason: Anxiety


   Last Admin: 07/30/18 17:13 Dose:  0.5 mg


Losartan Potassium (Cozaar)  100 mg PO DAILY [facility]


   Last Admin: 07/30/18 09:25 Dose:  100 mg


Methylprednisolone (Solu-Medrol)  40 mg IVP Q12 [facility]


   Last Admin: 07/30/18 21:33 Dose:  40 mg


Morphine Sulfate (Morphine)  4 mg IVP Q4H PRN


   PRN Reason: Pain, moderate (4-7)


   Last Admin: 07/28/18 22:07 Dose:  4 mg


Oxycodone HCl (Oxycodone Immediate Release Tab)  10 mg PO BID PRN


   PRN Reason: Pain, severe (8-10)


   Last Admin: 07/30/18 17:17 Dose:  10 mg


Zolpidem Tartrate (Ambien)  10 mg PO HS PRN; Protocol


   PRN Reason: Insomnia


   Last Admin: 07/29/18 22:59 Dose:  10 mg











Results





- Vital Signs


Recent Vital Signs: 


 Last Vital Signs











Temp  98.5 F   07/30/18 19:00


 


Pulse  71   07/30/18 21:32


 


Resp  20   07/30/18 19:00


 


BP  166/87 H  07/30/18 21:32


 


Pulse Ox  96   07/30/18 19:00














- Labs


Result Diagrams: 


 07/30/18 06:30





 07/30/18 06:30


Labs: 


 Laboratory Results - last 24 hr











  07/30/18 07/30/18 07/30/18





  06:30 06:30 11:00


 


WBC  16.7 H  


 


RBC  3.62  


 


Hgb  10.1 L  


 


Hct  31.8 L  


 


MCV  87.8  


 


MCH  27.9  


 


MCHC  31.8  


 


RDW  16.9 H  


 


Plt Count  380  


 


MPV  9.4  


 


Gran %  87.1 H  


 


Lymph % (Auto)  9.5 L  


 


Mono % (Auto)  3.3  


 


Eos % (Auto)  0.0 L  


 


Baso % (Auto)  0.1  


 


Gran #  14.55 H  


 


Lymph # (Auto)  1.6  


 


Mono # (Auto)  0.6  


 


Eos # (Auto)  0.0  


 


Baso # (Auto)  0.01  


 


Sodium   142 


 


Potassium   4.4 


 


Chloride   105 


 


Carbon Dioxide   29 


 


Anion Gap   13 


 


BUN   25 H 


 


Creatinine   0.8 


 


Est GFR ( Amer)   > 60 


 


Est GFR (Non-Af Amer)   > 60 


 


Random Glucose   118 H 


 


Calcium   8.9 


 


Phosphorus   3.5 


 


Magnesium   1.9 


 


Iron   


 


TIBC   


 


% Saturation   


 


Ferritin   


 


Total Bilirubin   0.8 


 


AST   58 H D 


 


ALT   77 H 


 


Alkaline Phosphatase   128 H 


 


Total Protein   6.6 


 


Albumin   3.4 


 


Globulin   3.2 


 


Albumin/Globulin Ratio   1.1 


 


IgG   


 


Hepatitis A IgM Ab    Negative


 


Hep Bs Antigen    Negative


 


Hep B Core IgM Ab    Negative


 


Hepatitis C Antibody    Negative














  07/30/18 07/30/18 07/30/18





  11:00 11:00 11:00


 


WBC   


 


RBC   


 


Hgb   


 


Hct   


 


MCV   


 


MCH   


 


MCHC   


 


RDW   


 


Plt Count   


 


MPV   


 


Gran %   


 


Lymph % (Auto)   


 


Mono % (Auto)   


 


Eos % (Auto)   


 


Baso % (Auto)   


 


Gran #   


 


Lymph # (Auto)   


 


Mono # (Auto)   


 


Eos # (Auto)   


 


Baso # (Auto)   


 


Sodium   


 


Potassium   


 


Chloride   


 


Carbon Dioxide   


 


Anion Gap   


 


BUN   


 


Creatinine   


 


Est GFR ( Amer)   


 


Est GFR (Non-Af Amer)   


 


Random Glucose   


 


Calcium   


 


Phosphorus   


 


Magnesium   


 


Iron    50


 


TIBC    346


 


% Saturation    14 L


 


Ferritin   35.7 


 


Total Bilirubin   


 


AST   


 


ALT   


 


Alkaline Phosphatase   


 


Total Protein   


 


Albumin   


 


Globulin   


 


Albumin/Globulin Ratio   


 


IgG  785.6  


 


Hepatitis A IgM Ab   


 


Hep Bs Antigen   


 


Hep B Core IgM Ab   


 


Hepatitis C Antibody   














Attending/Attestation





- Attestation


I have personally seen and examined this patient.: Yes


I have fully participated in the care of the patient.: Yes


I have reviewed all pertinent clinical information: Yes


Notes (Text): 


Associated This is an addendum to GI consult report dictated by the GI 

Fellow.The patient was seen and examined earlier.  Medical records, lab studies

, imagings were reviewed.  Last 24 hours events reviewed.  Agreed with the 

above treatment plan as outlined in GI Fellow 's notes the with the addition of 

the following


elevated liver enzyme


Sonogram reviewed would recommend hepatitis profile


Autoimmune markers


follow-up LFTs


further evaluation based on above workup and clinical course


07/30/18 21:49

## 2018-07-30 NOTE — US
Date of service: 



07/30/2018



HISTORY:

abnormal lfts



COMPARISON:

None.



TECHNIQUE:

Sonographic evaluation of the right upper quadrant of the abdomen.



FINDINGS:



LIVER:

Measures 18.5 cm in length. Increased echogenicity of the liver 

parenchyma.  No mass. No intrahepatic bile duct dilatation. 



GALLBLADDER:

The gallbladder is contracted 



COMMON BILE DUCT:

Measures 5.1 mm.  No stones. No dilatation.



PANCREAS:

Unremarkable as visualized. No mass. No ductal dilatation.



RIGHT KIDNEY:

Measures 10.2 x 5.2 x 4.9 cm in length. Normal echogenicity. No 

calculus, mass, or hydronephrosis.



AORTA:

No aneurysmal dilatation.



IVC:

Unremarkable.



OTHER FINDINGS:

None .



IMPRESSION:

No significant findings

## 2018-07-30 NOTE — CP.PCM.PN
Subjective





- Date & Time of Evaluation


Date of Evaluation: 07/30/18


Time of Evaluation: 12:55





- Subjective


Subjective: 





Breathing better, no fevers, not in distress, no diarrhea, no nausea.





Objective





- Vital Signs/Intake and Output


Vital Signs (last 24 hours): 


 











Temp Pulse Resp BP Pulse Ox


 


 98.5 F   71   20   164/77 H  95 


 


 07/30/18 08:05  07/30/18 09:26  07/30/18 08:05  07/30/18 09:26  07/30/18 08:05








Intake and Output: 


 











 07/30/18 07/30/18





 06:59 18:59


 


Intake Total  360


 


Balance  360














- Medications


Medications: 


 Current Medications





Albuterol/Ipratropium (Duoneb 3 Mg/0.5 Mg (3 Ml) Ud)  3 ml IH Q2H PRN


   PRN Reason: Shortness of Breath


   Last Admin: 07/28/18 07:54 Dose:  3 ml


Albuterol/Ipratropium (Duoneb 3 Mg/0.5 Mg (3 Ml) Ud)  3 ml IH [id number] [facility]


   Last Admin: 07/30/18 11:03 Dose:  3 ml


Amlodipine Besylate (Norvasc)  5 mg PO DAILY [facility]


   Last Admin: 07/30/18 09:26 Dose:  5 mg


Atenolol (Tenormin)  25 mg PO BID [facility]


   Last Admin: 07/30/18 09:26 Dose:  25 mg


Benzonatate (Tessalon Perles)  100 mg PO TID [facility]


   Last Admin: 07/30/18 09:25 Dose:  100 mg


Budesonide (Pulmicort Respules)  0.25 mg IH [id number] [facility]


   Last Admin: 07/30/18 07:49 Dose:  0.25 mg


Clonidine HCl (Catapres)  0.1 mg PO BID [facility]


   Last Admin: 07/30/18 09:26 Dose:  0.1 mg


Enoxaparin Sodium (Lovenox)  40 mg SC DAILY [facility]


   PRN Reason: Protocol


   Last Admin: 07/30/18 09:25 Dose:  40 mg


Guaifenesin (Mucinex La)  600 mg PO BID [facility]


   Last Admin: 07/30/18 09:26 Dose:  600 mg


Guaifenesin/Codeine Phosphate (Robitussin W/Codeine)  5 ml PO Q6H PRN


   PRN Reason: Cough and congestion


   Last Admin: 07/30/18 09:24 Dose:  5 ml


Hydralazine HCl (Apresoline)  50 mg PO Q12 [facility]


   Last Admin: 07/30/18 09:24 Dose:  50 mg


Hydralazine HCl (Apresoline)  10 mg IVP Q6 PRN


   PRN Reason: Systolic Blood Pressure


Hydrochlorothiazide (Microzide)  12.5 mg PO DAILY [facility]


   Last Admin: 07/30/18 09:27 Dose:  12.5 mg


Sodium Chloride (Sodium Chloride 0.9%)  1,000 mls @ 75 mls/hr IV .[id number] [facility]


   Last Admin: 07/28/18 22:11 Dose:  75 mls/hr


Cefepime HCl (Maxipime 2gm)  2 gm in 100 mls @ 100 mls/hr IVPB 0600,1800 [facility]


   PRN Reason: Protocol


   Stop: 08/01/18 15:01


   Last Admin: 07/30/18 06:45 Dose:  100 mls/hr


Vancomycin HCl (Vancomycin 1gm)  1 gm in 250 mls @ 167 mls/hr IVPB Q12H [facility]


   PRN Reason: Protocol


   Stop: 08/07/18 09:46


   Last Admin: 07/30/18 09:27 Dose:  167 mls/hr


Lorazepam (Ativan)  0.5 mg IVP Q6H PRN; Protocol


   PRN Reason: Anxiety


Losartan Potassium (Cozaar)  100 mg PO DAILY [facility]


   Last Admin: 07/30/18 09:25 Dose:  100 mg


Methylprednisolone (Solu-Medrol)  40 mg IVP Q12 [facility]


   Last Admin: 07/30/18 09:27 Dose:  40 mg


Morphine Sulfate (Morphine)  4 mg IVP Q4H PRN


   PRN Reason: Pain, moderate (4-7)


   Last Admin: 07/28/18 22:07 Dose:  4 mg


Oxycodone HCl (Oxycodone Immediate Release Tab)  10 mg PO BID PRN


   PRN Reason: Pain, severe (8-10)


   Last Admin: 07/30/18 09:49 Dose:  10 mg


Zolpidem Tartrate (Ambien)  10 mg PO HS PRN; Protocol


   PRN Reason: Insomnia


   Last Admin: 07/29/18 22:59 Dose:  10 mg











- Labs


Labs: 


 





 07/30/18 06:30 





 07/30/18 06:30 





 











PT  12.2 SECONDS (9.4-12.5)   07/27/18  12:15    


 


INR  1.06  (0.93-1.08)   07/27/18  12:15    


 


APTT  26.4 Seconds (25.1-36.5)   07/27/18  12:15    














- Constitutional


Appears: Chronically Ill





- Head Exam


Head Exam: NORMAL INSPECTION





- Neck Exam


Neck Exam: absent: Meningismus





- Respiratory Exam


Respiratory Exam: Decreased Breath Sounds





- Cardiovascular Exam


Cardiovascular Exam: +S1, +S2





- GI/Abdominal Exam


GI & Abdominal Exam: Soft.  absent: Tenderness





Assessment and Plan





- Assessment and Plan (Free Text)


Plan: 





Assessment


severe sepsis due to healthcare-associated pneumonia with possible gram 

positive cocci and/or gram negative bacilli and/or atypical organisms, as well 

as Methicillin-sensitive Staph aureus bacteremia, source to be determined 


chronic asthma


obesity with BMI 38


anxiety


bipolar disorder


HTN





Plan


continue Cefepime (which should cover MSSA) and Doxycycline day 4 and will 

follow up repeat blood cx; should get 2D echo


will monitor clinically

## 2018-07-31 LAB
ALBUMIN SERPL-MCNC: 3.5 G/DL (ref 3–4.8)
ALBUMIN/GLOB SERPL: 1.1 {RATIO} (ref 1.1–1.8)
ALT SERPL-CCNC: 73 U/L (ref 7–56)
APPEARANCE UR: CLEAR
AST SERPL-CCNC: 32 U/L (ref 14–36)
BILIRUB UR-MCNC: NEGATIVE MG/DL
BUN SERPL-MCNC: 20 MG/DL (ref 7–21)
CALCIUM SERPL-MCNC: 9.1 MG/DL (ref 8.4–10.5)
CERULOPLASMIN SERPL-MCNC: 42 MG/DL (ref 18–53)
COLOR UR: YELLOW
GFR NON-AFRICAN AMERICAN: > 60
GLUCOSE UR STRIP-MCNC: NEGATIVE MG/DL
LEUKOCYTE ESTERASE UR-ACNC: NEGATIVE LEU/UL
PH UR STRIP: 5.5 [PH] (ref 4.7–8)
PROT UR STRIP-MCNC: NEGATIVE MG/DL
RBC # UR STRIP: NEGATIVE /UL
SP GR UR STRIP: 1.01 (ref 1–1.03)
UROBILINOGEN UR STRIP-ACNC: 0.2 E.U./DL

## 2018-07-31 RX ADMIN — GUAIFENESIN AND CODEINE PHOSPHATE PRN ML: 100; 10 SOLUTION ORAL at 22:04

## 2018-07-31 RX ADMIN — IPRATROPIUM BROMIDE AND ALBUTEROL SULFATE SCH ML: .5; 3 SOLUTION RESPIRATORY (INHALATION) at 16:26

## 2018-07-31 RX ADMIN — MORPHINE SULFATE PRN MG: 4 INJECTION, SOLUTION INTRAMUSCULAR; INTRAVENOUS at 23:38

## 2018-07-31 RX ADMIN — GUAIFENESIN SCH MG: 600 TABLET, EXTENDED RELEASE ORAL at 17:09

## 2018-07-31 RX ADMIN — PURIFIED WATER PRN LOZ: 99.05 LIQUID OPHTHALMIC at 22:14

## 2018-07-31 RX ADMIN — IPRATROPIUM BROMIDE AND ALBUTEROL SULFATE SCH: .5; 3 SOLUTION RESPIRATORY (INHALATION) at 04:26

## 2018-07-31 RX ADMIN — GUAIFENESIN AND CODEINE PHOSPHATE PRN ML: 100; 10 SOLUTION ORAL at 06:11

## 2018-07-31 RX ADMIN — METHYLPREDNISOLONE SODIUM SUCCINATE SCH MG: 40 INJECTION, POWDER, FOR SOLUTION INTRAMUSCULAR; INTRAVENOUS at 22:12

## 2018-07-31 RX ADMIN — IPRATROPIUM BROMIDE AND ALBUTEROL SULFATE SCH: .5; 3 SOLUTION RESPIRATORY (INHALATION) at 01:10

## 2018-07-31 RX ADMIN — IPRATROPIUM BROMIDE AND ALBUTEROL SULFATE SCH ML: .5; 3 SOLUTION RESPIRATORY (INHALATION) at 07:31

## 2018-07-31 RX ADMIN — MORPHINE SULFATE PRN MG: 4 INJECTION, SOLUTION INTRAMUSCULAR; INTRAVENOUS at 09:39

## 2018-07-31 RX ADMIN — CEFAZOLIN SCH MLS/HR: 330 INJECTION, POWDER, FOR SOLUTION INTRAMUSCULAR; INTRAVENOUS at 14:33

## 2018-07-31 RX ADMIN — BUDESONIDE SCH MG: 0.25 SUSPENSION RESPIRATORY (INHALATION) at 07:31

## 2018-07-31 RX ADMIN — BUDESONIDE SCH MG: 0.25 SUSPENSION RESPIRATORY (INHALATION) at 19:58

## 2018-07-31 RX ADMIN — PURIFIED WATER PRN LOZ: 99.05 LIQUID OPHTHALMIC at 17:09

## 2018-07-31 RX ADMIN — IPRATROPIUM BROMIDE AND ALBUTEROL SULFATE SCH ML: .5; 3 SOLUTION RESPIRATORY (INHALATION) at 19:58

## 2018-07-31 RX ADMIN — GUAIFENESIN AND CODEINE PHOSPHATE PRN ML: 100; 10 SOLUTION ORAL at 14:33

## 2018-07-31 RX ADMIN — NYSTATIN SCH ML: 100000 SUSPENSION ORAL at 22:10

## 2018-07-31 RX ADMIN — GUAIFENESIN SCH MG: 600 TABLET, EXTENDED RELEASE ORAL at 09:32

## 2018-07-31 RX ADMIN — METHYLPREDNISOLONE SODIUM SUCCINATE SCH MG: 40 INJECTION, POWDER, FOR SOLUTION INTRAMUSCULAR; INTRAVENOUS at 09:31

## 2018-07-31 RX ADMIN — ENOXAPARIN SODIUM SCH MG: 40 INJECTION SUBCUTANEOUS at 09:31

## 2018-07-31 RX ADMIN — CEFEPIME SCH MLS/HR: 1 INJECTION, SOLUTION INTRAVENOUS at 05:32

## 2018-07-31 RX ADMIN — MORPHINE SULFATE PRN MG: 4 INJECTION, SOLUTION INTRAMUSCULAR; INTRAVENOUS at 19:19

## 2018-07-31 RX ADMIN — IPRATROPIUM BROMIDE AND ALBUTEROL SULFATE SCH ML: .5; 3 SOLUTION RESPIRATORY (INHALATION) at 11:09

## 2018-07-31 RX ADMIN — CEFAZOLIN SCH MLS/HR: 330 INJECTION, POWDER, FOR SOLUTION INTRAMUSCULAR; INTRAVENOUS at 22:15

## 2018-07-31 NOTE — PN
Copied To:  Yvette Pena MD



DATE:  07/30/2018



PULMONARY PROGRESS NOTE



REFERRING PHYSICIAN:  Aries Van MD.



SUBJECTIVE:  The patient is out of bed to chair, feels much better,

breathing is better.  Still has a cough.  No chest pain.  No nausea,

vomiting, diarrhea, leg pain, leg swelling.



OBJECTIVE:

GENERAL:  In no acute distress.

VITAL SIGNS:  Temperature is 98, heart rate is 71, respiratory rate is 20,

blood pressure 166/87, pulse ox 96% on room air.

HEENT:  Moist mucous membrane.  Crowded airway.

NECK:  Supple.  No JVD.

LUNGS:  Better airflow.  Prolonged expiratory phase.  Not much wheezing.

HEART:  S1 and S2.

ABDOMEN:  Soft, nontender.  No organomegaly.

EXTREMITIES:  There is no edema.

NEUROLOGICAL:  Awake and alert.  Follows simple command.



MEDICATIONS:  She is on Ambien 10 mg at bedtime p.r.n., hydralazine 50 mg

twice a day, Ativan 0.5 mg every 6 hours p.r.n., clonidine 0.1 mg twice a

day, Cozaar 100 mg daily, DuoNeb every 2 hours p.r.n. and every 4 hours

round-the-clock, Lovenox 40 mg daily, cefepime 1 g IV every 12 hours,

hydrochlorothiazide 12.5 mg daily, morphine 4 mg every 4 hours p.r.n.,

Mucinex  mg twice a day, Norvasc 5 mg daily, oxycodone 10 mg every 12

hours p.r.n., Pulmicort inhaled twice a day, IV fluid normal saline 75

mL/hour, Solu-Medrol 40 mg every 12 hours, Tenormin 25 mg twice a day,

Tessalon Perles 100 mg three times a day.



LABORATORY DATA:  Shows hemoglobin 10.1, hematocrit 31.8, WBC 16.7,

platelet count is 380.  Sodium 142, potassium 4.4, chloride 105,

bicarbonate 29, BUN 25, creatinine 0.8, glucose 118, calcium 8.9,

phosphorus 3.5, magnesium 1.9, iron is 50, AST 58, ALT 77, alk phos is 128,

C-reactive protein of 194, albumin is 3.4.  Has a gallbladder ultrasound

done yesterday shows no significant finding.



IMPRESSION AND PLAN:  Exacerbation of chronic obstructive lung disease,

bipolar disorder, hypertension, renal insufficiency.  Pulmonary point of

view, doing well.  Continue Solu-Medrol, inhaled bronchodilator,

antibiotics, gastric prophylaxis, deep venous thrombosis prophylaxis, fall

precaution, pain management, cough suppressor.  Educated the patient about

noxious stimuli to avoid triggers.



Thank you and we will follow with you.





__________________________________________

Yvette Pena MD





DD:  07/31/2018 0:20:00

DT:  07/31/2018 0:22:20

Job # [id number]

## 2018-07-31 NOTE — CP.PCM.PN
<Rosalia Donis - Last Filed: 07/31/18 18:57>





Subjective





- Date & Time of Evaluation


Date of Evaluation: 07/31/18


Time of Evaluation: 07:00





- Subjective


Subjective: 


GI Fellow PGY5 Progress Note


Pt seen and evaluated at bedside, she is doing well with no complaints. No 

abdominal pain, N/V. Tolerating diet. 


ROS: A 12pt ROS was negative except as above. 








Objective





- Vital Signs/Intake and Output


Vital Signs (last 24 hours): 


 











Temp Pulse Resp BP Pulse Ox


 


 99.7 F H  74   20   200/100 H  99 


 


 07/31/18 00:00  07/31/18 00:00  07/31/18 00:00  07/31/18 07:04  07/31/18 00:00








Intake and Output: 


 











 07/31/18 07/31/18





 06:59 18:59


 


Intake Total 100 


 


Balance 100 














- Medications


Medications: 


 Current Medications





Albuterol/Ipratropium (Duoneb 3 Mg/0.5 Mg (3 Ml) Ud)  3 ml IH Q2H PRN


   PRN Reason: Shortness of Breath


   Last Admin: 07/28/18 07:54 Dose:  3 ml


Albuterol/Ipratropium (Duoneb 3 Mg/0.5 Mg (3 Ml) Ud)  3 ml IH [id number] [facility]


   Last Admin: 07/31/18 07:31 Dose:  3 ml


Amlodipine Besylate (Norvasc)  5 mg PO DAILY [facility]


   Last Admin: 07/30/18 09:26 Dose:  5 mg


Amlodipine Besylate (Norvasc)  10 mg PO DAILY [facility]


Atenolol (Tenormin)  25 mg PO BID [facility]


   Last Admin: 07/30/18 17:23 Dose:  25 mg


Benzonatate (Tessalon Perles)  100 mg PO TID [facility]


   Last Admin: 07/30/18 17:27 Dose:  100 mg


Budesonide (Pulmicort Respules)  0.25 mg IH [id number] [facility]


   Last Admin: 07/31/18 07:31 Dose:  0.25 mg


Chlorthalidone (Hygroton)  25 mg PO DAILY [facility]


Clonidine HCl (Catapres)  0.1 mg PO TID [facility]


Enoxaparin Sodium (Lovenox)  40 mg SC DAILY [facility]


   PRN Reason: Protocol


   Last Admin: 07/30/18 09:25 Dose:  40 mg


Guaifenesin (Mucinex La)  600 mg PO BID [facility]


   Last Admin: 07/30/18 17:14 Dose:  600 mg


Guaifenesin/Codeine Phosphate (Robitussin W/Codeine)  5 ml PO Q6H PRN


   PRN Reason: Cough and congestion


   Last Admin: 07/31/18 06:11 Dose:  5 ml


Hydralazine HCl (Apresoline)  50 mg PO Q12 [facility]


   Last Admin: 07/30/18 21:32 Dose:  50 mg


Hydralazine HCl (Apresoline)  10 mg IVP Q6 PRN


   PRN Reason: Systolic Blood Pressure


   Last Admin: 07/30/18 17:23 Dose:  10 mg


Cefepime HCl (Maxipime 2gm)  2 gm in 100 mls @ 100 mls/hr IVPB 0600,1800 [facility]


   PRN Reason: Protocol


   Stop: 08/01/18 15:01


   Last Admin: 07/31/18 05:32 Dose:  100 mls/hr


Lorazepam (Ativan)  0.5 mg IVP Q6H PRN; Protocol


   PRN Reason: Anxiety


   Last Admin: 07/30/18 17:13 Dose:  0.5 mg


Losartan Potassium (Cozaar)  100 mg PO DAILY [facility]


   Last Admin: 07/30/18 09:25 Dose:  100 mg


Methylprednisolone (Solu-Medrol)  40 mg IVP Q12 [facility]


   Last Admin: 07/30/18 21:33 Dose:  40 mg


Morphine Sulfate (Morphine)  4 mg IVP Q4H PRN


   PRN Reason: Pain, moderate (4-7)


   Last Admin: 07/30/18 22:51 Dose:  4 mg


Oxycodone HCl (Oxycodone Immediate Release Tab)  10 mg PO BID PRN


   PRN Reason: Pain, severe (8-10)


   Last Admin: 07/30/18 17:17 Dose:  10 mg


Zolpidem Tartrate (Ambien)  10 mg PO HS PRN; Protocol


   PRN Reason: Insomnia


   Last Admin: 07/31/18 00:55 Dose:  10 mg











- Labs


Labs: 


 





 07/30/18 06:30 





 07/31/18 07:00 





 











PT  12.2 SECONDS (9.4-12.5)   07/27/18  12:15    


 


INR  1.06  (0.93-1.08)   07/27/18  12:15    


 


APTT  26.4 Seconds (25.1-36.5)   07/27/18  12:15    














- Constitutional


Appears: Non-toxic, No Acute Distress





- Head Exam


Head Exam: ATRAUMATIC, NORMAL INSPECTION, NORMOCEPHALIC





- Eye Exam


Eye Exam: EOMI, Normal appearance, PERRL


Pupil Exam: PERRL





- ENT Exam


ENT Exam: Mucous Membranes Moist





- Neck Exam


Neck Exam: Full ROM, Normal Inspection





- Respiratory Exam


Respiratory Exam: Clear to Ausculation Bilateral, NORMAL BREATHING PATTERN





- Cardiovascular Exam


Cardiovascular Exam: REGULAR RHYTHM, RRR, +S1, +S2





- GI/Abdominal Exam


GI & Abdominal Exam: Soft, Normal Bowel Sounds.  absent: Tenderness, 

Organomegaly





- Rectal Exam


Rectal Exam: Deferred





- Extremities Exam


Extremities Exam: Full ROM, Normal Inspection





- Back Exam


Back Exam: NORMAL INSPECTION





- Neurological Exam


Neurological Exam: Alert, Awake, Oriented x3





- Psychiatric Exam


Psychiatric exam: Normal Affect, Normal Mood





- Skin


Skin Exam: Dry, Intact, Normal Color, Warm





Assessment and Plan





- Assessment and Plan (Free Text)


Assessment: 


This is a 47yf presenting with SOB. 


1. Elevated LFTs


2. Asthma and COPD exacerbation 














Plan: 


-Continue supportive care and medical treatment per primary team 


-Pt with elevated LFTs and no significant abnormality of alk phos and Tbili 


-No abdominal pain


-Hepatitis negative and autoimmune serologies IgG negative 


-Abd US to evaluate liver parenchyma


-Monitor LFTs, likely elevated from fatty liver


-LFTs are trending down 


-Pt okay for outpt followup 














<Mike Felder - Last Filed: 07/31/18 22:38>





Objective





- Vital Signs/Intake and Output


Vital Signs (last 24 hours): 


 











Temp Pulse Resp BP Pulse Ox


 


 98.5 F   74   18   165/99 H  98 


 


 07/31/18 21:05  07/31/18 22:05  07/31/18 21:05  07/31/18 22:05  07/31/18 21:05








Intake and Output: 


 











 07/31/18 08/01/18





 18:59 06:59


 


Intake Total 1140 


 


Balance 1140 














- Medications


Medications: 


 Current Medications





Albuterol/Ipratropium (Duoneb 3 Mg/0.5 Mg (3 Ml) Ud)  3 ml IH Q2H PRN


   PRN Reason: Shortness of Breath


   Last Admin: 07/28/18 07:54 Dose:  3 ml


Albuterol/Ipratropium (Duoneb 3 Mg/0.5 Mg (3 Ml) Ud)  3 ml IH [id number] [facility]


   Last Admin: 07/31/18 19:58 Dose:  3 ml


Amlodipine Besylate (Norvasc)  10 mg PO DAILY [facility]


   Last Admin: 07/31/18 09:32 Dose:  10 mg


Atenolol (Tenormin)  25 mg PO BID [facility]


   Last Admin: 07/31/18 17:09 Dose:  25 mg


Benzocaine/Menthol (Cepacol Sore Throat)  1 olivia MT Q2H PRN


   PRN Reason: Sore Throat


   Last Admin: 07/31/18 22:14 Dose:  1 olivia


Benzonatate (Tessalon Perles)  100 mg PO TID [facility]


   Last Admin: 07/31/18 17:09 Dose:  100 mg


Budesonide (Pulmicort Respules)  0.25 mg IH [id number] [facility]


   Last Admin: 07/31/18 19:58 Dose:  0.25 mg


Chlorthalidone (Hygroton)  25 mg PO DAILY [facility]


   Last Admin: 07/31/18 09:31 Dose:  25 mg


Clonazepam (Klonopin)  0.5 mg PO BID [facility]


   PRN Reason: Protocol


   Last Admin: 07/31/18 17:09 Dose:  0.5 mg


Clonidine HCl (Catapres)  0.1 mg PO TID [facility]


   Last Admin: 07/31/18 17:09 Dose:  0.1 mg


Clonidine HCl (Catapres)  0.1 mg PO Q4H PRN


   PRN Reason: Systolic Blood Pressure


Enoxaparin Sodium (Lovenox)  40 mg SC DAILY [facility]


   PRN Reason: Protocol


   Last Admin: 07/31/18 09:31 Dose:  40 mg


Ergocalciferol (Drisdol 50,000 Intl Units Cap)  1 cap PO Q7D [facility]


   Last Admin: 07/31/18 13:11 Dose:  1 cap


Ferrous Gluconate (Fergon)  324 mg PO TID [facility]


   Last Admin: 07/31/18 17:09 Dose:  324 mg


Folic Acid (Folic Acid)  1 mg PO DAILY [facility]


   Last Admin: 07/31/18 09:31 Dose:  1 mg


Guaifenesin (Mucinex La)  600 mg PO BID [facility]


   Last Admin: 07/31/18 17:09 Dose:  600 mg


Guaifenesin/Codeine Phosphate (Robitussin W/Codeine)  5 ml PO Q6H PRN


   PRN Reason: Cough and congestion


   Last Admin: 07/31/18 22:04 Dose:  5 ml


Hydralazine HCl (Apresoline)  10 mg IVP Q6 PRN


   PRN Reason: Systolic Blood Pressure


   Last Admin: 07/30/18 17:23 Dose:  10 mg


Hydralazine HCl (Apresoline)  100 mg PO Q12 [facility]


   Last Admin: 07/31/18 22:05 Dose:  100 mg


Cefazolin Sodium (Ancef 1gm In Ns)  1 gm in 100 mls @ 100 mls/hr IVPB Q8 [facility]


   PRN Reason: Protocol


   Stop: 08/09/18 14:05


   Last Admin: 07/31/18 22:15 Dose:  100 mls/hr


Lorazepam (Ativan)  0.5 mg IVP Q6H PRN; Protocol


   PRN Reason: Anxiety


   Last Admin: 07/30/18 17:13 Dose:  0.5 mg


Losartan Potassium (Cozaar)  100 mg PO DAILY [facility]


   Last Admin: 07/31/18 09:32 Dose:  100 mg


Methylprednisolone (Solu-Medrol)  20 mg IVP Q12 [facility]


   Last Admin: 07/31/18 22:12 Dose:  20 mg


Morphine Sulfate (Morphine)  4 mg IVP Q4H PRN


   PRN Reason: Pain, moderate (4-7)


   Last Admin: 07/31/18 19:19 Dose:  4 mg


Nystatin (Nystatin Oral Susp)  5 ml PO QID [facility]


   Last Admin: 07/31/18 22:10 Dose:  5 ml


Oxycodone HCl (Oxycodone Immediate Release Tab)  10 mg PO BID PRN


   PRN Reason: Pain, severe (8-10)


   Last Admin: 07/30/18 17:17 Dose:  10 mg


Vitamin B Complex/Vit C/Folic Acid (Nephro-Sydni)  1 tab PO 0800 [facility]


Zolpidem Tartrate (Ambien)  10 mg PO HS PRN; Protocol


   PRN Reason: Insomnia


   Last Admin: 07/31/18 00:55 Dose:  10 mg











- Labs


Labs: 


 





 07/30/18 06:30 





 07/31/18 07:00 





 











PT  12.2 SECONDS (9.4-12.5)   07/27/18  12:15    


 


INR  1.06  (0.93-1.08)   07/27/18  12:15    


 


APTT  26.4 Seconds (25.1-36.5)   07/27/18  12:15    














Attending/Attestation





- Attestation


I have personally seen and examined this patient.: Yes


I have fully participated in the care of the patient.: Yes


I have reviewed all pertinent clinical information, including history, physical 

exam and plan: Yes


Notes (Text): 


This is an addendum to GI progress  report dictated by the GI Fellow.The 

patient was seen and examined earlier.  Medical records, lab studies, imagings 

were reviewed.  Last 24 hours events reviewed.  Agreed with the above treatment 

plan as outlined in GI Fellow 's notes the with the addition of the following


patient denies any complaintof abdominal pain


Abdomen soft no tenderness


LFT shows downward trend


Advised to follow-up 





07/31/18 22:37

## 2018-07-31 NOTE — CP.PCM.CON
History of Present Illness





- History of Present Illness


History of Present Illness: 





Nephrology Consultation Note:





Assessment: Stable


uncontrolled severe HTN 


BENI resolved


asthma exacerbation with acute respi failure and HCAP, sepsis


mild rhabomyolysis


hypertension (years), chronic back pain, asthma and morbid obesity


adjustment disorder


iron def anemia, vit D def





Plan


Hypertension control with meds as ordered. Patient on multiple BP meds, agree 

with thiazide, losartan and norvasc. als on clonidine and atenolol. will 

increase hydralazine


secondary HTN work up negative


abnormal LFT and mild elevated CPK: work up in progress


started weekly Vit D, iron and MVI supplements


GI, ID and pulmonary following





Dose meds/antibiotics for GFR >60. 


Glycemic control


Recommend weight loss, diet, exercise and lifestyle modifications


Further work up/management as per primary team





Thanks for allowing me to participate in care of your patient. Will follow 

patient with you. Please call if any Qs. had d/w team


Dr Didier Villalobos


Office: [phone] Cell: [phone] Fax: [phone]





Chief Complaint; shortness of breath and high BP


reason for consult: HTN and BENI management


HPI: Pt is a 47 F with hx of hypertension (years), chronic back pain, asthma 

and morbid obesity presented with complaints of SOB and is being managed for 

asthma exacerbation, Acute respiratory failure and pneumonia.pt feels better at 

this time. SOB is much better. has cough which is improved. also had abnormal 

LFT and mildly elevated CPK


Denies OTC/herbal meds or NSAIDs


No recent iodinated contrast exposure. 


denies tobacco/smoking/etoh/drugs. 





ROS: 


Cardiovascular: No chest pain. 


Pulmonary: improved shortness of breath now


Gastrointestinal: denies abdominal pain No nausea. No vomiting. 


Genitourinary: No pain while urinating. Denies blood in urine. 


All other negative except as mentioned in HPI





Physical Examination:      


General Appearance: Comfortable, in no acute respiratory distress, co-operative 

. obese


Vitals reviewed and noted as below


Head; Atraumatic, normocephalic


ENT: no ulcers no thrush. Tongue is midline. Oropharynx: no rash or ulcers.


EYES: Pupils are equal, round and reactive to light accommodation. Eye muscles 

and extraocular movement intact. Sclera is anicteric.


Neck; supple no lymphadenopathy, no thyromegaly or bruit


Lungs: Normal respiratory rate/effort. Breath sounds bilateral equal and Has 

right basilar crackles


Heart: Normal rate. s1s2 normal. No rub or gallop. 


Extremities: no edema. No varicose veins


Neurological: Patient is alert, awake and oriented to person, place and time. 

No focal deficit. Strength bilateral appropriate and equal


Skin: Warm and dry. Normal turgor. No rash. Palpitation: Normal elasticity for 

age


Abdomen: Abdomen is soft. Bowel sounds +. There is no abdominal tenderness, no 

guarding/rigidity no organomegaly


Psych: normal insight and normal affect/mood


MSK: no joint tenderness or swelling. Digits and nails normal, no deformity


: kidney or bladder not palpable





Labs/imaging reviewed.


Past medical history, past surgical history, family history, social history, 

allergy reviewed and noted as below


Family hx: no hx of CKD. Rest non-contributory 





work up: UA no protein now


Aldosterone 12 renin 0.1 metanephrines WNL renal artery Doppler no evidence for 

KAYE


Vitamin D less than 12.8


Iron saturation 14% ferritin 35





Past Patient History





- Infectious Disease


Hx of Infectious Diseases: None





- Tetanus Immunizations


Tetanus Immunization: Unknown





- Past Social History


Smoking Status: Never Smoked





- CARDIAC


Hx Hypertension: Yes


Hx Peripheral Edema: Yes (ble +2 pitting)





- PULMONARY


Hx Asthma: Yes





- NEUROLOGICAL


Hx Neurological Disorder: No





- HEENT


Hx HEENT Problems: No





- RENAL


Hx Chronic Kidney Disease: No





- ENDOCRINE/METABOLIC


Hx Endocrine Disorders: No





- HEMATOLOGICAL/ONCOLOGICAL


Hx Blood Disorders: No





- INTEGUMENTARY


Hx Dermatological Problems: Yes


Other/Comment: bruising multiple left arm





- MUSCULOSKELETAL/RHEUMATOLOGICAL


Hx Falls: No





- GASTROINTESTINAL


Hx Gastrointestinal Disorders: Yes (obese)


Hx Gastroesophageal Reflux: Yes





- GENITOURINARY/GYNECOLOGICAL


Hx Genitourinary Disorders: No





- PSYCHIATRIC


Hx Substance Use: No





- SURGICAL HISTORY


Hx Surgeries: No





Meds


Allergies/Adverse Reactions: 


 Allergies











Allergy/AdvReac Type Severity Reaction Status Date / Time


 


No Known Allergies Allergy   Verified 07/27/18 11:56














- Medications


Medications: 


 Current Medications





Albuterol/Ipratropium (Duoneb 3 Mg/0.5 Mg (3 Ml) Ud)  3 ml IH Q2H PRN


   PRN Reason: Shortness of Breath


   Last Admin: 07/28/18 07:54 Dose:  3 ml


Albuterol/Ipratropium (Duoneb 3 Mg/0.5 Mg (3 Ml) Ud)  3 ml IH [id number] [facility]


   Last Admin: 07/31/18 11:09 Dose:  3 ml


Amlodipine Besylate (Norvasc)  10 mg PO DAILY [facility]


   Last Admin: 07/31/18 09:32 Dose:  10 mg


Atenolol (Tenormin)  25 mg PO BID [facility]


   Last Admin: 07/31/18 09:31 Dose:  25 mg


Benzocaine/Menthol (Cepacol Sore Throat)  1 olivia MT Q2H PRN


   PRN Reason: Sore Throat


Benzonatate (Tessalon Perles)  100 mg PO TID [facility]


   Last Admin: 07/31/18 13:09 Dose:  100 mg


Budesonide (Pulmicort Respules)  0.25 mg IH [id number] [facility]


   Last Admin: 07/31/18 07:31 Dose:  0.25 mg


Chlorthalidone (Hygroton)  25 mg PO DAILY [facility]


   Last Admin: 07/31/18 09:31 Dose:  25 mg


Clonazepam (Klonopin)  0.5 mg PO BID [facility]


   PRN Reason: Protocol


   Last Admin: 07/31/18 09:32 Dose:  0.5 mg


Clonidine HCl (Catapres)  0.1 mg PO TID [facility]


   Last Admin: 07/31/18 13:09 Dose:  0.1 mg


Clonidine HCl (Catapres)  0.1 mg PO Q4H PRN


   PRN Reason: Systolic Blood Pressure


Enoxaparin Sodium (Lovenox)  40 mg SC DAILY [facility]


   PRN Reason: Protocol


   Last Admin: 07/31/18 09:31 Dose:  40 mg


Ergocalciferol (Drisdol 50,000 Intl Units Cap)  1 cap PO Q7D [facility]


   Last Admin: 07/31/18 13:11 Dose:  1 cap


Ferrous Gluconate (Fergon)  324 mg PO TID [facility]


   Last Admin: 07/31/18 13:09 Dose:  324 mg


Folic Acid (Folic Acid)  1 mg PO DAILY [facility]


   Last Admin: 07/31/18 09:31 Dose:  1 mg


Guaifenesin (Mucinex La)  600 mg PO BID [facility]


   Last Admin: 07/31/18 09:32 Dose:  600 mg


Guaifenesin/Codeine Phosphate (Robitussin W/Codeine)  5 ml PO Q6H PRN


   PRN Reason: Cough and congestion


   Last Admin: 07/31/18 14:33 Dose:  5 ml


Hydralazine HCl (Apresoline)  10 mg IVP Q6 PRN


   PRN Reason: Systolic Blood Pressure


   Last Admin: 07/30/18 17:23 Dose:  10 mg


Hydralazine HCl (Apresoline)  100 mg PO Q12 [facility]


Cefazolin Sodium (Ancef 1gm In Ns)  1 gm in 100 mls @ 100 mls/hr IVPB Q8 [facility]


   PRN Reason: Protocol


   Stop: 08/09/18 14:05


   Last Admin: 07/31/18 14:33 Dose:  100 mls/hr


Lorazepam (Ativan)  0.5 mg IVP Q6H PRN; Protocol


   PRN Reason: Anxiety


   Last Admin: 07/30/18 17:13 Dose:  0.5 mg


Losartan Potassium (Cozaar)  100 mg PO DAILY [facility]


   Last Admin: 07/31/18 09:32 Dose:  100 mg


Methylprednisolone (Solu-Medrol)  40 mg IVP Q12 [facility]


   Last Admin: 07/31/18 09:31 Dose:  40 mg


Morphine Sulfate (Morphine)  4 mg IVP Q4H PRN


   PRN Reason: Pain, moderate (4-7)


   Last Admin: 07/31/18 09:39 Dose:  4 mg


Oxycodone HCl (Oxycodone Immediate Release Tab)  10 mg PO BID PRN


   PRN Reason: Pain, severe (8-10)


   Last Admin: 07/30/18 17:17 Dose:  10 mg


Vitamin B Complex/Vit C/Folic Acid (Nephro-Sydni)  1 tab PO 0800 [facility]


Zolpidem Tartrate (Ambien)  10 mg PO HS PRN; Protocol


   PRN Reason: Insomnia


   Last Admin: 07/31/18 00:55 Dose:  10 mg











Results





- Vital Signs


Recent Vital Signs: 


 Last Vital Signs











Temp  98 F   07/31/18 08:14


 


Pulse  77   07/31/18 08:14


 


Resp  20   07/31/18 08:14


 


BP  186/72 H  07/31/18 09:32


 


Pulse Ox  96   07/31/18 08:14














- Labs


Result Diagrams: 


 07/30/18 06:30





 07/31/18 07:00


Labs: 


 Laboratory Results - last 24 hr











  07/30/18 07/30/18 07/30/18





  11:00 11:00 11:00


 


Sodium   


 


Potassium   


 


Chloride   


 


Carbon Dioxide   


 


Anion Gap   


 


BUN   


 


Creatinine   


 


Est GFR ( Amer)   


 


Est GFR (Non-Af Amer)   


 


Random Glucose   


 


Calcium   


 


Ferritin    35.7


 


Total Bilirubin   


 


AST   


 


ALT   


 


Alkaline Phosphatase   


 


Total Protein   


 


Albumin   


 


Globulin   


 


Albumin/Globulin Ratio   


 


Ceruloplasmin   


 


Urine Color   


 


Urine Appearance   


 


Urine pH   


 


Ur Specific Gravity   


 


Urine Protein   


 


Urine Glucose (UA)   


 


Urine Ketones   


 


Urine Blood   


 


Urine Nitrate   


 


Urine Bilirubin   


 


Urine Urobilinogen   


 


Ur Leukocyte Esterase   


 


IgG   785.6 


 


Hepatitis A IgM Ab  Negative  


 


Hep Bs Antigen  Negative  


 


Hep B Core IgM Ab  Negative  


 


Hepatitis C Antibody  Negative  














  07/30/18 07/31/18 07/31/18





  11:00 02:10 07:00


 


Sodium    143


 


Potassium    4.2


 


Chloride    104


 


Carbon Dioxide    30


 


Anion Gap    13


 


BUN    20


 


Creatinine    0.7


 


Est GFR ( Amer)    > 60


 


Est GFR (Non-Af Amer)    > 60


 


Random Glucose    106


 


Calcium    9.1


 


Ferritin   


 


Total Bilirubin    0.9


 


AST    32


 


ALT    73 H


 


Alkaline Phosphatase    108


 


Total Protein    6.6


 


Albumin    3.5


 


Globulin    3.1


 


Albumin/Globulin Ratio    1.1


 


Ceruloplasmin  42  


 


Urine Color   Yellow 


 


Urine Appearance   Clear 


 


Urine pH   5.5 


 


Ur Specific Gravity   1.010 


 


Urine Protein   Negative 


 


Urine Glucose (UA)   Negative 


 


Urine Ketones   Negative 


 


Urine Blood   Negative 


 


Urine Nitrate   Negative 


 


Urine Bilirubin   Negative 


 


Urine Urobilinogen   0.2 


 


Ur Leukocyte Esterase   Negative 


 


IgG   


 


Hepatitis A IgM Ab   


 


Hep Bs Antigen   


 


Hep B Core IgM Ab   


 


Hepatitis C Antibody

## 2018-07-31 NOTE — PN
DATE:  07/30/2018



SUBJECTIVE:  The patient is comfortable.  Sitting on the edge of the bed. 

No distress.  Feeling a lot better.  There is no nausea, no abdominal pain,

no vomiting.



PHYSICAL EXAMINATION:

As follows;

VITAL SIGNS:  Temperature 98, heart rate 81, blood pressure 148/68,

respirations 20, saturation 96%.

HEAD AND NECK:  Normal.  No JVD.  No thyromegaly.

CHEST:  Clear bilaterally.

CARDIAC:  First sound and second sound normal.

ABDOMEN:  Obese, nontender.

EXTREMITIES:  No edema.

NEUROLOGIC:  Normal.



DATA:  Laboratory study shows white count 16.7, hemoglobin 10.1, hematocrit

31.8, platelets 380.  Chemistry shows 142 sodium, potassium 4.4, chloride

105, bicarb 29, BUN 25, creatinine 0.8.  Blood sugar 118.  Liver function

test:  Slightly elevated AST, ALT and alkaline phosphatase.  C-reactive

protein 194.



IMPRESSION AND PLAN:

1.  Acute asthma exacerbation.  She is on cefepime, continue that and she

has one-time Gram-positive coccemia, has received vancomycin which was

discontinued.  Patient is clinically better; however, her laboratory

finding is a little bit different, high white count, high C-reactive

protein.  Also, patient had ultrasound of the liver and gallbladder, which

was negative and there was nothing to explain high white count with the

ultrasound of kidney looking normal, aorta looking normal.  We will

continue current therapy.

2.  Hypertension.  The patient has difficult-to-control blood pressure. 

She is getting hydralazine 50 mg b.i.d. and hydralazine 10 mg intravenously

every 6 hours.  Patient is getting also clonidine 0.1 b.i.d. on regular

basis and getting Cozaar 100 mg in addition to Microzide 12.5 mg.  She is

also getting Norvasc 5 mg.  We will continue current therapy and atenolol

25 mg b.i.d.  Patient has difficult blood pressure control.  We will follow

up with  _____, we saw her in the past and will continue blood pressure

medication.  We may increase Norvasc to 10 mg.

3.  Chronic anxiety.  We will get Dr. Villasenor to see the patient and will

follow up clinically.  Continue current therapy.  Also, Dr. Grover has been

consulted for cardiac evaluation and patient had normal stress test.







__________________________________________

Aries Van MD



DD:  07/31/2018 7:59:09

DT:  07/31/2018 10:48:14

[facility] # [id number]

## 2018-07-31 NOTE — PN
Copied To:  Zeeshan Mcleod MD

Attending MD:  Zeeshan Mcleod MD



DATE:  07/31/2018



SUBJECTIVE:  The patient is in bed in no acute distress, nontoxic.



PHYSICAL EXAMINATION:

VITAL SIGNS:  Temperature is 98, blood pressure is 160/80, respiratory rate

of 20, heart rate of 74.

HEENT:  Unremarkable.

NECK:  Supple.

LUNGS:  Have decreased breath sounds.

HEART:  Normal S1, S2.

ABDOMINAL:  Soft, nontender.



LABORATORY EXAMINATION:  Reveals a white count of 16,700.  Sed rate is 107.

Chemistries reveals a BUN of 20, creatinine of 0.7.  C-reactive protein is

194.  The patient has a procalcitonin of 9.59.  Urinalysis is noted and

toxicology is noted.  Serology is noted.  Urine for Legionella antigen and

hepatitis profile is negative.  Microbiology reveals one bottle is positive

for sensitive staph aureus.  Repeat blood cultures from 29th are negative. 

MRSA nares detection is negative.  Review of orders reveals the patient to

be on cefepime, Solu-Medrol.  The patient also had an echo today.  No

evidence of endocarditis noted in this study.  The patient had an

ultrasound of gallbladder, which is negative.



ASSESSMENT AND PLAN:  A 47-year-old female was seen earlier in [facility], bed 1,

who was admitted with severe sepsis with healthcare-associated pneumonia

and sensitive staph aureus bacteremia, etiology unclear in a patient with

obesity with BMI of 38, anxiety, bipolar disorder and the cefepime and

doxycycline, day #5 IM today and they have completed the pneumonia

treatment with procalcitonin which is 9.59 on 07/28/2018.  We will treat

the patient with a sensitive staph aureus.  Today is day #3 of at least 14

days to 21 days with extremely high sed rate and C-reactive protein.  We

will discontinue the cefepime.  Doxycycline has already been discontinued

by pharmacy and start the patient on Ancef will be day #3 since the blood

cultures have been negative of at least 14.  I will follow closely with

you.  We will also order a new procalcitonin level.





__________________________________________

Zeeshan Mcleod MD



:  07/31/2018 14:05:30

DT:  07/31/2018 14:10:03

[facility] # [id number]

## 2018-07-31 NOTE — CP.PCM.PN
Subjective





- Date & Time of Evaluation


Date of Evaluation: 07/31/18


Time of Evaluation: 06:25





- Subjective


Subjective: 





Awake, feels better, no distress, denies shortness of breath





Reason for consultation and follow up: Cardiac evaluation for shortness of 

breath, history of hypertension, asthma





Seen and examined by me and Dr. Grover





Objective





- Vital Signs/Intake and Output


Vital Signs (last 24 hours): 


 











Temp Pulse Resp BP Pulse Ox


 


 99.7 F H  74   20   200/100 H  99 


 


 07/31/18 00:00  07/31/18 00:00  07/31/18 00:00  07/31/18 07:04  07/31/18 00:00








Intake and Output: 


 











 07/31/18 07/31/18





 06:59 18:59


 


Intake Total 100 


 


Balance 100 














- Medications


Medications: 


 Current Medications





Albuterol/Ipratropium (Duoneb 3 Mg/0.5 Mg (3 Ml) Ud)  3 ml IH Q2H PRN


   PRN Reason: Shortness of Breath


   Last Admin: 07/28/18 07:54 Dose:  3 ml


Albuterol/Ipratropium (Duoneb 3 Mg/0.5 Mg (3 Ml) Ud)  3 ml IH [id number] [facility]


   Last Admin: 07/31/18 04:26 Dose:  Not Given


Amlodipine Besylate (Norvasc)  5 mg PO DAILY [facility]


   Last Admin: 07/30/18 09:26 Dose:  5 mg


Atenolol (Tenormin)  25 mg PO BID [facility]


   Last Admin: 07/30/18 17:23 Dose:  25 mg


Benzonatate (Tessalon Perles)  100 mg PO TID [facility]


   Last Admin: 07/30/18 17:27 Dose:  100 mg


Budesonide (Pulmicort Respules)  0.25 mg IH [id number] [facility]


   Last Admin: 07/30/18 19:59 Dose:  0.25 mg


Clonidine HCl (Catapres)  0.1 mg PO BID [facility]


   Last Admin: 07/31/18 07:04 Dose:  0.1 mg


Enoxaparin Sodium (Lovenox)  40 mg SC DAILY [facility]


   PRN Reason: Protocol


   Last Admin: 07/30/18 09:25 Dose:  40 mg


Guaifenesin (Mucinex La)  600 mg PO BID [facility]


   Last Admin: 07/30/18 17:14 Dose:  600 mg


Guaifenesin/Codeine Phosphate (Robitussin W/Codeine)  5 ml PO Q6H PRN


   PRN Reason: Cough and congestion


   Last Admin: 07/31/18 06:11 Dose:  5 ml


Hydralazine HCl (Apresoline)  50 mg PO Q12 [facility]


   Last Admin: 07/30/18 21:32 Dose:  50 mg


Hydralazine HCl (Apresoline)  10 mg IVP Q6 PRN


   PRN Reason: Systolic Blood Pressure


   Last Admin: 07/30/18 17:23 Dose:  10 mg


Hydrochlorothiazide (Microzide)  12.5 mg PO DAILY [facility]


   Last Admin: 07/30/18 09:27 Dose:  12.5 mg


Cefepime HCl (Maxipime 2gm)  2 gm in 100 mls @ 100 mls/hr IVPB 0600,1800 [facility]


   PRN Reason: Protocol


   Stop: 08/01/18 15:01


   Last Admin: 07/31/18 05:32 Dose:  100 mls/hr


Lorazepam (Ativan)  0.5 mg IVP Q6H PRN; Protocol


   PRN Reason: Anxiety


   Last Admin: 07/30/18 17:13 Dose:  0.5 mg


Losartan Potassium (Cozaar)  100 mg PO DAILY [facility]


   Last Admin: 07/30/18 09:25 Dose:  100 mg


Methylprednisolone (Solu-Medrol)  40 mg IVP Q12 [facility]


   Last Admin: 07/30/18 21:33 Dose:  40 mg


Morphine Sulfate (Morphine)  4 mg IVP Q4H PRN


   PRN Reason: Pain, moderate (4-7)


   Last Admin: 07/30/18 22:51 Dose:  4 mg


Oxycodone HCl (Oxycodone Immediate Release Tab)  10 mg PO BID PRN


   PRN Reason: Pain, severe (8-10)


   Last Admin: 07/30/18 17:17 Dose:  10 mg


Zolpidem Tartrate (Ambien)  10 mg PO HS PRN; Protocol


   PRN Reason: Insomnia


   Last Admin: 07/31/18 00:55 Dose:  10 mg











- Labs


Labs: 


 





 07/30/18 06:30 





 07/30/18 06:30 





 











PT  12.2 SECONDS (9.4-12.5)   07/27/18  12:15    


 


INR  1.06  (0.93-1.08)   07/27/18  12:15    


 


APTT  26.4 Seconds (25.1-36.5)   07/27/18  12:15    














- Constitutional


Appears: No Acute Distress





- Eye Exam


Eye Exam: Normal appearance





- ENT Exam


ENT Exam: Mucous Membranes Moist





- Respiratory Exam


Respiratory Exam: Decreased Breath Sounds, Clear to Ausculation Bilateral, 

NORMAL BREATHING PATTERN





- Cardiovascular Exam


Cardiovascular Exam: +S1, +S2





- GI/Abdominal Exam


GI & Abdominal Exam: Soft, Normal Bowel Sounds





- Extremities Exam


Additional comments: 





1+edema





- Neurological Exam


Neurological Exam: Alert, Awake, Oriented x3





- Psychiatric Exam


Psychiatric exam: Normal Affect





- Skin


Skin Exam: Intact, Warm





Assessment and Plan





- Assessment and Plan (Free Text)


Assessment: 





A 47 year old female obese,who came in to the ER due to shortness of breath. 

History of hypertension and asthma. Non compliant with medications, Seen by 

Psychiatry for anxiety and depression. ID on consult. D-dimer elevated, VQ scan 

done low probability of pulmonary embolism.Renal ultrasound done-unremarkable 

findings.  Exacerbation of asthma. urine positive for opiates and 

benzodiapines.uncontrolled hypertension.Blood culture positive for Staphyloccus 

stan, ID on consult, on IV antibiotics.








Plan: 





Uncontrolled blood pressure, medications increased doses yesterday,


Will reevaluate after giving today's medications.


On Tenormin 25 mg daily,Catapres 0.1 mg BID,Hydralazine 50 very 12 hours.


 Lovenox 40 mg daily,Apresoline 50 mg q 12 hours,, Microzide  12.5 mg daily


 Cozaar  100 mg daily, 


Blood culture (one set) Staphyloccus stan


Continue antibiotics per ID


Pulmonary  on consult


GI on consult for elevated LFT's


US of abdomen, unremarkable findings/normal


Lifestyle modification


Weight reduction


Continue current treatment


Continue current medications





Will follow up





Plan and treatment discussed with Dr. Grover

## 2018-07-31 NOTE — CARD
--------------- APPROVED REPORT --------------





Date of service: 07/31/2018



EXAM: Two-dimensional and M-mode echocardiogram with Doppler and 

color Doppler.



INDICATION

Infection:Rule out subacute bacterial endocarditis 



2D DIMENSIONS 

Left Atrium (2D)4.3   (1.6-4.0cm)IVSd1.3   (0.7-1.1cm)

LVDd4.3   (3.9-5.9cm)PWd1.3   (0.7-1.1cm)

LVDs2.8   (2.5-4.0cm)FS (%) 35.0   %

LVEF (%)64.7   (>50%)



M-Mode DIMENSIONS 

Aortic Root2.20   (2.2-3.7cm)Aortic Cusp Exc.2.00   (1.5-2.0cm)



Aortic Valve

AoV Peak Bmzcqzgn600.0cm/Kasandra Peak GR.14mmHg



Mitral Valve

E/A ratio0.0



TDI

E/Lateral E'0.0E/Medial E'0.0



Tricuspid Valve

TR Peak Nxlokpce449md/sRAP [id number] Peak Gr.12mmHg

[id number]



 LEFT VENTRICLE 

The left ventricle is normal size. There is mild concentric left 

ventricular hypertrophy. The left ventricular function is 

normal.EF-65% There is normal LV segmental wall motion. The left 

ventricular diastolic function is normal. No left ventricle thrombus 

noted on this study. There is no ventricular septal defect 

visualized. There is no left ventricular aneurysm. There is no mass 

noted in the left ventricle.



 RIGHT VENTRICLE 

The right ventricle is normal size. There is normal right ventricular 

wall thickness. The right ventricular systolic function is normal.



 ATRIA 

The left atrium is mildly dilated. The right atrium size is normal. 

The interatrial septum is intact with no evidence for an atrial 

septal defect.



 AORTIC VALVE 

The aortic valve is thickened but opens well. There is trace aortic 

regurgitation. There is no aortic valvular stenosis. There is no 

aortic valvular vegetation.



 MITRAL VALVE 

The mitral valve is thickened but opens well. Mitral regurgitation is 

trace. There is no mitral valve stenosis. There is no evidence of 

mitral valve prolapse.



 TRICUSPID VALVE 

The tricuspid valve leaflets are thickened , but open well. There is 

trace to mild tricuspid regurgitation.RVSP_22 mmof Hg. There is no 

tricuspid valve stenosis. There is no tricuspid valve prolapse or 

vegetation.



 PULMONIC VALVE 

The pulmonary valve is normal in structure. There is no pulmonic 

valvular regurgitation. There is no pulmonic valvular stenosis.



 GREAT VESSELS 

The aortic root is normal in size. The ascending aorta is normal in 

size. The pulmonary artery is normal. The IVC is normal in size and 

collapses >50% with inspiration.



 PERICARDIAL EFFUSION 

There is no pleural effusion. There is no pericardial effusion.



<Conclusion>

The left ventricle is normal size.

There is mild concentric left ventricular hypertrophy.

The left ventricular function is normal.EF-65%

There is trace aortic regurgitation.

Mitral regurgitation is trace.

There is trace to mild tricuspid regurgitation.RVSP_22 mmof Hg.

There is no pulmonic valvular regurgitation.

The IVC is normal in size and collapses >50% with inspiration.

There is no pericardial effusion.

This is a repeat Studyafter one week to R/o Enodocarditis.

No Evidence of endocarditis noted in this study.

## 2018-08-01 RX ADMIN — GUAIFENESIN SCH MG: 600 TABLET, EXTENDED RELEASE ORAL at 17:12

## 2018-08-01 RX ADMIN — IPRATROPIUM BROMIDE AND ALBUTEROL SULFATE SCH: .5; 3 SOLUTION RESPIRATORY (INHALATION) at 04:00

## 2018-08-01 RX ADMIN — IPRATROPIUM BROMIDE AND ALBUTEROL SULFATE SCH ML: .5; 3 SOLUTION RESPIRATORY (INHALATION) at 20:35

## 2018-08-01 RX ADMIN — IPRATROPIUM BROMIDE AND ALBUTEROL SULFATE SCH ML: .5; 3 SOLUTION RESPIRATORY (INHALATION) at 11:04

## 2018-08-01 RX ADMIN — IPRATROPIUM BROMIDE AND ALBUTEROL SULFATE SCH: .5; 3 SOLUTION RESPIRATORY (INHALATION) at 16:08

## 2018-08-01 RX ADMIN — ENOXAPARIN SODIUM SCH MG: 40 INJECTION SUBCUTANEOUS at 09:33

## 2018-08-01 RX ADMIN — CEFAZOLIN SCH MLS/HR: 330 INJECTION, POWDER, FOR SOLUTION INTRAMUSCULAR; INTRAVENOUS at 05:44

## 2018-08-01 RX ADMIN — CEFAZOLIN SCH MLS/HR: 330 INJECTION, POWDER, FOR SOLUTION INTRAMUSCULAR; INTRAVENOUS at 22:33

## 2018-08-01 RX ADMIN — MORPHINE SULFATE PRN MG: 4 INJECTION, SOLUTION INTRAMUSCULAR; INTRAVENOUS at 05:49

## 2018-08-01 RX ADMIN — Medication SCH TAB: at 09:34

## 2018-08-01 RX ADMIN — NYSTATIN SCH ML: 100000 SUSPENSION ORAL at 09:35

## 2018-08-01 RX ADMIN — OXYCODONE HYDROCHLORIDE PRN MG: 10 TABLET ORAL at 14:35

## 2018-08-01 RX ADMIN — PURIFIED WATER PRN LOZ: 99.05 LIQUID OPHTHALMIC at 17:16

## 2018-08-01 RX ADMIN — GUAIFENESIN SCH MG: 600 TABLET, EXTENDED RELEASE ORAL at 09:34

## 2018-08-01 RX ADMIN — METHYLPREDNISOLONE SODIUM SUCCINATE SCH MG: 40 INJECTION, POWDER, FOR SOLUTION INTRAMUSCULAR; INTRAVENOUS at 09:35

## 2018-08-01 RX ADMIN — CEFAZOLIN SCH MLS/HR: 330 INJECTION, POWDER, FOR SOLUTION INTRAMUSCULAR; INTRAVENOUS at 14:33

## 2018-08-01 RX ADMIN — NYSTATIN SCH ML: 100000 SUSPENSION ORAL at 14:35

## 2018-08-01 RX ADMIN — BUDESONIDE SCH MG: 0.25 SUSPENSION RESPIRATORY (INHALATION) at 07:42

## 2018-08-01 RX ADMIN — BUDESONIDE SCH MG: 0.25 SUSPENSION RESPIRATORY (INHALATION) at 20:35

## 2018-08-01 RX ADMIN — NYSTATIN SCH ML: 100000 SUSPENSION ORAL at 17:13

## 2018-08-01 RX ADMIN — NYSTATIN SCH ML: 100000 SUSPENSION ORAL at 22:33

## 2018-08-01 RX ADMIN — GUAIFENESIN AND CODEINE PHOSPHATE PRN ML: 100; 10 SOLUTION ORAL at 20:17

## 2018-08-01 RX ADMIN — IPRATROPIUM BROMIDE AND ALBUTEROL SULFATE SCH: .5; 3 SOLUTION RESPIRATORY (INHALATION) at 00:15

## 2018-08-01 RX ADMIN — METHYLPREDNISOLONE SODIUM SUCCINATE SCH MG: 40 INJECTION, POWDER, FOR SOLUTION INTRAMUSCULAR; INTRAVENOUS at 22:29

## 2018-08-01 RX ADMIN — IPRATROPIUM BROMIDE AND ALBUTEROL SULFATE SCH ML: .5; 3 SOLUTION RESPIRATORY (INHALATION) at 07:42

## 2018-08-01 NOTE — CON
Copied To:  Jacklyn Higuera MD

Attending MD:  Jacklyn Higuera MD



DATE:  08/01/2018



HISTORY OF PRESENT ILLNESS:  Shortly, the patient is a 47-year-old,

reported history of asthma.  The patient recently lost her , recent

admission to the medical floor on 07/24, the patient was discharged.  The

patient was admitted this time for respiratory failure.  Psych consult was

called because the patient has history of anxiety.  This writer is very

familiar with this patient from the previous admission to the medical site

and the patient was feeling overwhelmed and depressed because she lost her

.  The patient was followed up today.  The patient presented to be

alert and oriented, pleasant, cooperative.  The patient said that she is

adjusting with loss of her  very well.  The patient knows that "I

know that he is with the god right now and he is not suffering.  The

patient denied being depressed.  Denied thoughts of harming herself or

others.  Denied intent or plan.  The patient also denied hearing voices,

denied seeing things.  The patient denied anxiety as of now.  The patient

reported she sleeps better, feels better.  No concerns at present moment.



VITAL SIGNS:  Reviewed.  Temperature is 98.1, pulse 74, blood pressure

175/90, respiration 20, oxygen saturation is 97.



MEDICATIONS:  Reviewed.  The patient is on DuoNeb, Norvasc, atenolol,

Cepacol, benzonatate, Pulmicort, cefazolin, Hygroton, Klonopin 0.5 mg twice

a day, Catapres, Lovenox, Fergon, folic acid, Mucinex, Robitussin, Ativan

0.5 mg every 6 hours p.r.n. and the patient last time took it yesterday,

Solu-Medrol, oxycodone, vitamin D and Ambien 10 mg at the nighttime as

needed for insomnia.



LABORATORY DATA:  WBC cells 16.7, hemoglobin 10.1, hematocrit 31.8. 

Chemistry reviewed.  Sodium 143, potassium 4.2, ALT is 73.  Toxicology

positive for benzodiazepines and opioids, but the patient was prescribed

those medications.  Immunology reviewed.  Serology reviewed.



MENTAL STATUS EXAMINATION:  The patient presented to be alert and oriented,

pleasant, cooperative, socially appropriate.  The patient had good eye

contact.  Mood described, "I am in peace right now".  Affect was reactive,

mood congruent.  Thought process was coherent and goal directed.  Thought

content, the patient denied visual, auditory, or tactile hallucinations. 

Denied paranoid ideation.  The patient denied thoughts of harming herself

or others.  Denied intent or plan.  As per nursing staff, the patient does

not exhibit any agitated or aggressive behavior.  The patient is pleasant,

cooperative, socially appropriate.



IMPRESSION:  As per history, the patient just lost her  less than a

month ago but adjusting well, rule out adjustment disorder with depressed

and anxious mood.



PLAN:  Continue current management.  Continue current medication.  The

patient has followup appointment with her primary care physician.  The

patient does not exhibit any aggressive or agitated behavior, pleasant,

cooperative.  The patient was advised to take her medication as prescribed.

The patient reported that she was cleaning her bathroom for mold and she

put some bleach and that is why she had difficulty to breathe.  Patient was

advised to take it easy and follow up with outpatient provider.  The

patient is not in any imminent danger to self or others.  This writer will

sign off.  Should you have any questions, give me a call back.







__________________________________________

Jacklyn Higuera MD



DD:  08/01/2018 14:57:31

DT:  08/01/2018 15:00:35

Job # [id number]

## 2018-08-01 NOTE — CP.PCM.PN
Subjective





- Date & Time of Evaluation


Date of Evaluation: 08/01/18


Time of Evaluation: 14:58





- Subjective


Subjective: 





Nephrology Consultation Note:





Assessment: Stable


uncontrolled severe HTN 


BENI resolved


asthma exacerbation with acute respi failure and HCAP, sepsis


mild rhabomyolysis


hypertension (years), chronic back pain, asthma and morbid obesity


adjustment disorder


iron def anemia, vit D def





Plan


Hypertension control with meds as ordered. Patient on multiple BP meds, agree 

with thiazide, losartan and norvasc. also on clonidine and atenolol. increased 

hydralazine


secondary HTN work up negative


abnormal LFT and mild elevated CPK: work up in progress


started weekly Vit D, iron and MVI supplements


GI, ID and pulmonary following





Dose meds/antibiotics for GFR >60. 


Glycemic control


Recommend weight loss, diet, exercise and lifestyle modifications


Further work up/management as per primary team





Thanks for allowing me to participate in care of your patient. Will follow 

patient with you. Please call if any Qs. had d/w team


Dr Didier Villalobos


Office: [phone] Cell: [phone] Fax: [phone]





Chief Complaint; shortness of breath and high BP


reason for consult: HTN and BENI management


HPI: Pt is a 47 F with hx of hypertension (years), chronic back pain, asthma 

and morbid obesity presented with complaints of SOB and is being managed for 

asthma exacerbation, Acute respiratory failure and pneumonia.pt feels better at 

this time. SOB is much better. has cough which is improved. also had abnormal 

LFT and mildly elevated CPK


Denies OTC/herbal meds or NSAIDs


No recent iodinated contrast exposure. 


denies tobacco/smoking/etoh/drugs. 





ROS: 


Cardiovascular: No chest pain. 


Pulmonary: improved shortness of breath now


Gastrointestinal: denies abdominal pain No nausea. No vomiting. 


Genitourinary: No pain while urinating. Denies blood in urine. 


All other negative except as mentioned in HPI





Physical Examination:      


General Appearance: Comfortable, in no acute respiratory distress, co-operative 

. obese


Vitals reviewed and noted as below


Head; Atraumatic, normocephalic


ENT: no ulcers no thrush. Tongue is midline. Oropharynx: no rash or ulcers.


EYES: Pupils are equal, round and reactive to light accommodation. Eye muscles 

and extraocular movement intact. Sclera is anicteric.


Neck; supple no lymphadenopathy, no thyromegaly or bruit


Lungs: Normal respiratory rate/effort. Breath sounds bilateral equal and Has 

right basilar crackles


Heart: Normal rate. s1s2 normal. No rub or gallop. 


Extremities: no edema. No varicose veins


Neurological: Patient is alert, awake and oriented to person, place and time. 

No focal deficit. Strength bilateral appropriate and equal


Skin: Warm and dry. Normal turgor. No rash. Palpitation: Normal elasticity for 

age


Abdomen: Abdomen is soft. Bowel sounds +. There is no abdominal tenderness, no 

guarding/rigidity no organomegaly


Psych: normal insight and normal affect/mood


MSK: no joint tenderness or swelling. Digits and nails normal, no deformity


: kidney or bladder not palpable





Labs/imaging reviewed.


Past medical history, past surgical history, family history, social history, 

allergy reviewed and noted as below


Family hx: no hx of CKD. Rest non-contributory 





work up: UA no protein now


Aldosterone 12 renin 0.1 metanephrines WNL renal artery Doppler no evidence for 

KAYE


Vitamin D less than 12.8


Iron saturation 14% ferritin 35





Objective





- Vital Signs/Intake and Output


Vital Signs (last 24 hours): 


 











Temp Pulse Resp BP Pulse Ox


 


 98.1 F   82   70 H  167/85 H  97 


 


 08/01/18 06:00  08/01/18 14:33  08/01/18 06:00  08/01/18 14:33  08/01/18 06:00








Intake and Output: 


 











 08/01/18 08/01/18





 06:59 18:59


 


Intake Total 620 


 


Balance 620 














- Medications


Medications: 


 Current Medications





Albuterol/Ipratropium (Duoneb 3 Mg/0.5 Mg (3 Ml) Ud)  3 ml IH Q2H PRN


   PRN Reason: Shortness of Breath


   Last Admin: 07/28/18 07:54 Dose:  3 ml


Albuterol/Ipratropium (Duoneb 3 Mg/0.5 Mg (3 Ml) Ud)  3 ml IH [id number] [facility]


   Last Admin: 08/01/18 11:04 Dose:  3 ml


Amlodipine Besylate (Norvasc)  10 mg PO DAILY [facility]


   Last Admin: 08/01/18 09:34 Dose:  10 mg


Atenolol (Tenormin)  25 mg PO BID [facility]


   Last Admin: 08/01/18 09:36 Dose:  25 mg


Benzocaine/Menthol (Cepacol Sore Throat)  1 olivia MT Q2H PRN


   PRN Reason: Sore Throat


   Last Admin: 07/31/18 22:14 Dose:  1 olivia


Benzonatate (Tessalon Perles)  100 mg PO TID [facility]


   Last Admin: 08/01/18 14:35 Dose:  100 mg


Budesonide (Pulmicort Respules)  0.25 mg IH [id number] [facility]


   Last Admin: 08/01/18 07:42 Dose:  0.25 mg


Chlorthalidone (Hygroton)  25 mg PO DAILY [facility]


   Last Admin: 08/01/18 09:32 Dose:  25 mg


Clonazepam (Klonopin)  0.5 mg PO BID [facility]


   PRN Reason: Protocol


   Last Admin: 08/01/18 09:32 Dose:  0.5 mg


Clonidine HCl (Catapres)  0.1 mg PO TID [facility]


   Last Admin: 08/01/18 14:33 Dose:  0.1 mg


Clonidine HCl (Catapres)  0.1 mg PO Q4H PRN


   PRN Reason: Systolic Blood Pressure


Enoxaparin Sodium (Lovenox)  40 mg SC DAILY [facility]


   PRN Reason: Protocol


   Last Admin: 08/01/18 09:33 Dose:  40 mg


Ergocalciferol (Drisdol 50,000 Intl Units Cap)  1 cap PO Q7D [facility]


   Last Admin: 07/31/18 13:11 Dose:  1 cap


Ferrous Gluconate (Fergon)  324 mg PO TID [facility]


   Last Admin: 08/01/18 14:34 Dose:  324 mg


Folic Acid (Folic Acid)  1 mg PO DAILY [facility]


   Last Admin: 08/01/18 09:32 Dose:  1 mg


Guaifenesin (Mucinex La)  600 mg PO BID [facility]


   Last Admin: 08/01/18 09:34 Dose:  600 mg


Guaifenesin/Codeine Phosphate (Robitussin W/Codeine)  5 ml PO Q6H PRN


   PRN Reason: Cough and congestion


   Last Admin: 07/31/18 22:04 Dose:  5 ml


Hydralazine HCl (Apresoline)  10 mg IVP Q6 PRN


   PRN Reason: Systolic Blood Pressure


   Last Admin: 07/30/18 17:23 Dose:  10 mg


Hydralazine HCl (Apresoline)  100 mg PO Q12 [facility]


   Last Admin: 08/01/18 09:30 Dose:  100 mg


Cefazolin Sodium (Ancef 1gm In Ns)  1 gm in 100 mls @ 100 mls/hr IVPB Q8 [facility]


   PRN Reason: Protocol


   Stop: 08/09/18 14:05


   Last Admin: 08/01/18 14:33 Dose:  100 mls/hr


Lorazepam (Ativan)  0.5 mg IVP Q6H PRN; Protocol


   PRN Reason: Anxiety


   Last Admin: 07/30/18 17:13 Dose:  0.5 mg


Losartan Potassium (Cozaar)  100 mg PO DAILY [facility]


   Last Admin: 08/01/18 09:31 Dose:  100 mg


Methylprednisolone (Solu-Medrol)  20 mg IVP Q12 [facility]


   Last Admin: 08/01/18 09:35 Dose:  20 mg


Nystatin (Nystatin Oral Susp)  5 ml PO QID [facility]


   Last Admin: 08/01/18 14:35 Dose:  5 ml


Oxycodone HCl (Oxycodone Immediate Release Tab)  10 mg PO BID PRN


   PRN Reason: Pain, severe (8-10)


   Last Admin: 08/01/18 14:35 Dose:  10 mg


Vitamin B Complex/Vit C/Folic Acid (Nephro-Sydni)  1 tab PO 0800 [facility]


   Last Admin: 08/01/18 09:34 Dose:  1 tab


Zolpidem Tartrate (Ambien)  10 mg PO HS PRN; Protocol


   PRN Reason: Insomnia


   Last Admin: 08/01/18 01:51 Dose:  10 mg











- Labs


Labs: 


 





 07/30/18 06:30 





 07/31/18 07:00 





 











PT  12.2 SECONDS (9.4-12.5)   07/27/18  12:15    


 


INR  1.06  (0.93-1.08)   07/27/18  12:15    


 


APTT  26.4 Seconds (25.1-36.5)   07/27/18  12:15

## 2018-08-01 NOTE — PN
Copied To:  Zeeshan Mcleod MD

Attending MD:  Zeeshan Mcleod MD



DATE:  08/01/2018



SUBJECTIVE:  The patient is in bed, in no acute distress, nontoxic.



PHYSICAL EXAMINATION:

VITAL SIGNS:  Temperature is 98, blood pressure is 168/90, respiratory rate

of 20, heart rate of 74.

HEENT:  Unremarkable.

NECK:  Supple.

LUNGS:  Have decreased breath sounds.

HEART:  Normal S1, S2.

ABDOMEN:  Soft, nontender.



DATA:  Laboratory examination reveals a white count of 16,700, hemoglobin

of 10, platelets of 380.  BUN of 20, creatinine of 0.7.  Urine for

Legionella antigen is negative.  The staph aureus in the blood is noted and

repeat blood cultures are negative.  It is pansensitive for Staph aureus. 

Review of orders reveals the patient to be on Ancef.  Sedimentation rate of

107 is concerning as is C-reactive protein of 194.



ASSESSMENT AND PLAN:  This is a 47-year-old female admitted with severe

sepsis with healthcare-associated pneumonia, sensitive Staph aureus

bacteremia, obesity, body mass index of 38, anxiety, bipolar and today is

day #4 of at least 14 to 21 days of therapy, day #4 of 14 to 21 days of

Ancef with an extremely high sedimentation rate and C-reactive protein.  We

will continue the Ancef day #4 of at least 14 to 21 days with her CBC,

SMA-18, sedimentation rate, C-reactive protein _____ inflammatory markers. 

May need longer therapy as the inflammatory markers are persistently high

and we will follow with you.







__________________________________________

Zeeshan Mcleod MD





DD:  08/01/2018 7:59:04

DT:  08/01/2018 8:01:02

Job # [id number]

## 2018-08-01 NOTE — PN
07/12/2018  Favian Gomez is a 2 y.o., male.  Patient Active Problem List   Diagnosis    Speech delay     Pre-operative evaluation for Procedure(s) (LRB):  RESPONSE-AUDITORY BRAIN STEM (ABR) ** EMISSIONS-OTOACOUSTIC (OAE) (Bilateral)    Favian Gomez is a 2 y.o. male with possible austism spectrum who is undergoing above due to poss hearing blood loss.     LDA:     Prev airway:     Drips:     Patient Active Problem List   Diagnosis    Speech delay       Review of patient's allergies indicates:  No Known Allergies     No current facility-administered medications on file prior to encounter.      No current outpatient prescriptions on file prior to encounter.       Past Surgical History:   Procedure Laterality Date    CIRCUMCISION         Social History     Social History    Marital status: Single     Spouse name: N/A    Number of children: N/A    Years of education: N/A     Occupational History    Not on file.     Social History Main Topics    Smoking status: Never Smoker    Smokeless tobacco: Never Used    Alcohol use Not on file    Drug use: Unknown    Sexual activity: Not on file     Other Topics Concern    Not on file     Social History Narrative    Lives at home with both parents and siblings        No smokers        Outside pets     Stays with maternal grandmother        Hep b given at birth         Vital Signs Range (Last 24H):  Resp:  [21]                 Anesthesia Evaluation    I have reviewed the Patient Summary Reports.    I have reviewed the Nursing Notes.   I have reviewed the Medications.     Review of Systems  Anesthesia Hx:  No problems with previous Anesthesia  Denies Family Hx of Anesthesia complications.   Denies Personal Hx of Anesthesia complications.   Social:  Non-Smoker    Hematology/Oncology:  Hematology Normal   Oncology Normal     Cardiovascular:  Cardiovascular Normal    Copied To:  Yvette Pena MD

Attending MD:  Yvette Pena MD



DATE:  07/31/2018



PULMONARY PROGRESS NOTE



REFERRING PHYSICIAN:  Aries Van MD



SUBJECTIVE:  Sitting in the side of the bed.  Overall feels better.  Still

has some cough.  No nausea.  No vomiting.  No diarrhea.  No leg pain or leg

swelling.



OBJECTIVE:

GENERAL:  In no acute distress.

VITAL SIGNS:  Temp is 98, heart rate is 74, respiratory rate is 18, blood

pressure 165/85, pulse ox 98% on room air.

HEENT:  Moist mucous membrane.  Crowded airway.

NECK:  Supple.  No JVD.

LUNGS:  Prolonged expiratory phase.

HEART:  S1 and S2.

ABDOMEN:  Soft and nontender.  No organomegaly.

EXTREMITIES:  No edema.

NEUROLOGIC:  Awake, alert.  Follows simple command.



MEDICATIONS:  She is on Ambien 10 mg at bedtime p.r.n., Ancef 1 g IV every

8 hours, hydralazine 100 mg twice a day, lorazepam 0.5 mg every 6 hours,

clonidine 0.1 mg every 4 hours, Cepacol lozenges every 12 hours p.r.n.,

Cozaar 100 mg daily, vitamin D 50,000 units every 7 days, DuoNeb every 4

hours round the clock, ferrous sulfate 324 mg three times a day, folic acid

1 mg daily, chlorthalidone 25 mg daily, Klonopin 0.5 mg twice a day,

Lovenox 40 mg daily, morphine 4 mg every 4 hours p.r.n., Mucinex  mg

twice a day,  Nephro vitamins daily, Norvasc 10 mg daily, oxycodone

immediate release 10 mg every 12 hours p.r.n., Pulmicort inhaled twice a

day, Robitussin with Codeine every 6 hours p.r.n., Solu-Medrol 40 mg every

12 hours, Tenormin 25 mg daily, Tessalon Perles 100 mg three times a day.



LABORATORY DATA:  Shows hemoglobin 10.1, hematocrit 31.8.  Sodium 143,

potassium 4.2, chloride 104, bicarbonate 30, BUN 20, creatinine 0.7,

glucose is 106, calcium 9.1, AST 32, ALT 73, alk phos is 108, albumin is

3.5.  Had echocardiogram done, which shows right ventricular systolic

pressure is 22, _____ is normal size, mild concentric left ventricular

hypertrophy, LV ejection fraction is 65.



IMPRESSION AND PLAN:  Exacerbation of chronic obstructive lung disease,

bipolar disorder, hypertension, renal insufficiency, may have oropharyngeal

candidiasis.  We will decrease Solu-Medrol.  Add nystatin swish and

swallow.  Gastric prophylaxis.  Deep vein thrombosis prophylaxis.  Pain

management as per Dr. Van.



Thank you and we will follow with you.





__________________________________________

Yvette Pena MD

DD:  07/31/2018 21:59:33

DT:  07/31/2018 22:58:41

Job # [id number]   Pulmonary:  Pulmonary Normal    Renal/:  Renal/ Normal     Hepatic/GI:  Hepatic/GI Normal    Musculoskeletal:  Musculoskeletal Normal    OB/GYN/PEDS:  Legal Guardian is Mother , birth was Full Term Denies Developmental Delay Denies Anomilies    Neurological:  Neurology Normal    Endocrine:  Endocrine Normal    Dermatological:  Skin Normal    Psych:  Psychiatric Normal           Physical Exam  General:  Well nourished    Airway/Jaw/Neck:  Airway Findings: Mouth Opening: Normal Tongue: Normal  General Airway Assessment: Pediatric      Dental:  Dental Findings: In tact   Chest/Lungs:  Chest/Lungs Findings: Clear to auscultation     Heart/Vascular:  Heart Findings: Rate: Normal  Rhythm: Regular Rhythm  Sounds: Normal        Mental Status:  Mental Status Findings:  Cooperative, Normally Active child         Anesthesia Plan  Type of Anesthesia, risks & benefits discussed:  Anesthesia Type:  general  Patient's Preference:   Intra-op Monitoring Plan:   Intra-op Monitoring Plan Comments:   Post Op Pain Control Plan:   Post Op Pain Control Plan Comments:   Induction:   Inhalation  Beta Blocker:  Patient is not currently on a Beta-Blocker (No further documentation required).       Informed Consent: Patient representative understands risks and agrees with Anesthesia plan.  Questions answered. Anesthesia consent signed with patient representative.  ASA Score: 2     Day of Surgery Review of History & Physical:            Ready For Surgery From Anesthesia Perspective.

## 2018-08-01 NOTE — PN
Copied To:  Aries Van MD

Attending MD:  Aries Van MD



DATE:  07/31/2018



SUBJECTIVE:  Patient is sitting on the chair, comfortable.  No distress. 

Blood pressure is still running high, 200/100 this morning.  Patient still

on IV antibiotics, IV steroids and still needs further management.  She

does have positive blood cultures. She wants to go home; I explained her to

need to stay for continuation of therapy and risk of leaving early. 

Patient has no chest pain, no short of breath.  She feels better.



PHYSICAL EXAMINATION:

VITAL SIGNS:  As I mentioned, temperature 98, blood pressure 200/100,

respirations 20, saturation 96% on room air.

HEAD AND NECK:  Normal.  No JVD.  No thyromegaly.

CHEST:  Clear bilateral.

CARDIAC:  First sounds and second sounds normal.

ABDOMEN:  Soft, obese, nontender.

EXTREMITIES:  No edema.



LABORATORY DATA:  White count 16.7, hemoglobin 10.1, hematocrit 31.8,

platelet 380.  Chemistry showed the following, sodium 143, potassium 4.2,

chloride 104, bicarb 30, BUN 20, creatinine 0.7.  Liver function test is

within normal range now except an ALT of 73.  Her iron saturation is low,

14; and iron and total iron binding capacity are normal; ferritin 35.7. 

Patient also had high procalcitonin.



IMPRESSION AND PLAN:

1.  Acute respiratory failure, acute asthma exacerbation, possible

pneumonia.  Continue current therapy.

2.  Positive blood cultures, Gram-positive Staphylococcus aureus, x1.  She

was on vancomycin, switch now to Ancef.  Patient was getting Merrem for

underlying pneumonia and she seems improving.  We will follow up with

Infectious Disease consult and follow up his recommendations.  Continue

inhaled and IV bronchodilators.

3.  Anemia.  Patient was advised to get colonoscopy, explained to her that

she _____ .  She may benefit from colonoscopy and follow up as outpatient.

4.  Abnormal liver function tests, etiology unclear, serology sent by GI

fellow, and ultrasound of the liver shows fatty liver.

5.  Hypertension, poorly controlled and difficult to control.  Continue

hydralazine 50 b.i.d., 10 mg IV every 6 hours _____ given because of lack

of telemetry on that particular patient.  We will continue clonidine 0.1 mg

increased to three times a day and 0.1 every four hours p.r.n.  Continue

Norvasc and atenolol 25 mg b.i.d.  Amlodipine, we increased it to 10 mg

once a day.  I will follow up with Nephrology consult and follow up his

recommendation.

6.  Chronic anxiety, hip arthritis.  Continue Ambien, continue Percocet,

and will follow up clinically.  Patient otherwise seems doing better and

follow up with Psychiatry Dr. Villasenor and other consultants.





__________________________________________

Aries Van MD



DD:  08/01/2018 15:20:56

DT:  08/01/2018 19:34:04

Job # [id number]

## 2018-08-01 NOTE — CP.PCM.PN
Subjective





- Date & Time of Evaluation


Date of Evaluation: 08/01/18


Time of Evaluation: 06:20





- Subjective


Subjective: 





sitting side of bed, awake, feels better, no distress, denies shortness of 

breath





Reason for consultation and follow up: Cardiac evaluation for shortness of 

breath, history of hypertension, asthma





Seen and examined by me and Dr. Grover





Objective





- Vital Signs/Intake and Output


Vital Signs (last 24 hours): 


 











Temp Pulse Resp BP Pulse Ox


 


 98.9 F   74   20   168/95 H  96 


 


 08/01/18 00:00  08/01/18 00:00  08/01/18 00:00  08/01/18 00:00  08/01/18 00:00








Intake and Output: 


 











 08/01/18 08/01/18





 06:59 18:59


 


Intake Total 620 


 


Balance 620 














- Medications


Medications: 


 Current Medications





Albuterol/Ipratropium (Duoneb 3 Mg/0.5 Mg (3 Ml) Ud)  3 ml IH Q2H PRN


   PRN Reason: Shortness of Breath


   Last Admin: 07/28/18 07:54 Dose:  3 ml


Albuterol/Ipratropium (Duoneb 3 Mg/0.5 Mg (3 Ml) Ud)  3 ml IH [id number] [facility]


   Last Admin: 08/01/18 07:42 Dose:  3 ml


Amlodipine Besylate (Norvasc)  10 mg PO DAILY [facility]


   Last Admin: 07/31/18 09:32 Dose:  10 mg


Atenolol (Tenormin)  25 mg PO BID [facility]


   Last Admin: 07/31/18 17:09 Dose:  25 mg


Benzocaine/Menthol (Cepacol Sore Throat)  1 olivia MT Q2H PRN


   PRN Reason: Sore Throat


   Last Admin: 07/31/18 22:14 Dose:  1 olivia


Benzonatate (Tessalon Perles)  100 mg PO TID [facility]


   Last Admin: 07/31/18 17:09 Dose:  100 mg


Budesonide (Pulmicort Respules)  0.25 mg IH [id number] [facility]


   Last Admin: 08/01/18 07:42 Dose:  0.25 mg


Chlorthalidone (Hygroton)  25 mg PO DAILY [facility]


   Last Admin: 07/31/18 09:31 Dose:  25 mg


Clonazepam (Klonopin)  0.5 mg PO BID [facility]


   PRN Reason: Protocol


   Last Admin: 07/31/18 17:09 Dose:  0.5 mg


Clonidine HCl (Catapres)  0.1 mg PO TID [facility]


   Last Admin: 07/31/18 17:09 Dose:  0.1 mg


Clonidine HCl (Catapres)  0.1 mg PO Q4H PRN


   PRN Reason: Systolic Blood Pressure


Enoxaparin Sodium (Lovenox)  40 mg SC DAILY [facility]


   PRN Reason: Protocol


   Last Admin: 07/31/18 09:31 Dose:  40 mg


Ergocalciferol (Drisdol 50,000 Intl Units Cap)  1 cap PO Q7D [facility]


   Last Admin: 07/31/18 13:11 Dose:  1 cap


Ferrous Gluconate (Fergon)  324 mg PO TID [facility]


   Last Admin: 07/31/18 17:09 Dose:  324 mg


Folic Acid (Folic Acid)  1 mg PO DAILY [facility]


   Last Admin: 07/31/18 09:31 Dose:  1 mg


Guaifenesin (Mucinex La)  600 mg PO BID [facility]


   Last Admin: 07/31/18 17:09 Dose:  600 mg


Guaifenesin/Codeine Phosphate (Robitussin W/Codeine)  5 ml PO Q6H PRN


   PRN Reason: Cough and congestion


   Last Admin: 07/31/18 22:04 Dose:  5 ml


Hydralazine HCl (Apresoline)  10 mg IVP Q6 PRN


   PRN Reason: Systolic Blood Pressure


   Last Admin: 07/30/18 17:23 Dose:  10 mg


Hydralazine HCl (Apresoline)  100 mg PO Q12 [facility]


   Last Admin: 07/31/18 22:05 Dose:  100 mg


Cefazolin Sodium (Ancef 1gm In Ns)  1 gm in 100 mls @ 100 mls/hr IVPB Q8 [facility]


   PRN Reason: Protocol


   Stop: 08/09/18 14:05


   Last Admin: 08/01/18 05:44 Dose:  100 mls/hr


Lorazepam (Ativan)  0.5 mg IVP Q6H PRN; Protocol


   PRN Reason: Anxiety


   Last Admin: 07/30/18 17:13 Dose:  0.5 mg


Losartan Potassium (Cozaar)  100 mg PO DAILY [facility]


   Last Admin: 07/31/18 09:32 Dose:  100 mg


Methylprednisolone (Solu-Medrol)  20 mg IVP Q12 [facility]


   Last Admin: 07/31/18 22:12 Dose:  20 mg


Morphine Sulfate (Morphine)  4 mg IVP Q4H PRN


   PRN Reason: Pain, moderate (4-7)


   Last Admin: 08/01/18 05:49 Dose:  4 mg


Nystatin (Nystatin Oral Susp)  5 ml PO QID [facility]


   Last Admin: 07/31/18 22:10 Dose:  5 ml


Oxycodone HCl (Oxycodone Immediate Release Tab)  10 mg PO BID PRN


   PRN Reason: Pain, severe (8-10)


   Last Admin: 07/30/18 17:17 Dose:  10 mg


Vitamin B Complex/Vit C/Folic Acid (Nephro-Sydni)  1 tab PO 0800 [facility]


Zolpidem Tartrate (Ambien)  10 mg PO HS PRN; Protocol


   PRN Reason: Insomnia


   Last Admin: 08/01/18 01:51 Dose:  10 mg











- Labs


Labs: 


 





 07/30/18 06:30 





 07/31/18 07:00 





 











PT  12.2 SECONDS (9.4-12.5)   07/27/18  12:15    


 


INR  1.06  (0.93-1.08)   07/27/18  12:15    


 


APTT  26.4 Seconds (25.1-36.5)   07/27/18  12:15    














- Constitutional


Appears: No Acute Distress





- Eye Exam


Eye Exam: Normal appearance





- ENT Exam


ENT Exam: Mucous Membranes Moist





- Respiratory Exam


Respiratory Exam: Decreased Breath Sounds, NORMAL BREATHING PATTERN





- Cardiovascular Exam


Cardiovascular Exam: +S1, +S2





- GI/Abdominal Exam


GI & Abdominal Exam: Soft, Normal Bowel Sounds





- Extremities Exam


Additional comments: 





1+edema





- Neurological Exam


Neurological Exam: Alert, Awake, Oriented x3





- Psychiatric Exam


Psychiatric exam: Normal Affect





- Skin


Skin Exam: Intact, Warm





Assessment and Plan





- Assessment and Plan (Free Text)


Assessment: 








A 47 year old female obese,who came in to the ER due to shortness of breath. 

History of hypertension and asthma. Non compliant with medications, Seen by 

Psychiatry for anxiety and depression. ID on consult. D-dimer elevated, VQ scan 

done low probability of pulmonary embolism.Renal ultrasound done-unremarkable 

findings.  Exacerbation of asthma. urine positive for opiates and 

benzodiapines.uncontrolled hypertension.Blood culture positive for Staphyloccus 

aureaus, ID on consult, on IV antibiotics.











Plan: 





ECHO no evidence of vegetations.normal, LVEF-65%


Feels good


Blood pressure improved from SBP of 190-200's to 160's


Improved after adjusting antihypertensive medications


Continue current medications


On Tenormin 25 mg daily,Catapres 0.1 mg TID ,Hydralazine 50 very 12 hours.


 Lovenox 40 mg daily,Apresoline 100 mg q 12 hours,, Microzide  12.5 mg daily


 Cozaar  100 mg daily, 


Blood culture (one set) Staphyloccus aureaus


Continue antibiotics per ID


Pulmonary  on consult


GI on consult for elevated LFT's


Lifestyle modification


Weight reduction


Continue current treatment


Continue current medications


Instructed to be compliant with medications 


  and follow up with her  physicians.





Will follow up





Plan and treatment discussed with Dr. Grover

## 2018-08-01 NOTE — PN
Copied To:  Yvette Pena MD

Attending MD:  Yvette Pena MD



DATE:  08/01/2018



PULMONARY PROGRESS NOTE



REFERRING PHYSICIAN:  Aries Van MD



SUBJECTIVE:  She is out of bed to chair, feels much better.  Night was

unremarkable.  Sore throat is better.  No nausea.  No vomiting.  No

diarrhea.  No leg pain or leg swelling.



OBJECTIVE:

GENERAL:  In no acute distress.

VITAL SIGNS:  Temp is 98, heart rate is 82, respiratory rate is 20, blood

pressure 167/85, pulse ox 96% on room air.

HEENT:  Moist mucous membrane.  No ulcer or thrush noted.

NECK:  Supple.  No JVD.

LUNGS:  Has a fair airflow.

HEART:  S1 and S2.

ABDOMEN:  Soft and nontender.  No organomegaly.

EXTREMITIES:  No edema.

NEUROLOGIC:  Awake, alert.  Follows simple command.



MEDICATIONS:  She is on Ambien 10 mg at bedtime p.r.n., Ancef 1 g IV every

8 hours, hydralazine 10 mg every 6 hours p.r.n., Ativan 0.5 mg every 6

hours p.r.n., clonidine 0.1 mg every 4 hours p.r.n., Cepacol lozenges every

2 hours p.r.n., Cozaar 100 mg daily, vitamin D 50,000 units every 7 days,

DuoNeb every 2 hours p.r.n., Fergon 324 mg three times a day, folic acid 1

mg daily, chlorthalidone 25 mg daily, Klonopin 0.5 mg twice a day, Lovenox

40 mg daily, Mucinex  mg twice a day, Nephro-Sydni daily, Norvasc 10

mg daily, nystatin oral suspension 5 mL q.i.d., oxycodone immediate release

10 mg twice a day p.r.n., budesonide inhaled twice a day, Robitussin with

Codeine every 6 hours p.r.n., Solu-Medrol 20 mg every 12 hours, Tenormin 25

mg twice a day, Tessalon Perles 100 mg three times a day.



LABORATORY DATA:  Reviewed and noted.  Total creatine kinase today is 63.



IMPRESSION AND PLAN:  Exacerbation of chronic obstructive lung disease,

bipolar disorder, hypertension, renal insufficiency.  From a Pulmonary

point of view, doing well.  Steroids being tapered.  Continue inhaled

bronchodilators.  I spoke to the patient about reactive airway/asthma and

avoid environmental toxins.  She expressed understanding, could be

discharged with tapered dose of steroids and inhaled bronchodilators.



Thank you and we will follow with you.

__________________________________________

Yvette Pena MD



DD:  08/01/2018 15:07:38

DT:  08/01/2018 17:46:39

Job # [id number]

## 2018-08-02 RX ADMIN — IPRATROPIUM BROMIDE AND ALBUTEROL SULFATE SCH: .5; 3 SOLUTION RESPIRATORY (INHALATION) at 23:46

## 2018-08-02 RX ADMIN — NYSTATIN SCH ML: 100000 SUSPENSION ORAL at 09:19

## 2018-08-02 RX ADMIN — CEFAZOLIN SCH MLS/HR: 330 INJECTION, POWDER, FOR SOLUTION INTRAMUSCULAR; INTRAVENOUS at 21:46

## 2018-08-02 RX ADMIN — IPRATROPIUM BROMIDE AND ALBUTEROL SULFATE SCH ML: .5; 3 SOLUTION RESPIRATORY (INHALATION) at 09:03

## 2018-08-02 RX ADMIN — GUAIFENESIN SCH MG: 600 TABLET, EXTENDED RELEASE ORAL at 09:18

## 2018-08-02 RX ADMIN — IPRATROPIUM BROMIDE AND ALBUTEROL SULFATE SCH: .5; 3 SOLUTION RESPIRATORY (INHALATION) at 00:15

## 2018-08-02 RX ADMIN — ENOXAPARIN SODIUM SCH: 40 INJECTION SUBCUTANEOUS at 09:18

## 2018-08-02 RX ADMIN — GUAIFENESIN SCH MG: 600 TABLET, EXTENDED RELEASE ORAL at 17:11

## 2018-08-02 RX ADMIN — IPRATROPIUM BROMIDE AND ALBUTEROL SULFATE SCH ML: .5; 3 SOLUTION RESPIRATORY (INHALATION) at 17:49

## 2018-08-02 RX ADMIN — PURIFIED WATER PRN LOZ: 99.05 LIQUID OPHTHALMIC at 21:48

## 2018-08-02 RX ADMIN — GUAIFENESIN AND CODEINE PHOSPHATE PRN ML: 100; 10 SOLUTION ORAL at 21:51

## 2018-08-02 RX ADMIN — BUDESONIDE SCH MG: 0.25 SUSPENSION RESPIRATORY (INHALATION) at 19:17

## 2018-08-02 RX ADMIN — NYSTATIN SCH ML: 100000 SUSPENSION ORAL at 17:12

## 2018-08-02 RX ADMIN — IPRATROPIUM BROMIDE AND ALBUTEROL SULFATE SCH ML: .5; 3 SOLUTION RESPIRATORY (INHALATION) at 19:17

## 2018-08-02 RX ADMIN — OXYCODONE HYDROCHLORIDE PRN MG: 10 TABLET ORAL at 11:59

## 2018-08-02 RX ADMIN — CEFAZOLIN SCH MLS/HR: 330 INJECTION, POWDER, FOR SOLUTION INTRAMUSCULAR; INTRAVENOUS at 06:33

## 2018-08-02 RX ADMIN — PURIFIED WATER PRN LOZ: 99.05 LIQUID OPHTHALMIC at 06:38

## 2018-08-02 RX ADMIN — Medication SCH TAB: at 09:18

## 2018-08-02 RX ADMIN — IPRATROPIUM BROMIDE AND ALBUTEROL SULFATE SCH: .5; 3 SOLUTION RESPIRATORY (INHALATION) at 03:35

## 2018-08-02 RX ADMIN — CEFAZOLIN SCH MLS/HR: 330 INJECTION, POWDER, FOR SOLUTION INTRAMUSCULAR; INTRAVENOUS at 14:00

## 2018-08-02 RX ADMIN — IPRATROPIUM BROMIDE AND ALBUTEROL SULFATE SCH: .5; 3 SOLUTION RESPIRATORY (INHALATION) at 17:36

## 2018-08-02 RX ADMIN — OXYCODONE HYDROCHLORIDE PRN MG: 10 TABLET ORAL at 21:47

## 2018-08-02 RX ADMIN — NYSTATIN SCH ML: 100000 SUSPENSION ORAL at 21:47

## 2018-08-02 RX ADMIN — NYSTATIN SCH ML: 100000 SUSPENSION ORAL at 14:00

## 2018-08-02 RX ADMIN — IPRATROPIUM BROMIDE AND ALBUTEROL SULFATE SCH ML: .5; 3 SOLUTION RESPIRATORY (INHALATION) at 14:28

## 2018-08-02 NOTE — CP.PCM.PN
Subjective





- Date & Time of Evaluation


Date of Evaluation: 08/02/18


Time of Evaluation: 13:47





- Subjective


Subjective: 





Nephrology Consultation Note:





Assessment: Stable


uncontrolled severe HTN 


BENI resolved


asthma exacerbation with acute respi failure and HCAP, sepsis


mild rhabomyolysis


hypertension (years), chronic back pain, asthma and morbid obesity


adjustment disorder


iron def anemia, vit D def





Plan


Hypertension control with meds as ordered. Patient on multiple BP meds, agree 

with thiazide, losartan and norvasc. also on clonidine and atenolol. increased 

hydralazine 100 mg tid


secondary HTN work up negative


abnormal LFT and mild elevated CPK: work up in progress


started weekly Vit D, iron and MVI supplements


GI, ID and pulmonary following





Dose meds/antibiotics for GFR >60. 


Glycemic control


Recommend weight loss, diet, exercise and lifestyle modifications


Further work up/management as per primary team





Thanks for allowing me to participate in care of your patient. Will follow 

patient with you. Please call if any Qs. had d/w team


Dr Didier Villalobos


Office: [phone] Cell: [phone] Fax: [phone]





Chief Complaint; shortness of breath and high BP


reason for consult: HTN and BENI management


HPI: Pt is a 47 F with hx of hypertension (years), chronic back pain, asthma 

and morbid obesity presented with complaints of SOB and is being managed for 

asthma exacerbation, Acute respiratory failure and pneumonia.pt feels better at 

this time. SOB is much better. has cough which is improved. also had abnormal 

LFT and mildly elevated CPK


Denies OTC/herbal meds or NSAIDs


No recent iodinated contrast exposure. 


denies tobacco/smoking/etoh/drugs. 





ROS: c/o chronic pain due to injuries in past


Cardiovascular: No chest pain. 


Pulmonary: improved shortness of breath now


Gastrointestinal: denies abdominal pain No nausea. No vomiting. 


Genitourinary: No pain while urinating. Denies blood in urine. 


All other negative except as mentioned in HPI





Physical Examination:      


General Appearance: Comfortable, in no acute respiratory distress, co-operative 

. obese


Vitals reviewed and noted as below


Head; Atraumatic, normocephalic


ENT: no ulcers no thrush. Tongue is midline. Oropharynx: no rash or ulcers.


EYES: Pupils are equal, round and reactive to light accommodation. Eye muscles 

and extraocular movement intact. Sclera is anicteric.


Neck; supple no lymphadenopathy, no thyromegaly or bruit


Lungs: Normal respiratory rate/effort. Breath sounds bilateral equal and clear


Heart: Normal rate. s1s2 normal. No rub or gallop. 


Extremities: no edema. No varicose veins


Neurological: Patient is alert, awake and oriented to person, place and time. 

No focal deficit. Strength bilateral appropriate and equal


Skin: Warm and dry. Normal turgor. No rash. Palpitation: Normal elasticity for 

age


Abdomen: Abdomen is soft. Bowel sounds +. There is no abdominal tenderness, no 

guarding/rigidity no organomegaly


Psych: normal insight and normal affect/mood


MSK: no joint tenderness or swelling. Digits and nails normal, no deformity


: kidney or bladder not palpable





Labs/imaging reviewed.


Past medical history, past surgical history, family history, social history, 

allergy reviewed and noted as below


Family hx: no hx of CKD. Rest non-contributory 





work up: UA no protein now


Aldosterone 12 renin 0.1 metanephrines WNL renal artery Doppler no evidence for 

KAYE


Vitamin D less than 12.8


Iron saturation 14% ferritin 35








Objective





- Vital Signs/Intake and Output


Vital Signs (last 24 hours): 


 











Temp Pulse Resp BP Pulse Ox


 


 97.8 F   82   20   167/90 H  95 


 


 08/02/18 08:39  08/02/18 09:20  08/02/18 08:39  08/02/18 09:20  08/02/18 08:39








Intake and Output: 


 











 08/02/18 08/02/18





 06:59 18:59


 


Intake Total 640 


 


Balance 640 














- Medications


Medications: 


 Current Medications





Albuterol/Ipratropium (Duoneb 3 Mg/0.5 Mg (3 Ml) Ud)  3 ml IH Q2H PRN


   PRN Reason: Shortness of Breath


   Last Admin: 07/28/18 07:54 Dose:  3 ml


Albuterol/Ipratropium (Duoneb 3 Mg/0.5 Mg (3 Ml) Ud)  3 ml IH [id number] [facility]


   Last Admin: 08/02/18 09:03 Dose:  3 ml


Amlodipine Besylate (Norvasc)  10 mg PO DAILY [facility]


   Last Admin: 08/02/18 09:19 Dose:  10 mg


Atenolol (Tenormin)  25 mg PO BID [facility]


   Last Admin: 08/02/18 09:20 Dose:  25 mg


Benzocaine/Menthol (Cepacol Sore Throat)  1 olivia MT Q2H PRN


   PRN Reason: Sore Throat


   Last Admin: 08/02/18 06:38 Dose:  1 olivia


Benzonatate (Tessalon Perles)  100 mg PO TID [facility]


   Last Admin: 08/02/18 09:20 Dose:  100 mg


Budesonide (Pulmicort Respules)  0.25 mg IH [id number] [facility]


   Last Admin: 08/01/18 20:35 Dose:  0.25 mg


Chlorthalidone (Hygroton)  25 mg PO DAILY [facility]


   Last Admin: 08/02/18 09:17 Dose:  25 mg


Clonazepam (Klonopin)  0.5 mg PO BID [facility]


   PRN Reason: Protocol


   Last Admin: 08/02/18 09:17 Dose:  0.5 mg


Clonidine HCl (Catapres)  0.1 mg PO TID [facility]


   Last Admin: 08/02/18 09:15 Dose:  0.1 mg


Clonidine HCl (Catapres)  0.1 mg PO Q4H PRN


   PRN Reason: Systolic Blood Pressure


Enoxaparin Sodium (Lovenox)  40 mg SC DAILY [facility]


   PRN Reason: Protocol


   Last Admin: 08/02/18 09:18 Dose:  Not Given


Ergocalciferol (Drisdol 50,000 Intl Units Cap)  1 cap PO Q7D [facility]


   Last Admin: 07/31/18 13:11 Dose:  1 cap


Ferrous Gluconate (Fergon)  324 mg PO TID [facility]


   Last Admin: 08/02/18 09:16 Dose:  324 mg


Folic Acid (Folic Acid)  1 mg PO DAILY [facility]


   Last Admin: 08/02/18 09:17 Dose:  1 mg


Guaifenesin (Mucinex La)  600 mg PO BID [facility]


   Last Admin: 08/02/18 09:18 Dose:  600 mg


Guaifenesin/Codeine Phosphate (Robitussin W/Codeine)  5 ml PO Q6H PRN


   PRN Reason: Cough and congestion


   Last Admin: 08/01/18 20:17 Dose:  5 ml


Hydralazine HCl (Apresoline)  10 mg IVP Q6 PRN


   PRN Reason: Systolic Blood Pressure


   Last Admin: 08/02/18 06:33 Dose:  10 mg


Hydralazine HCl (Apresoline)  100 mg PO TID [facility]


Cefazolin Sodium (Ancef 1gm In Ns)  1 gm in 100 mls @ 100 mls/hr IVPB Q8 JEF


   PRN Reason: Protocol


   Stop: 08/09/18 14:05


   Last Admin: 08/02/18 06:33 Dose:  100 mls/hr


Lorazepam (Ativan)  0.5 mg IVP Q6H PRN; Protocol


   PRN Reason: Anxiety


   Last Admin: 07/30/18 17:13 Dose:  0.5 mg


Losartan Potassium (Cozaar)  100 mg PO DAILY [facility]


   Last Admin: 08/02/18 09:16 Dose:  100 mg


Nystatin (Nystatin Oral Susp)  5 ml PO QID [facility]


   Last Admin: 08/02/18 09:19 Dose:  5 ml


Oxycodone HCl (Oxycodone Immediate Release Tab)  10 mg PO Q6H PRN


   PRN Reason: Pain, moderate (4-7)


   Last Admin: 08/02/18 11:59 Dose:  10 mg


Prednisone (Prednisone Tab)  20 mg PO DAILY [facility]


   Last Admin: 08/02/18 09:20 Dose:  20 mg


Vitamin B Complex/Vit C/Folic Acid (Nephro-Sydni)  1 tab PO 0800 [facility]


   Last Admin: 08/02/18 09:18 Dose:  1 tab


Zolpidem Tartrate (Ambien)  10 mg PO HS PRN; Protocol


   PRN Reason: Insomnia


   Last Admin: 08/01/18 22:34 Dose:  10 mg











- Labs


Labs: 


 





 07/30/18 06:30 





 07/31/18 07:00 





 











PT  12.2 SECONDS (9.4-12.5)   07/27/18  12:15    


 


INR  1.06  (0.93-1.08)   07/27/18  12:15    


 


APTT  26.4 Seconds (25.1-36.5)   07/27/18  12:15

## 2018-08-02 NOTE — CP.PCM.PN
Subjective





- Date & Time of Evaluation


Date of Evaluation: 08/02/18


Time of Evaluation: 06:30





- Subjective


Subjective: 





Lying in bed, easily awaken , no distress, denies shortness of breath, dry cough





Reason for consultation and follow up: Cardiac evaluation for shortness of 

breath, history of hypertension, asthma





Seen and examined by me and Dr. Grover





Objective





- Vital Signs/Intake and Output


Vital Signs (last 24 hours): 


 











Temp Pulse Resp BP Pulse Ox


 


 97.4 F L  77   18   167/90 H  99 


 


 08/01/18 22:00  08/01/18 22:00  08/01/18 22:00  08/02/18 06:33  08/01/18 22:00








Intake and Output: 


 











 08/02/18 08/02/18





 06:59 18:59


 


Intake Total 640 


 


Balance 640 














- Medications


Medications: 


 Current Medications





Albuterol/Ipratropium (Duoneb 3 Mg/0.5 Mg (3 Ml) Ud)  3 ml IH Q2H PRN


   PRN Reason: Shortness of Breath


   Last Admin: 07/28/18 07:54 Dose:  3 ml


Albuterol/Ipratropium (Duoneb 3 Mg/0.5 Mg (3 Ml) Ud)  3 ml IH [id number] [facility]


   Last Admin: 08/02/18 03:35 Dose:  Not Given


Amlodipine Besylate (Norvasc)  10 mg PO DAILY [facility]


   Last Admin: 08/01/18 09:34 Dose:  10 mg


Atenolol (Tenormin)  25 mg PO BID [facility]


   Last Admin: 08/01/18 17:13 Dose:  25 mg


Benzocaine/Menthol (Cepacol Sore Throat)  1 olivia MT Q2H PRN


   PRN Reason: Sore Throat


   Last Admin: 08/02/18 06:38 Dose:  1 olivia


Benzonatate (Tessalon Perles)  100 mg PO TID [facility]


   Last Admin: 08/01/18 17:13 Dose:  100 mg


Budesonide (Pulmicort Respules)  0.25 mg IH [id number] [facility]


   Last Admin: 08/01/18 20:35 Dose:  0.25 mg


Chlorthalidone (Hygroton)  25 mg PO DAILY [facility]


   Last Admin: 08/01/18 09:32 Dose:  25 mg


Clonazepam (Klonopin)  0.5 mg PO BID [facility]


   PRN Reason: Protocol


   Last Admin: 08/01/18 17:12 Dose:  0.5 mg


Clonidine HCl (Catapres)  0.1 mg PO TID [facility]


   Last Admin: 08/01/18 17:11 Dose:  0.1 mg


Clonidine HCl (Catapres)  0.1 mg PO Q4H PRN


   PRN Reason: Systolic Blood Pressure


Enoxaparin Sodium (Lovenox)  40 mg SC DAILY [facility]


   PRN Reason: Protocol


   Last Admin: 08/01/18 09:33 Dose:  40 mg


Ergocalciferol (Drisdol 50,000 Intl Units Cap)  1 cap PO Q7D [facility]


   Last Admin: 07/31/18 13:11 Dose:  1 cap


Ferrous Gluconate (Fergon)  324 mg PO TID [facility]


   Last Admin: 08/01/18 17:12 Dose:  324 mg


Folic Acid (Folic Acid)  1 mg PO DAILY [facility]


   Last Admin: 08/01/18 09:32 Dose:  1 mg


Guaifenesin (Mucinex La)  600 mg PO BID [facility]


   Last Admin: 08/01/18 17:12 Dose:  600 mg


Guaifenesin/Codeine Phosphate (Robitussin W/Codeine)  5 ml PO Q6H PRN


   PRN Reason: Cough and congestion


   Last Admin: 08/01/18 20:17 Dose:  5 ml


Hydralazine HCl (Apresoline)  10 mg IVP Q6 PRN


   PRN Reason: Systolic Blood Pressure


   Last Admin: 08/02/18 06:33 Dose:  10 mg


Hydralazine HCl (Apresoline)  100 mg PO Q12 [facility]


   Last Admin: 08/01/18 22:33 Dose:  100 mg


Cefazolin Sodium (Ancef 1gm In Ns)  1 gm in 100 mls @ 100 mls/hr IVPB Q8 J[facility]


   PRN Reason: Protocol


   Stop: 08/09/18 14:05


   Last Admin: 08/02/18 06:33 Dose:  100 mls/hr


Lorazepam (Ativan)  0.5 mg IVP Q6H PRN; Protocol


   PRN Reason: Anxiety


   Last Admin: 07/30/18 17:13 Dose:  0.5 mg


Losartan Potassium (Cozaar)  100 mg PO DAILY [facility]


   Last Admin: 08/01/18 09:31 Dose:  100 mg


Methylprednisolone (Solu-Medrol)  20 mg IVP Q12 [facility]


   Last Admin: 08/01/18 22:29 Dose:  20 mg


Nystatin (Nystatin Oral Susp)  5 ml PO QID [facility]


   Last Admin: 08/01/18 22:33 Dose:  5 ml


Vitamin B Complex/Vit C/Folic Acid (Nephro-Sydni)  1 tab PO 0800 [facility]


   Last Admin: 08/01/18 09:34 Dose:  1 tab


Zolpidem Tartrate (Ambien)  10 mg PO HS PRN; Protocol


   PRN Reason: Insomnia


   Last Admin: 08/01/18 22:34 Dose:  10 mg











- Labs


Labs: 


 





 07/30/18 06:30 





 07/31/18 07:00 





 











PT  12.2 SECONDS (9.4-12.5)   07/27/18  12:15    


 


INR  1.06  (0.93-1.08)   07/27/18  12:15    


 


APTT  26.4 Seconds (25.1-36.5)   07/27/18  12:15    














- Constitutional


Appears: No Acute Distress





- Eye Exam


Eye Exam: Normal appearance





- ENT Exam


ENT Exam: Mucous Membranes Moist





- Respiratory Exam


Respiratory Exam: Decreased Breath Sounds, NORMAL BREATHING PATTERN





- Cardiovascular Exam


Cardiovascular Exam: +S1, +S2





- GI/Abdominal Exam


GI & Abdominal Exam: Soft, Normal Bowel Sounds





- Extremities Exam


Additional comments: 





1+ edema





- Neurological Exam


Neurological Exam: Alert, Awake, Oriented x3





- Psychiatric Exam


Psychiatric exam: Normal Affect





- Skin


Skin Exam: Intact, Warm





Assessment and Plan





- Assessment and Plan (Free Text)


Assessment: 





A 47 year old female obese,who came in to the ER due to shortness of breath. 

History of hypertension and asthma. Non compliant with medications, Seen by 

Psychiatry for anxiety and depression. ID on consult. D-dimer elevated, VQ scan 

done low probability of pulmonary embolism.Renal ultrasound done-unremarkable 

findings.  Exacerbation of asthma. urine positive for opiates and 

benzodiapines.uncontrolled hypertension.Blood culture positive for Staphyloccus 

aureaus, ID on consult, on IV antibiotics.ECHO no evidence of vegetations.normal

, LVEF-65%














Plan: 





Feels good, wanted to go home


Blood pressure improved from SBP of 160's


Improved after adjusting antihypertensive 


  medications however difficult to maintain


Had given Hydralazine this morning for /90


Continue current medications


On Tenormin 25 mg daily,Catapres 0.1 mg TID ,Hydralazine 50 very 12 hours.


 Lovenox 40 mg daily,Apresoline 100 mg q 12 hours,, Microzide  12.5 mg daily


 Cozaar  100 mg daily, 


Blood culture (one set) Staphyloccus aureaus


Continue antibiotics per ID


Pulmonary  on consult


GI on consult for elevated LFT's


Renal on consult


Lifestyle modification


Weight reduction


Continue current treatment


Continue current medications








Will follow up





Plan and treatment discussed with Dr. Grover

## 2018-08-02 NOTE — PN
Copied To:  Zeeshan Mcleod MD

Attending MD:  Zeeshan Mcleod MD



DATE:  08/02/2018



SUBJECTIVE:  The patient is in bed, in no acute distress, nontoxic.



PHYSICAL EXAMINATION:

VITAL SIGNS:  Temperature is 97, blood pressure is 160/90, respiratory rate

of 18, heart rate of 77.

HEENT:  Examination of HEENT is unremarkable.

NECK:  Supple.

LUNGS:  Have decreased breath sounds.

HEART:  Normal S1, S2.

ABDOMEN:  Soft, nontender.



LABORATORY DATA:  Laboratory examination reveals a white count of 16,700,

hemoglobin of 10, platelets of 380.  BUN of 20, creatinine of 0.7.  The

patient's sed rate is 107.  The chemistries are noted.  The patient's

procalcitonin is 0.57 with a C-reactive protein is elevated at 194.  The

urinalysis is noted.  The patient's urine for Legionella antigen is

negative.



ASSESSMENT AND PLAN:  A 47-year-old female, admitted with severe sepsis,

healthcare-associated pneumonia, sensitive Staphylococcus aureus

bacteremia, obesity, body mass index of 38, anxiety _____.  Today is day #5

of antibiotics.  I would complete at least 21 days due to an elevated

C-reactive protein, sedimentation rate.  The patient is complaining of

lower back pain.  We will order an MRI of the spine to rule out diskitis

and/or vertebral osteomyelitis in a patient who has history of chronic back

pain.  My tendency was to treat this patient with prolonged antibiotics

because of her back pain, sedimentation rate, C-reactive protein elevation.

We will order an MRI to rule out diskitis and vertebral osteomyelitis. 

Also order an human immunodeficiency virus test in this 47 year old because

of her age.  We will continue cefazolin.  The patient is also on

prednisone.  Today is day #5 of cefazolin.  Would complete at least 21

days.  If the MRI is positive, will need longer therapy.





__________________________________________

Zeeshan Mcleod MD





DD:  08/02/2018 9:19:27

DT:  08/02/2018 9:22:30

Job # [id number]

## 2018-08-02 NOTE — MRI
Date of service: 



08/02/2018



PROCEDURE:  MR LUMBAR SPINE WITHOUT CONTRAST



HISTORY:

r/o discitis and vert osteo



COMPARISON:

None available. 



TECHNIQUE:

Multiecho multiplanar sequences were performed through the lumbar 

spine without the use of intravenous contrast.



FINDINGS:

Normal lumbar lordosis.



Vertebral body heights are preserved.



Marrow signal unremarkable.



Conus medullaris unremarkable at the level of T12 



Paraspinal soft tissues are unremarkable.



T12-L1:

No disc herniation, spinal canal stenosis or neural foraminal 

narrowing. 



L1-2:

No disc herniation, spinal canal stenosis or neural foraminal 

narrowing. 



L2-3:

No disc herniation, spinal canal stenosis or neural foraminal 

narrowing. 



L3-4:

No disc herniation, spinal canal stenosis or neural foraminal 

narrowing. 



L4-5:

There is severe facet arthropathy.  There is mild disc degeneration.  

There is no significant stenosis 



L5-S1:

No disc herniation, spinal canal stenosis or neural foraminal 

narrowing. 



OTHER FINDINGS:

None. 



IMPRESSION:

L4-5.  Severe facet arthropathy.  Mild disc degeneration. No stenosis.



No acute findings.



No evidence of discitis or osteomyelitis

## 2018-08-03 PROCEDURE — 05HY33Z INSERTION OF INFUSION DEVICE INTO UPPER VEIN, PERCUTANEOUS APPROACH: ICD-10-PCS | Performed by: INTERNAL MEDICINE

## 2018-08-03 RX ADMIN — BUDESONIDE SCH MG: 0.25 SUSPENSION RESPIRATORY (INHALATION) at 08:19

## 2018-08-03 RX ADMIN — OXYCODONE HYDROCHLORIDE PRN MG: 10 TABLET ORAL at 13:35

## 2018-08-03 RX ADMIN — CEFAZOLIN SODIUM SCH MLS/HR: 2 SOLUTION INTRAVENOUS at 15:02

## 2018-08-03 RX ADMIN — Medication SCH TAB: at 09:34

## 2018-08-03 RX ADMIN — GUAIFENESIN SCH MG: 600 TABLET, EXTENDED RELEASE ORAL at 17:57

## 2018-08-03 RX ADMIN — GUAIFENESIN AND CODEINE PHOSPHATE PRN ML: 100; 10 SOLUTION ORAL at 21:37

## 2018-08-03 RX ADMIN — NYSTATIN SCH ML: 100000 SUSPENSION ORAL at 17:57

## 2018-08-03 RX ADMIN — CEFAZOLIN SCH MLS/HR: 330 INJECTION, POWDER, FOR SOLUTION INTRAMUSCULAR; INTRAVENOUS at 05:55

## 2018-08-03 RX ADMIN — NYSTATIN SCH ML: 100000 SUSPENSION ORAL at 09:35

## 2018-08-03 RX ADMIN — NYSTATIN SCH ML: 100000 SUSPENSION ORAL at 21:30

## 2018-08-03 RX ADMIN — GUAIFENESIN SCH MG: 600 TABLET, EXTENDED RELEASE ORAL at 09:34

## 2018-08-03 RX ADMIN — PURIFIED WATER PRN LOZ: 99.05 LIQUID OPHTHALMIC at 05:55

## 2018-08-03 RX ADMIN — ENOXAPARIN SODIUM SCH: 40 INJECTION SUBCUTANEOUS at 09:34

## 2018-08-03 RX ADMIN — GUAIFENESIN AND CODEINE PHOSPHATE PRN ML: 100; 10 SOLUTION ORAL at 14:54

## 2018-08-03 RX ADMIN — OXYCODONE HYDROCHLORIDE PRN MG: 10 TABLET ORAL at 21:37

## 2018-08-03 RX ADMIN — IPRATROPIUM BROMIDE AND ALBUTEROL SULFATE SCH: .5; 3 SOLUTION RESPIRATORY (INHALATION) at 23:50

## 2018-08-03 RX ADMIN — IPRATROPIUM BROMIDE AND ALBUTEROL SULFATE SCH ML: .5; 3 SOLUTION RESPIRATORY (INHALATION) at 16:11

## 2018-08-03 RX ADMIN — IPRATROPIUM BROMIDE AND ALBUTEROL SULFATE SCH ML: .5; 3 SOLUTION RESPIRATORY (INHALATION) at 11:48

## 2018-08-03 RX ADMIN — BUDESONIDE SCH MG: 0.25 SUSPENSION RESPIRATORY (INHALATION) at 20:00

## 2018-08-03 RX ADMIN — NYSTATIN SCH ML: 100000 SUSPENSION ORAL at 14:54

## 2018-08-03 RX ADMIN — CEFAZOLIN SODIUM SCH MLS/HR: 2 SOLUTION INTRAVENOUS at 21:22

## 2018-08-03 RX ADMIN — PURIFIED WATER PRN LOZ: 99.05 LIQUID OPHTHALMIC at 21:39

## 2018-08-03 RX ADMIN — IPRATROPIUM BROMIDE AND ALBUTEROL SULFATE SCH ML: .5; 3 SOLUTION RESPIRATORY (INHALATION) at 08:20

## 2018-08-03 RX ADMIN — IPRATROPIUM BROMIDE AND ALBUTEROL SULFATE SCH ML: .5; 3 SOLUTION RESPIRATORY (INHALATION) at 20:00

## 2018-08-03 NOTE — PN
Copied To:  Yvette Pena MD

Attending MD:  Yvette Pena MD



DATE:  08/03/2018



PULMONARY PROGRESS NOTE



REFERRING PHYSICIAN:  Aries Van MD



SUBJECTIVE:  She is lying in the bed, head at 45 degrees.  Night was

unremarkable.  Breathing is better.  No cough.  No sputum production.  No

nausea, no vomiting, no diarrhea.  No leg pain or leg swelling.



OBJECTIVE:

GENERAL:  In no acute distress.

VITAL SIGNS:  Temperature is 98, heart rate 72, respiratory rate is 20,

blood pressure 148/88, and pulse ox 99% on room air.

HEENT:  Moist mucous membrane.  Crowded airway.

NECK:  Supple.  No JVD.

LUNGS:  Fair airflow with rhonchi.

HEART:  S1 and S2.

ABDOMEN:  Soft and nontender.  No organomegaly.

EXTREMITIES:  No edema.

NEUROLOGIC:  Awake, alert, and follows simple command.



MEDICATIONS:  She is on Ambien 10 mg at bedtime p.r.n., Ancef 2 g IV every

8 hours, hydralazine 10 mg every 6 hours p.r.n., Ativan 0.5 mg every 6

hours p.r.n., Catapres 0.1 mg every 4 hours p.r.n., Catapres 0.1 mg three

times a day, Cepacol lozenges every 2 hours p.r.n., Cozaar 100 mg daily,

vitamin D 50,000 units weekly, DuoNeb every 2 hours p.r.n. and every 4

hours round-the-clock, ferrous gluconate 325 mg three times a day, folic

acid 1 mg daily, chlorthalidone 25 mg daily, Klonopin 0.5 mg twice a day,

Lovenox 40 mg daily, Mucinex  mg twice a day, Nephro vitamins daily,

Norvasc 10 mg daily, nystatin 5 mL four times a day, OxyContin immediate

release 10 mg every 6 hours p.r.n., prednisone 20 mg daily, budesonide

inhaled twice a day, Robitussin with codeine p.r.n. basis, Tenormin 25 mg

twice a day, Tessalon Perles 100 mg three times a day.



LABORATORY DATA:  Reviewed.  No new lab is available.  Microbiology _____

blood culture which was 07/27, one out of two has a Staph aureus.  Repeat

blood culture on 07/29, so far there is no growth.



IMPRESSION AND PLAN:  Exacerbation of chronic obstructive lung disease,

bipolar disorder, hypertension, renal insufficiency, lumbar radiculopathy,

bacteremia.  Spoke to nursing staff.  Midline IV access is being obtained

to continue to treat bacteremia as per Infectious Diseases.  Pulmonary

point of view, she is doing great.  We will decrease prednisone to 10 mg

daily.  Continue inhaled bronchodilator, fall precaution.  Thank you and we

will follow with you.







__________________________________________

Yvette Pena MD



DD:  08/03/2018 16:31:00

DT:  08/03/2018 16:34:39

Job # [id number]

## 2018-08-03 NOTE — CP.PCM.PN
Subjective





- Date & Time of Evaluation


Date of Evaluation: 08/03/18


Time of Evaluation: 06:30





- Subjective


Subjective: 





awake,no distress, denies shortness of breath, dry cough, wanted to go home





Reason for consultation and follow up: Cardiac evaluation for shortness of 

breath, history of hypertension, asthma





Seen and examined by me and Dr. Grover








Objective





- Vital Signs/Intake and Output


Vital Signs (last 24 hours): 


 











Temp Pulse Resp BP Pulse Ox


 


 97.6 F   84   20   137/76   98 


 


 08/02/18 18:00  08/02/18 18:00  08/02/18 18:00  08/02/18 18:00  08/02/18 18:00








Intake and Output: 


 











 08/03/18 08/03/18





 06:59 18:59


 


Intake Total 300 


 


Output Total 2 


 


Balance 298 














- Medications


Medications: 


 Current Medications





Albuterol/Ipratropium (Duoneb 3 Mg/0.5 Mg (3 Ml) Ud)  3 ml IH Q2H PRN


   PRN Reason: Shortness of Breath


   Last Admin: 07/28/18 07:54 Dose:  3 ml


Albuterol/Ipratropium (Duoneb 3 Mg/0.5 Mg (3 Ml) Ud)  3 ml IH [id number] [facility]


   Last Admin: 08/02/18 23:46 Dose:  Not Given


Amlodipine Besylate (Norvasc)  10 mg PO DAILY [facility]


   Last Admin: 08/02/18 09:19 Dose:  10 mg


Atenolol (Tenormin)  25 mg PO BID [facility]


   Last Admin: 08/02/18 17:13 Dose:  25 mg


Benzocaine/Menthol (Cepacol Sore Throat)  1 olivia MT Q2H PRN


   PRN Reason: Sore Throat


   Last Admin: 08/03/18 05:55 Dose:  1 olivia


Benzonatate (Tessalon Perles)  100 mg PO TID [facility]


   Last Admin: 08/02/18 17:13 Dose:  100 mg


Budesonide (Pulmicort Respules)  0.25 mg IH [id number] [facility]


   Last Admin: 08/02/18 19:17 Dose:  0.25 mg


Chlorthalidone (Hygroton)  25 mg PO DAILY [facility]


   Last Admin: 08/02/18 09:17 Dose:  25 mg


Clonazepam (Klonopin)  0.5 mg PO BID [facility]


   PRN Reason: Protocol


   Last Admin: 08/02/18 17:10 Dose:  0.5 mg


Clonidine HCl (Catapres)  0.1 mg PO TID [facility]


   Last Admin: 08/02/18 17:08 Dose:  0.1 mg


Clonidine HCl (Catapres)  0.1 mg PO Q4H PRN


   PRN Reason: Systolic Blood Pressure


Enoxaparin Sodium (Lovenox)  40 mg SC DAILY [facility]


   PRN Reason: Protocol


   Last Admin: 08/02/18 09:18 Dose:  Not Given


Ergocalciferol (Drisdol 50,000 Intl Units Cap)  1 cap PO Q7D [facility]


   Last Admin: 07/31/18 13:11 Dose:  1 cap


Ferrous Gluconate (Fergon)  324 mg PO TID [facility]


   Last Admin: 08/02/18 17:10 Dose:  324 mg


Folic Acid (Folic Acid)  1 mg PO DAILY [facility]


   Last Admin: 08/02/18 09:17 Dose:  1 mg


Guaifenesin (Mucinex La)  600 mg PO BID [facility]


   Last Admin: 08/02/18 17:11 Dose:  600 mg


Guaifenesin/Codeine Phosphate (Robitussin W/Codeine)  5 ml PO Q6H PRN


   PRN Reason: Cough and congestion


   Last Admin: 08/02/18 21:51 Dose:  5 ml


Hydralazine HCl (Apresoline)  10 mg IVP Q6 PRN


   PRN Reason: Systolic Blood Pressure


   Last Admin: 08/02/18 06:33 Dose:  10 mg


Hydralazine HCl (Apresoline)  100 mg PO TID [facility]


   Last Admin: 08/02/18 17:08 Dose:  100 mg


Cefazolin Sodium (Ancef 1gm In Ns)  1 gm in 100 mls @ 100 mls/hr IVPB Q8 [facility]


   PRN Reason: Protocol


   Stop: 08/09/18 14:05


   Last Admin: 08/03/18 05:55 Dose:  100 mls/hr


Lorazepam (Ativan)  0.5 mg IVP Q6H PRN; Protocol


   PRN Reason: Anxiety


   Last Admin: 07/30/18 17:13 Dose:  0.5 mg


Losartan Potassium (Cozaar)  100 mg PO DAILY [facility]


   Last Admin: 08/02/18 09:16 Dose:  100 mg


Nystatin (Nystatin Oral Susp)  5 ml PO QID [facility]


   Last Admin: 08/02/18 21:47 Dose:  5 ml


Oxycodone HCl (Oxycodone Immediate Release Tab)  10 mg PO Q6H PRN


   PRN Reason: Pain, moderate (4-7)


   Last Admin: 08/02/18 21:47 Dose:  10 mg


Prednisone (Prednisone Tab)  20 mg PO DAILY [facility]


   Last Admin: 08/02/18 09:20 Dose:  20 mg


Vitamin B Complex/Vit C/Folic Acid (Nephro-Sydni)  1 tab PO 0800 [facility]


   Last Admin: 08/02/18 09:18 Dose:  1 tab


Zolpidem Tartrate (Ambien)  10 mg PO HS PRN; Protocol


   PRN Reason: Insomnia


   Last Admin: 08/02/18 21:48 Dose:  10 mg











- Labs


Labs: 


 





 07/30/18 06:30 





 07/31/18 07:00 





 











PT  12.2 SECONDS (9.4-12.5)   07/27/18  12:15    


 


INR  1.06  (0.93-1.08)   07/27/18  12:15    


 


APTT  26.4 Seconds (25.1-36.5)   07/27/18  12:15    














- Constitutional


Appears: No Acute Distress





- Eye Exam


Eye Exam: Normal appearance





- ENT Exam


ENT Exam: Mucous Membranes Moist





- Respiratory Exam


Respiratory Exam: Decreased Breath Sounds, NORMAL BREATHING PATTERN





- Cardiovascular Exam


Cardiovascular Exam: +S1, +S2





- GI/Abdominal Exam


GI & Abdominal Exam: Soft, Normal Bowel Sounds





- Extremities Exam


Additional comments: 





1+ edema





- Neurological Exam


Neurological Exam: Alert, Awake, Oriented x3





- Psychiatric Exam


Psychiatric exam: Normal Affect





- Skin


Skin Exam: Dry, Warm





Assessment and Plan





- Assessment and Plan (Free Text)


Assessment: 





A 47 year old female obese,who came in to the ER due to shortness of breath. 

History of hypertension and asthma. Non compliant with medications, Seen by 

Psychiatry for anxiety and depression. ID on consult. D-dimer elevated, VQ scan 

done low probability of pulmonary embolism.Renal ultrasound done-unremarkable 

findings.  Exacerbation of asthma. urine positive for opiates and 

benzodiapines.uncontrolled hypertension.Blood culture positive for Staphyloccus 

aureabby, ID on consult, on IV antibiotics.ECHO no evidence of vegetations.normal

, LVEF-65%

















Plan: 





Feels good, wanted to go home


Controlled blood pressure


Continue current medications


On Tenormin 25 mg daily,Catapres 0.1 mg TID ,Hydralazine 50 very 12 hours.


 Lovenox 40 mg daily,Apresoline 100 mg q 12 hours,, Microzide  12.5 mg daily


 Cozaar  100 mg daily, 


Continue antibiotics per ID


Pulmonary  on consult


Lifestyle modification


Weight reduction


Continue current treatment


Continue current medications


Instructed to be compliant with medications and follow up with physicians.





Will follow up





Plan and treatment discussed with Dr. Grover

## 2018-08-03 NOTE — CP.PCM.PN
Subjective





- Date & Time of Evaluation


Date of Evaluation: 08/03/18


Time of Evaluation: 12:28





- Subjective


Subjective: 





Nephrology Consultation Note:





Assessment: Stable


uncontrolled severe HTN; now better


BENI resolved


asthma exacerbation with acute respi failure and HCAP, sepsis


mild rhabomyolysis


hypertension (years), chronic back pain, asthma and morbid obesity


adjustment disorder


iron def anemia, vit D def





Plan


Hypertension control with meds as ordered. Patient on multiple BP meds, agree 

with thiazide, losartan and norvasc. also on clonidine and atenolol. increased 

hydralazine 100 mg tid


secondary HTN work up negative


abnormal LFT and mild elevated CPK: work up in progress


started weekly Vit D, iron and MVI supplements


GI, ID and pulmonary following





Dose meds/antibiotics for GFR >60. 


Glycemic control


Recommend weight loss, diet, exercise and lifestyle modifications


Further work up/management as per primary team





Thanks for allowing me to participate in care of your patient. Will follow 

patient with you. Please call if any Qs. had d/w team


Dr Didier Villalobos


Office: [phone] Cell: [phone] Fax: [phone]





Chief Complaint; shortness of breath and high BP


reason for consult: HTN and BENI management


HPI: Pt is a 47 F with hx of hypertension (years), chronic back pain, asthma 

and morbid obesity presented with complaints of SOB and is being managed for 

asthma exacerbation, Acute respiratory failure and pneumonia.pt feels better at 

this time. SOB is much better. has cough which is improved. also had abnormal 

LFT and mildly elevated CPK


Denies OTC/herbal meds or NSAIDs


No recent iodinated contrast exposure. 


denies tobacco/smoking/etoh/drugs. 





ROS: c/o chronic pain due to injuries in past


Cardiovascular: No chest pain. 


Pulmonary: improved shortness of breath now


Gastrointestinal: denies abdominal pain No nausea. No vomiting. 


Genitourinary: No pain while urinating. Denies blood in urine. 


All other negative except as mentioned in HPI





Physical Examination:      


General Appearance: Comfortable, in no acute respiratory distress, co-operative 

. obese


Vitals reviewed and noted as below


Head; Atraumatic, normocephalic


ENT: no ulcers no thrush. Tongue is midline. Oropharynx: no rash or ulcers.


EYES: Pupils are equal, round and reactive to light accommodation. Eye muscles 

and extraocular movement intact. Sclera is anicteric.


Neck; supple no lymphadenopathy, no thyromegaly or bruit


Lungs: Normal respiratory rate/effort. Breath sounds bilateral equal and clear


Heart: Normal rate. s1s2 normal. No rub or gallop. 


Extremities: no edema. No varicose veins


Neurological: Patient is alert, awake and oriented to person, place and time. 

No focal deficit. Strength bilateral appropriate and equal


Skin: Warm and dry. Normal turgor. No rash. Palpitation: Normal elasticity for 

age


Abdomen: Abdomen is soft. Bowel sounds +. There is no abdominal tenderness, no 

guarding/rigidity no organomegaly


Psych: normal insight and normal affect/mood


MSK: no joint tenderness or swelling. Digits and nails normal, no deformity


: kidney or bladder not palpable





Labs/imaging reviewed.


Past medical history, past surgical history, family history, social history, 

allergy reviewed and noted as below


Family hx: no hx of CKD. Rest non-contributory 





work up: UA no protein now


Aldosterone 12 renin 0.1 metanephrines WNL renal artery Doppler no evidence for 

KAYE


Vitamin D less than 12.8


Iron saturation 14% ferritin 35








Objective





- Vital Signs/Intake and Output


Vital Signs (last 24 hours): 


 











Temp Pulse Resp BP Pulse Ox


 


 97.6 F   78   18   162/95 H  99 


 


 08/03/18 07:42  08/03/18 09:36  08/03/18 07:42  08/03/18 09:36  08/03/18 07:42








Intake and Output: 


 











 08/03/18 08/03/18





 06:59 18:59


 


Intake Total 300 


 


Output Total 2 


 


Balance 298 














- Medications


Medications: 


 Current Medications





Albuterol/Ipratropium (Duoneb 3 Mg/0.5 Mg (3 Ml) Ud)  3 ml IH Q2H PRN


   PRN Reason: Shortness of Breath


   Last Admin: 07/28/18 07:54 Dose:  3 ml


Albuterol/Ipratropium (Duoneb 3 Mg/0.5 Mg (3 Ml) Ud)  3 ml IH [id number] [facility]


   Last Admin: 08/03/18 11:48 Dose:  3 ml


Amlodipine Besylate (Norvasc)  10 mg PO DAILY [facility]


   Last Admin: 08/03/18 09:34 Dose:  10 mg


Atenolol (Tenormin)  25 mg PO BID [facility]


   Last Admin: 08/03/18 09:36 Dose:  25 mg


Benzocaine/Menthol (Cepacol Sore Throat)  1 olivia MT Q2H PRN


   PRN Reason: Sore Throat


   Last Admin: 08/03/18 05:55 Dose:  1 olivia


Benzonatate (Tessalon Perles)  100 mg PO TID [facility]


   Last Admin: 08/03/18 09:36 Dose:  100 mg


Budesonide (Pulmicort Respules)  0.25 mg IH [id number] [facility]


   Last Admin: 08/03/18 08:19 Dose:  0.25 mg


Chlorthalidone (Hygroton)  25 mg PO DAILY [facility]


   Last Admin: 08/03/18 09:33 Dose:  25 mg


Clonazepam (Klonopin)  0.5 mg PO BID [facility]


   PRN Reason: Protocol


   Last Admin: 08/03/18 09:33 Dose:  0.5 mg


Clonidine HCl (Catapres)  0.1 mg PO TID [facility]


   Last Admin: 08/03/18 09:32 Dose:  0.1 mg


Clonidine HCl (Catapres)  0.1 mg PO Q4H PRN


   PRN Reason: Systolic Blood Pressure


Enoxaparin Sodium (Lovenox)  40 mg SC DAILY [facility]


   PRN Reason: Protocol


   Last Admin: 08/03/18 09:34 Dose:  Not Given


Ergocalciferol (Drisdol 50,000 Intl Units Cap)  1 cap PO Q7D [facility]


   Last Admin: 07/31/18 13:11 Dose:  1 cap


Ferrous Gluconate (Fergon)  324 mg PO TID [facility]


   Last Admin: 08/03/18 09:33 Dose:  324 mg


Folic Acid (Folic Acid)  1 mg PO DAILY [facility]


   Last Admin: 08/03/18 09:33 Dose:  1 mg


Guaifenesin (Mucinex La)  600 mg PO BID [facility]


   Last Admin: 08/03/18 09:34 Dose:  600 mg


Guaifenesin/Codeine Phosphate (Robitussin W/Codeine)  5 ml PO Q6H PRN


   PRN Reason: Cough and congestion


   Last Admin: 08/02/18 21:51 Dose:  5 ml


Hydralazine HCl (Apresoline)  10 mg IVP Q6 PRN


   PRN Reason: Systolic Blood Pressure


   Last Admin: 08/02/18 06:33 Dose:  10 mg


Hydralazine HCl (Apresoline)  100 mg PO TID [facility]


   Last Admin: 08/03/18 09:31 Dose:  100 mg


Cefazolin Sodium (Ancef 1gm In Ns)  1 gm in 100 mls @ 100 mls/hr IVPB Q8 [facility]


   PRN Reason: Protocol


   Stop: 08/09/18 14:05


   Last Admin: 08/03/18 05:55 Dose:  100 mls/hr


Lorazepam (Ativan)  0.5 mg IVP Q6H PRN; Protocol


   PRN Reason: Anxiety


   Last Admin: 07/30/18 17:13 Dose:  0.5 mg


Losartan Potassium (Cozaar)  100 mg PO DAILY [facility]


   Last Admin: 08/03/18 09:32 Dose:  100 mg


Nystatin (Nystatin Oral Susp)  5 ml PO QID [facility]


   Last Admin: 08/03/18 09:35 Dose:  5 ml


Oxycodone HCl (Oxycodone Immediate Release Tab)  10 mg PO Q6H PRN


   PRN Reason: Pain, moderate (4-7)


   Last Admin: 08/02/18 21:47 Dose:  10 mg


Prednisone (Prednisone Tab)  20 mg PO DAILY [facility]


   Last Admin: 08/03/18 09:35 Dose:  20 mg


Vitamin B Complex/Vit C/Folic Acid (Nephro-Sydni)  1 tab PO 0800 [facility]


   Last Admin: 08/03/18 09:34 Dose:  1 tab


Zolpidem Tartrate (Ambien)  10 mg PO HS PRN; Protocol


   PRN Reason: Insomnia


   Last Admin: 08/02/18 21:48 Dose:  10 mg











- Labs


Labs: 


 





 07/30/18 06:30 





 07/31/18 07:00 





 











PT  12.2 SECONDS (9.4-12.5)   07/27/18  12:15    


 


INR  1.06  (0.93-1.08)   07/27/18  12:15    


 


APTT  26.4 Seconds (25.1-36.5)   07/27/18  12:15

## 2018-08-03 NOTE — PN
Copied To:  Yvette Pena MD

Attending MD:  Yvette Pena MD



DATE:  08/02/2018



PULMONARY PROGRESS NOTE



REFERRING PHYSICIAN:  Aries Van MD



SUBJECTIVE:  The patient is sitting in the side of the bed.  Feels better. 

Had some back pain, got MRI.  Breathing is better.  No nausea, vomiting,

diarrhea, leg pain or leg swelling.



OBJECTIVE:

GENERAL:  In no acute distress.

VITAL SIGNS:  Temp is 98, heart rate is 84, respiratory rate is 20, blood

pressure 137/76, pulse ox 98% on room air.

HEENT:  Moist mucous membrane.  Small oral cavity.  Crowded airway.

NECK:  Supple.  No JVD.

LUNGS:  Has a fair airflow with rhonchi.

HEART:  S1 and S2.

ABDOMEN:  Soft and nontender.  No organomegaly.

EXTREMITIES:  No edema.

NEUROLOGIC:  Awake, alert.  Follows simple command.



MEDICATIONS:  She is on Ambien 10 mg at bedtime p.r.n., Ancef 1 g every 8

hours, hydralazine 10 mg every 6 hours p.r.n., Ativan 0.5 mg every 6 hours

p.r.n., clonidine patch 0.1 mg every 4 hours p.r.n., also has a clonidine

0.1 mg three times a day round the clock, Cepacol lozenges every 2 hours

p.r.n., Cozaar 100 mg daily, vitamin D every 7 days, DuoNeb every 2 hours

p.r.n., folic acid 1 mg daily, ferrous gluconate 325 mg 3 times a day,

chlorthalidone 25 mg daily, Lovenox 40 mg daily, Mucinex 600 mg twice a

day, Nephro vitamins daily, Norvasc 10 mg daily, oxycodone immediate

release 10 mg every 6 hours, prednisone 20 mg daily, Pulmicort inhaled

twice a day, Robitussin with codeine 5 mL every 6 hours p.r.n., Tenormin 25

mg twice a day, Tessalon 100 mg three times a day.



LABORATORY DATA:  Reviewed and noted.  No new lab since yesterday.  She has

MRI of the lumbar spine done, which shows L4-L5 severe facet arthropathy,

mild disc degeneration, no stenosis, no acute finding, no evidence of

diskitis and osteomyelitis.



IMPRESSION AND PLAN:  Exacerbation of chronic obstructive lung disease,

bipolar disorder, hypertension, renal insufficiency, lumbar radiculopathy. 

Pulmonary point of view, she is doing well.  May continue taper of

steroids, antibiotics, inhaled bronchodilator.  Advised patient to avoid

respiratory trigger for asthma.  Fall precaution.



Thank you and we will follow with you.





__________________________________________

Yvette Pena MD



DD:  08/02/2018 19:27:35

DT:  08/02/2018 23:46:56

Job # [id number]

## 2018-08-03 NOTE — CP.PCM.PN
Subjective





- Date & Time of Evaluation


Date of Evaluation: 08/03/18


Time of Evaluation: 12:25





- Subjective


Subjective: 





Comfortable in bed, no fevers, not in distress, no nausea, no diarrhea, no 

dysuria.





Objective





- Vital Signs/Intake and Output


Vital Signs (last 24 hours): 


 











Temp Pulse Resp BP Pulse Ox


 


 97.6 F   78   18   162/95 H  99 


 


 08/03/18 07:42  08/03/18 09:36  08/03/18 07:42  08/03/18 09:36  08/03/18 07:42








Intake and Output: 


 











 08/03/18 08/03/18





 06:59 18:59


 


Intake Total 300 


 


Output Total 2 


 


Balance 298 














- Medications


Medications: 


 Current Medications





Albuterol/Ipratropium (Duoneb 3 Mg/0.5 Mg (3 Ml) Ud)  3 ml IH Q2H PRN


   PRN Reason: Shortness of Breath


   Last Admin: 07/28/18 07:54 Dose:  3 ml


Albuterol/Ipratropium (Duoneb 3 Mg/0.5 Mg (3 Ml) Ud)  3 ml IH [id number] [facility]


   Last Admin: 08/03/18 11:48 Dose:  3 ml


Amlodipine Besylate (Norvasc)  10 mg PO DAILY [facility]


   Last Admin: 08/03/18 09:34 Dose:  10 mg


Atenolol (Tenormin)  25 mg PO BID [facility]


   Last Admin: 08/03/18 09:36 Dose:  25 mg


Benzocaine/Menthol (Cepacol Sore Throat)  1 olivia MT Q2H PRN


   PRN Reason: Sore Throat


   Last Admin: 08/03/18 05:55 Dose:  1 olivia


Benzonatate (Tessalon Perles)  100 mg PO TID [facility]


   Last Admin: 08/03/18 09:36 Dose:  100 mg


Budesonide (Pulmicort Respules)  0.25 mg IH [id number] [facility]


   Last Admin: 08/03/18 08:19 Dose:  0.25 mg


Chlorthalidone (Hygroton)  25 mg PO DAILY [facility]


   Last Admin: 08/03/18 09:33 Dose:  25 mg


Clonazepam (Klonopin)  0.5 mg PO BID [facility]


   PRN Reason: Protocol


   Last Admin: 08/03/18 09:33 Dose:  0.5 mg


Clonidine HCl (Catapres)  0.1 mg PO TID [facility]


   Last Admin: 08/03/18 09:32 Dose:  0.1 mg


Clonidine HCl (Catapres)  0.1 mg PO Q4H PRN


   PRN Reason: Systolic Blood Pressure


Enoxaparin Sodium (Lovenox)  40 mg SC DAILY [facility]


   PRN Reason: Protocol


   Last Admin: 08/03/18 09:34 Dose:  Not Given


Ergocalciferol (Drisdol 50,000 Intl Units Cap)  1 cap PO Q7D [facility]


   Last Admin: 07/31/18 13:11 Dose:  1 cap


Ferrous Gluconate (Fergon)  324 mg PO TID [facility]


   Last Admin: 08/03/18 09:33 Dose:  324 mg


Folic Acid (Folic Acid)  1 mg PO DAILY [facility]


   Last Admin: 08/03/18 09:33 Dose:  1 mg


Guaifenesin (Mucinex La)  600 mg PO BID [facility]


   Last Admin: 08/03/18 09:34 Dose:  600 mg


Guaifenesin/Codeine Phosphate (Robitussin W/Codeine)  5 ml PO Q6H PRN


   PRN Reason: Cough and congestion


   Last Admin: 08/02/18 21:51 Dose:  5 ml


Hydralazine HCl (Apresoline)  10 mg IVP Q6 PRN


   PRN Reason: Systolic Blood Pressure


   Last Admin: 08/02/18 06:33 Dose:  10 mg


Hydralazine HCl (Apresoline)  100 mg PO TID [facility]


   Last Admin: 08/03/18 09:31 Dose:  100 mg


Cefazolin Sodium/Dextrose (Ancef Iv 2 Gm Duplex)  2 gm in 50 mls @ 50 mls/hr 

IVPB Q8 [facility]


   PRN Reason: Protocol


   Stop: 08/24/18 14:01


Lorazepam (Ativan)  0.5 mg IVP Q6H PRN; Protocol


   PRN Reason: Anxiety


   Last Admin: 07/30/18 17:13 Dose:  0.5 mg


Losartan Potassium (Cozaar)  100 mg PO DAILY [facility]


   Last Admin: 08/03/18 09:32 Dose:  100 mg


Nystatin (Nystatin Oral Susp)  5 ml PO QID [facility]


   Last Admin: 08/03/18 09:35 Dose:  5 ml


Oxycodone HCl (Oxycodone Immediate Release Tab)  10 mg PO Q6H PRN


   PRN Reason: Pain, moderate (4-7)


   Last Admin: 08/02/18 21:47 Dose:  10 mg


Prednisone (Prednisone Tab)  20 mg PO DAILY [facility]


   Last Admin: 08/03/18 09:35 Dose:  20 mg


Vitamin B Complex/Vit C/Folic Acid (Nephro-Sydni)  1 tab PO 0800 [facility]


   Last Admin: 08/03/18 09:34 Dose:  1 tab


Zolpidem Tartrate (Ambien)  10 mg PO HS PRN; Protocol


   PRN Reason: Insomnia


   Last Admin: 08/02/18 21:48 Dose:  10 mg











- Labs


Labs: 


 





 07/30/18 06:30 





 07/31/18 07:00 





 











PT  12.2 SECONDS (9.4-12.5)   07/27/18  12:15    


 


INR  1.06  (0.93-1.08)   07/27/18  12:15    


 


APTT  26.4 Seconds (25.1-36.5)   07/27/18  12:15    














- Constitutional


Appears: Non-toxic, Chronically Ill





- Head Exam


Head Exam: NORMAL INSPECTION





- Respiratory Exam


Respiratory Exam: Decreased Breath Sounds





- Cardiovascular Exam


Cardiovascular Exam: +S1, +S2





- GI/Abdominal Exam


GI & Abdominal Exam: Soft.  absent: Tenderness





Assessment and Plan





- Assessment and Plan (Free Text)


Plan: 





Assessment


Methicillin-sensitive Staph aureus bacteremia, source unclear, 2D echo is 

negative


S/P severe sepsis due to healthcare-associated pneumonia with possible gram 

positive cocci and/or gram negative bacilli and/or atypical organisms


chronic asthma


obesity with BMI 38


anxiety


bipolar disorder


HTN





Plan


repeat blood cx are negative - will continue Cefazolin (day 6 from 1st negative 

blood cx) to complete 21 days of therapy with outpatient follow up with PMD


discussed with patient

## 2018-08-03 NOTE — PN
Copied To:  Aries Van MD

Attending MD:  Aries Van MD



DATE:  08/02/2018



SUBJECTIVE:  Patient is comfortable.  No distress.  Her pain issue has been

addressed.  She is on Percocet three times a day.  Breathing is better and

she feels better.  Discussed with the patient.  She need to stay because of

her bacteremia and possibility for osteomyelitis and positive blood

cultures x2.



PHYSICAL EXAMINATION:

VITAL SIGNS:  Temperature 97.6, heart rate 84, blood pressure 137/76,

respirations 20, saturation 98%.

HEAD AND NECK:  Normal.  No JVD.  No thyromegaly

CHEST:  Clear bilaterally.

CARDIAC:  First sound and second sound normal.

ABDOMEN:  Soft, obese, nontender.

EXTREMITIES:  No edema.

NEUROLOGIC:  Normal.



DATA:  Last lab shows white count 16.7, hemoglobin 10.1, hematocrit 31.8,

platelets 380.  Chemistry shows sodium 143, potassium 4.2, chloride 104,

bicarbonate 30, BUN 20, creatinine 0.7.  Patient has low iron, 14, low iron

saturation.  Liver enzymes are improving, only ALT is 73.



IMPRESSION AND PLAN:

1.  Sepsis, etiology unclear.  Patient has leukocytosis.  She had a fever. 

She has respiratory distress and she has positive blood culture.  Urine

bacteremia x2, Staphylococcus aureus coagulase positive.  We will continue

current therapy as per Dr. Mcleod.  Current medications, the patient is

getting Ancef, which she states 1 g IV every 8 hours and MRI of the spine

since the patient complained of back pain.

2.  Acute asthma exacerbation, has been improving.  She is currently on

p.o.  We will switch IV Solu-Medrol to p.o. prednisone.  Continue inhaled

bronchodilator.

3.  Hypertension, difficult to control.  Discussed with  _____. 

Continue current therapy as it is, clonidine 0.1 mg every four hours

p.r.n., hydralazine 100 mg t.i.d. and 10 mg IV every 6 hours p.r.n. 

Patient also getting losartan 100 mg daily.  She is getting chlorthalidone

25 mg p.o. daily.  She is getting Norvasc 10 mg p.o. daily, atenolol 25

b.i.d.

4.  Patient is moving around, getting physical therapy, chest PT and she is

getting also physical therapy.  Continue current therapy.  Continue GI and

DVT prophylaxis.  Patient will need positive blood culture, need to be

treated at least six weeks as per Dr. Mcleod's recommendations.  We

will follow up on that and continue current therapy for now.  Case has been

discussed with the patient.  Continue current pain medicines.  Continue her

other medicines, Klonopin 0.5 mg b.i.d. and oxycodone 10 mg every 6 hours

p.r.n.







__________________________________________

Aries Van MD





DD:  08/03/2018 10:59:19

DT:  08/03/2018 13:01:29

Job # [id number]

## 2018-08-04 VITALS
HEART RATE: 78 BPM | OXYGEN SATURATION: 95 % | DIASTOLIC BLOOD PRESSURE: 82 MMHG | SYSTOLIC BLOOD PRESSURE: 145 MMHG | TEMPERATURE: 97.7 F | RESPIRATION RATE: 20 BRPM

## 2018-08-04 RX ADMIN — OXYCODONE HYDROCHLORIDE PRN MG: 10 TABLET ORAL at 06:41

## 2018-08-04 RX ADMIN — Medication SCH TAB: at 09:43

## 2018-08-04 RX ADMIN — IPRATROPIUM BROMIDE AND ALBUTEROL SULFATE SCH: .5; 3 SOLUTION RESPIRATORY (INHALATION) at 03:09

## 2018-08-04 RX ADMIN — GUAIFENESIN AND CODEINE PHOSPHATE PRN ML: 100; 10 SOLUTION ORAL at 06:41

## 2018-08-04 RX ADMIN — ENOXAPARIN SODIUM SCH: 40 INJECTION SUBCUTANEOUS at 09:42

## 2018-08-04 RX ADMIN — IPRATROPIUM BROMIDE AND ALBUTEROL SULFATE SCH ML: .5; 3 SOLUTION RESPIRATORY (INHALATION) at 11:10

## 2018-08-04 RX ADMIN — OXYCODONE HYDROCHLORIDE PRN MG: 10 TABLET ORAL at 10:56

## 2018-08-04 RX ADMIN — CEFAZOLIN SODIUM SCH MLS/HR: 2 SOLUTION INTRAVENOUS at 06:11

## 2018-08-04 RX ADMIN — GUAIFENESIN SCH MG: 600 TABLET, EXTENDED RELEASE ORAL at 09:42

## 2018-08-04 RX ADMIN — BUDESONIDE SCH MG: 0.25 SUSPENSION RESPIRATORY (INHALATION) at 07:56

## 2018-08-04 RX ADMIN — IPRATROPIUM BROMIDE AND ALBUTEROL SULFATE SCH ML: .5; 3 SOLUTION RESPIRATORY (INHALATION) at 07:57

## 2018-08-04 RX ADMIN — NYSTATIN SCH ML: 100000 SUSPENSION ORAL at 09:43

## 2018-08-04 RX ADMIN — PURIFIED WATER PRN LOZ: 99.05 LIQUID OPHTHALMIC at 06:41

## 2018-08-04 NOTE — PN
Copied To:  Zeeshan Mcleod MD

Attending MD:  Zeeshan Mcleod MD



DATE:  08/04/2018



SUBJECTIVE:  The patient is seen earlier today, in no acute distress,

nontoxic.



PHYSICAL EXAMINATION:

VITAL SIGNS:  Temperature is 99, blood pressure is 114/70, respiratory rate

of 16.

HEENT:  Examination of HEENT is unremarkable.

NECK:  Supple.

LUNGS:  Have decreased breath sounds.

HEART:  Normal S1, S2.

ABDOMEN:  Soft, nontender.



LABORATORY DATA:  Laboratory examination reveals the patient's white count

of 16,700, sed rate of 107.  BUN of 20, creatinine of 0.7.  Urinalysis is

noted.  Toxicology is reviewed.  Immunology is noted.  Serology is

reviewed.  Microbiology reveals the cultures are negative.



ASSESSMENT AND PLAN:  A 47-year-old female with sensitive Staphylococcus

aureus bacteremia and severe sepsis and healthcare-associated pneumonia. 

Negative MRI of the spine in a patient with anxiety, bipolar, hypertension,

source of the bacteremia is not entirely clear.  On cefazolin day #7, will

need at least 21 days of therapy, maybe longer.  Follow the C-reactive

protein and sedimentation rate.  Review of orders reveals the patient to be

on cefazolin at 2 g every 8 hours.





__________________________________________

Zeeshan Mcleod MD





DD:  08/04/2018 8:55:45

DT:  08/04/2018 8:56:50

Job # [id number]

## 2018-08-06 NOTE — PN
Copied To:  Aries Van MD

Attending MD:  Aries Van MD



DATE:  08/03/2018



SUBJECTIVE:  The patient seems doing very well, had a PICC line put in. 

She has no chest pain.  No short of breath.  Will be discharged in the

morning.  The patient is getting Ancef 1 g IV every 8 hours.



PHYSICAL EXAMINATION:

VITAL SIGNS:  Her temperature is 97.6, heart rate 78, blood pressure is

162/98, respirations 18, saturation 99%.

HEAD AND NECK:  Normal.  No JVD.  No thyromegaly.

CHEST:  Clear bilaterally.

CARDIAC:  First sound and second sound are normal.  No murmur, rub or

gallop.

ABDOMEN:  Soft, obese, and nontender.

EXTREMITIES:  No edema.

NEUROLOGIC:  Normal.



LABORATORY DATA:  The patient has laboratory study, which shows no labs

done.  Sed rate 107 and she has also high C-reactive protein.



IMPRESSION:

1.  Sepsis, etiology unclear, could be lung etiology; however, the patient

had multiple cultures, which two of them shows gram-positive cocci.  The

patient had echocardiography, which was negative and she had also MRI of

her back, which was severe facet arthropathy, mild disk degeneration,

otherwise, no diskitis or osteomyelitis.  We will continue IV antibiotics

as recommended by Infectious Disease consult for six weeks minimum or more.

Source is unclear.  The patient also seen by Cardiology, Dr. Grover.

2.  Hypertension, better controlled.  We will continue current blood

pressure medications.

3.  Chronic osteoarthritis, back pain, osteoarthritis of the knee. 

Continue current medications.  The patient also had acute asthma

exacerbation.  Continue prednisone.  Continue inhaled bronchodilators.  We

will follow up clinically.



PLAN:  Continue current therapy.  Will discharge in the morning.  Current

medications, Percocet.  She had hydrochlorothiazide.  She has Protonix 40

and Singulair.  She had prednisone 10 mg twice a day, Cozaar 100, Ativan

0.5 b.i.d., Claritin 10, Advair, DuoNeb plus Pulmicort, and Tenormin 100

b.i.d.  The patient also has Norvasc 10 mg.  We will continue current

therapy.  We will follow up clinically.  The patient should be discharged

in the morning and her medications will be sent to the pharmacy.





__________________________________________

Aries Van MD



DD:  08/05/2018 21:57:04

DT:  08/06/2018 0:09:21

[facility] # [id number]

## 2018-08-06 NOTE — DS
Copied To:  Aries Van MD

Attending MD:  Aries Van MD



HISTORY OF PRESENT ILLNESS:  The patient is admitted with acute asthma

exacerbation, severe leukocytosis, high Sed rate, high C-reactive protein. 

The patient was seen by ID consult, Dr. Mcleod.  Blood cultures and

urine cultures. Had positive blood culture, gram-positive cocci, Staph

aureus, was being treated by vancomycin and meropenem.  We then switched

later to Ancef 1 g IV every 8 hours.  The patient had multiple tests

included an ultrasound of the gallbladder, which was negative.  She had

also hepatitis profile, which is negative.  Her BUN and creatinine was

elevated and went down by hydration.  She also had V/Q scan and was

negative, low probability.  She also had MRI of the spine, lumbosacral,

which was negative for osteomyelitis. Patient was seen in renal consults,

ID consults, pulmonary consults, and multiple consultations were seen and

cardiology consult.  The patient will be discharged to home.



DISCHARGE DIAGNOSES:

1.  Acute asthma exacerbation.

2.  Gram-positive coccemia.

3.  Chronic anxiety.

4.  Difficult controlled hypertension.

5.  Obesity.

6.  Osteoarthritis and chronic back pain.

7.  Insomnia.



PLAN:  Discharge the patient home.  She got multiple blood pressure

medicines.  She got chlorthalidone 25 mg.  She got Cozaar 100 mg.  She got

Norvasc 10 mg, hydralazine 100 mg t.i.d., and Klonopin 0.5 mg b.i.d. p.r.n.

She got also Protonix.  She got prednisone 10 mg for 2 days, then 5 mg for

2 days, then stop.  The patient also got a nebulizer treatment, inhalers,

Ventolin two puffs 4 times a day.  She does have these prescriptions

before.  She is to continue all prescribed medications.  Continue IV

antibiotics, Ancef IV every 8 hours for 6 weeks.  Follow up as an

outpatient.





__________________________________________

Aries Van MD



DD:  08/05/2018 22:02:04

DT:  08/06/2018 1:51:10

Job # [id number]

## 2018-08-11 ENCOUNTER — HOSPITAL ENCOUNTER (EMERGENCY)
Dept: HOSPITAL 42 - ED | Age: 48
Discharge: HOME | End: 2018-08-11
Payer: MEDICAID

## 2018-08-11 VITALS — DIASTOLIC BLOOD PRESSURE: 85 MMHG | HEART RATE: 82 BPM | SYSTOLIC BLOOD PRESSURE: 137 MMHG

## 2018-08-11 VITALS — BODY MASS INDEX: 41.1 KG/M2

## 2018-08-11 VITALS — OXYGEN SATURATION: 97 % | TEMPERATURE: 98.4 F | RESPIRATION RATE: 18 BRPM

## 2018-08-11 DIAGNOSIS — R21: Primary | ICD-10-CM

## 2018-08-11 NOTE — ED PDOC
Arrival/HPI





- General


Chief Complaint: Abnormal Skin Integrity


Time Seen by Provider: 08/11/18 16:31


Historian: Patient





- History of Present Illness


Narrative History of Present Illness (Text): 





08/11/18 17:55


47yr old female presents today with rash to area between breast and on back 

since last night. pt denies fever/chills. denies pruritis. denies cp or sob. no 

vomiting/diarrhea. pt denies fatigue or weakness. pt states she is on IV abx 

via picc line for bacteremia. pt states she has been feeling much better since 

being out of the hospital. she denies new soaps, lotions, detergents, perfumes.

  denies pain. no other complaints. 





Past Medical History





- Provider Review


Nursing Documentation Reviewed: Yes





- Travel History


Have you recently traveled outside [place] w/in the past 3 mons?: No





- Infectious Disease


Hx of Infectious Diseases: None





- Tetanus Immunization


Tetanus Immunization: Unknown





- Reproductive


Menopause: No





- Past Medical History


Past Medical History: No Previous





- Cardiac


Hx Cardiac Disorders: Yes


Hx Hypertension: Yes


Hx Pacemaker: No





- Pulmonary


Hx Respiratory Disorders: Yes


Hx Asthma: Yes





- Neurological


Hx Neurological Disorder: No





- HEENT


Hx HEENT Disorder: No





- Renal


Hx Renal Disorder: No





- Endocrine/Metabolic


Hx Endocrine Disorders: No





- Hematological/Oncological


Hx Cancer: No





- Integumentary


Hx Dermatological Disorder: Yes


Other/Comment: bruising multiple left arm





- Musculoskeletal/Rheumatological


Hx Falls: No





- Gastrointestinal


Hx Gastrointestinal Disorders: Yes (obese)


Hx Gastroesophageal Reflux: Yes





- Genitourinary/Gynecological


Hx Genitourinary Disorders: No





- Psychiatric


Hx Psychophysiologic Disorder: Yes


Hx Anxiety: Yes


Hx Depression: Yes (loss of  2018)


Hx Emotional Abuse: No


Hx Physical Abuse: No


Hx Substance Use: No


Other/Comment: previous suicide attempt 2018 pills;





- Past Surgical History


Past Surgical History: No Previous





- Surgical History


Hx Mastectomy: No





- Anesthesia


Hx Anesthesia: No


Hx Anesthesia Reactions: No





- Suicidal Assessment


Feels Threatened In Home Enviroment: No





Family/Social History





- Physician Review


Nursing Documentation Reviewed: Yes


Family/Social History: Unknown Family HX


Smoking Status: Never Smoked


Hx Alcohol Use: No


Hx Substance Use: No


Hx Substance Use Treatment: No





Allergies/Home Meds


Allergies/Adverse Reactions: 


Allergies





No Known Allergies Allergy (Verified 07/27/18 11:56)


 











Review of Systems





- Review of Systems


Constitutional: absent: Fatigue, Fevers


Respiratory: absent: SOB, Cough


Cardiovascular: absent: Chest Pain, Palpitations


Gastrointestinal: absent: Abdominal Pain, Nausea, Vomiting


Genitourinary Female: absent: Dysuria


Musculoskeletal: absent: Arthralgias


Skin: Rash.  absent: Pruritis


Neurological: absent: Headache, Dizziness





Physical Exam


Vital Signs Reviewed: Yes


Vital Signs











  Temp Pulse Resp BP Pulse Ox


 


 08/11/18 16:50  98.4 F  86  18  136/89  97











Temperature: Afebrile


Blood Pressure: Normal


Pulse: Regular


Respiratory Rate: Normal


Appearance: Positive for: Well-Appearing, Non-Toxic, Comfortable


Pain Distress: None


Mental Status: Positive for: Alert and Oriented X 3





- Systems Exam


Head: Present: Atraumatic


Mouth: Present: Moist Mucous Membranes


Neck: Present: Normal Range of Motion


Respiratory/Chest: Present: Clear to Auscultation, Good Air Exchange.  No: 

Respiratory Distress, Accessory Muscle Use


Cardiovascular: Present: Regular Rate and Rhythm, Normal S1, S2.  No: Murmurs


Upper Extremity: Present: Normal ROM, Other (picc line in place without 

erythema. )


Lower Extremity: Present: Normal ROM


Neurological: Present: GCS=15, Speech Normal


Skin: Present: Warm, Dry, Rashes (there are few small pustules noted to the 

anterior chest between the breasts without surrounding erythema; non tender.   

there are a few sporatic erythematous papules noted to the lower back 

bilaterally. ), Normal Color


Psychiatric: Present: Alert, Oriented x 3





Medical Decision Making


ED Course and Treatment: 





08/11/18 18:04


pt is non toxic well appearing; no distress. 





pt is with non specific rash on to the anterior chest between the breasts and 

to the lower back. 


does not appear to be allergic.





pt was seen and evaluated by dr. magallanes.








case was discussed with dr. singletary. will send home to f/u with dr singletary in the 

office. 





pt was advised to f/u with dr. singletary. pt was advised to return if symptoms 

worsen, persist or if new symptoms develop. 








Patient verbalizes understanding of discharge instructions and need for 

immediate followup.








all aspects of this case were discussed the attending of record. 








Impression: rash


Follow up with dr. singletary within the next 2 days


Follow up with the dermatologist within the next 2 days


Return if symptoms worsen,persist or if new symptoms develop








Disposition/Present on Arrival





- Present on Arrival


Any Indicators Present on Arrival: No


History of DVT/PE: No


History of Uncontrolled Diabetes: No


Urinary Catheter: No


History of Decub. Ulcer: No


History Surgical Site Infection Following: None





- Disposition


Have Diagnosis and Disposition been Completed?: Yes


Diagnosis: 


 Rash





Disposition: HOME/ ROUTINE


Disposition Time: 17:35


Patient Plan: Discharge


Patient Problems: 


 Current Active Problems











Problem Status Onset


 


Rash Acute  











Condition: GOOD


Discharge Instructions (ExitCare):  Skin Rash (DC)


Additional Instructions: 


Follow up with dr. singletary within the next 2 days


Follow up with the dermatologist within the next 2 days


Return if symptoms worsen,persist or if new symptoms develop. 


Referrals: 


Aries Singletary MD [Primary Care Provider] - Follow up with primary


Favian Chadwick MD [Staff Provider] - Follow up with primary


Raquel Narayan MD [Staff Provider] - Follow up with primary


Forms:  [facility] (English)

## 2018-08-23 ENCOUNTER — HOSPITAL ENCOUNTER (INPATIENT)
Dept: HOSPITAL 42 - ED | Age: 48
LOS: 4 days | Discharge: HOME | DRG: 569 | End: 2018-08-27
Attending: INTERNAL MEDICINE | Admitting: INTERNAL MEDICINE
Payer: MEDICAID

## 2018-08-23 VITALS — BODY MASS INDEX: 41.1 KG/M2

## 2018-08-23 DIAGNOSIS — F41.9: ICD-10-CM

## 2018-08-23 DIAGNOSIS — B95.61: ICD-10-CM
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
## 2018-08-23 DIAGNOSIS — J45.909: ICD-10-CM

## 2018-08-23 DIAGNOSIS — G89.4: ICD-10-CM

## 2018-08-23 DIAGNOSIS — I80.8: ICD-10-CM

## 2018-08-23 DIAGNOSIS — G47.00: ICD-10-CM

## 2018-08-23 DIAGNOSIS — E66.01: ICD-10-CM

## 2018-08-23 DIAGNOSIS — M54.9: ICD-10-CM

## 2018-08-23 DIAGNOSIS — I10: ICD-10-CM

## 2018-08-23 DIAGNOSIS — F31.9: ICD-10-CM

## 2018-08-23 DIAGNOSIS — K21.9: ICD-10-CM

## 2018-08-23 DIAGNOSIS — R78.81: ICD-10-CM

## 2018-08-23 DIAGNOSIS — D64.9: ICD-10-CM

## 2018-08-23 DIAGNOSIS — T82.898A: Primary | ICD-10-CM

## 2018-08-24 LAB
ALBUMIN SERPL-MCNC: 4 G/DL (ref 3–4.8)
ALBUMIN/GLOB SERPL: 1.3 {RATIO} (ref 1.1–1.8)
ALT SERPL-CCNC: 25 U/L (ref 7–56)
AST SERPL-CCNC: 34 U/L (ref 14–36)
BASOPHILS # BLD AUTO: 0.04 K/MM3 (ref 0–2)
BASOPHILS NFR BLD: 0.4 % (ref 0–3)
BUN SERPL-MCNC: 8 MG/DL (ref 7–21)
CALCIUM SERPL-MCNC: 9 MG/DL (ref 8.4–10.5)
EOSINOPHIL # BLD: 0.3 10*3/UL (ref 0–0.7)
EOSINOPHIL NFR BLD: 2.8 % (ref 1.5–5)
ERYTHROCYTE [DISTWIDTH] IN BLOOD BY AUTOMATED COUNT: 15.9 % (ref 11.5–14.5)
GFR NON-AFRICAN AMERICAN: > 60
GRANULOCYTES # BLD: 6.21 10*3/UL (ref 1.4–6.5)
GRANULOCYTES NFR BLD: 64.2 % (ref 50–68)
HGB BLD-MCNC: 9.3 G/DL (ref 12–16)
LYMPHOCYTES # BLD: 2.4 10*3/UL (ref 1.2–3.4)
LYMPHOCYTES NFR BLD AUTO: 25.1 % (ref 22–35)
MCH RBC QN AUTO: 28.7 PG (ref 25–35)
MCHC RBC AUTO-ENTMCNC: 31.8 G/DL (ref 31–37)
MCV RBC AUTO: 90.1 FL (ref 80–105)
MONOCYTES # BLD AUTO: 0.7 10*3/UL (ref 0.1–0.6)
MONOCYTES NFR BLD: 7.5 % (ref 1–6)
PLATELET # BLD: 343 10^3/UL (ref 120–450)
PMV BLD AUTO: 8.6 FL (ref 7–11)
RBC # BLD AUTO: 3.24 10^6/UL (ref 3.5–6.1)
WBC # BLD AUTO: 9.7 10^3/UL (ref 4.5–11)

## 2018-08-24 RX ADMIN — CEFAZOLIN SODIUM SCH MLS/HR: 2 SOLUTION INTRAVENOUS at 05:59

## 2018-08-24 RX ADMIN — TRAMADOL HYDROCHLORIDE AND ACETAMINOPHEN SCH TAB: 37.5; 325 TABLET, COATED ORAL at 05:59

## 2018-08-24 RX ADMIN — TRAMADOL HYDROCHLORIDE AND ACETAMINOPHEN SCH: 37.5; 325 TABLET, COATED ORAL at 15:35

## 2018-08-24 RX ADMIN — TRIAMTERENE AND HYDROCHLOROTHIAZIDE SCH CAP: 25; 37.5 CAPSULE ORAL at 10:43

## 2018-08-24 RX ADMIN — ENOXAPARIN SODIUM SCH MG: 100 INJECTION SUBCUTANEOUS at 17:29

## 2018-08-24 RX ADMIN — CEFAZOLIN SODIUM SCH MLS/HR: 2 SOLUTION INTRAVENOUS at 22:02

## 2018-08-24 RX ADMIN — BUDESONIDE SCH: 0.5 SUSPENSION RESPIRATORY (INHALATION) at 07:41

## 2018-08-24 RX ADMIN — ARFORMOTEROL TARTRATE SCH: 15 SOLUTION RESPIRATORY (INHALATION) at 07:41

## 2018-08-24 RX ADMIN — BUDESONIDE SCH MG: 0.5 SUSPENSION RESPIRATORY (INHALATION) at 20:12

## 2018-08-24 RX ADMIN — OXYCODONE HYDROCHLORIDE PRN MG: 30 TABLET ORAL at 22:03

## 2018-08-24 RX ADMIN — CEFAZOLIN SODIUM SCH MLS/HR: 2 SOLUTION INTRAVENOUS at 13:29

## 2018-08-24 RX ADMIN — ARFORMOTEROL TARTRATE SCH MCG: 15 SOLUTION RESPIRATORY (INHALATION) at 20:12

## 2018-08-24 NOTE — CP.PCM.CON
History of Present Illness





- History of Present Illness


History of Present Illness: 


47 year old female with PMH of severe sepsis due to healthcare-associated 

pneumonia with possible gram positive cocci and/or gram negative bacilli and/or 

atypical organisms, chronic asthma, obesity with BMI 38, anxiety. bipolar 

disorder, HTN was recently in [facility] because of MSSA bacteremia and was 

recommended to get 4-6 weeks of antibiotics. She was doing well with her PICC 

line until she started having some swelling of her left arm and the PICC line 

is clogged. She denies fever or chills, no nausea or vomiting, no chest pain, 

no SOB, no headache or dizziness, no cough or rhinorrhea, no abdominal pain, no 

diarrhea, no dysuria. Infectious Diseases consult is requested to further 

evaluate and manage.





Review of Systems





- Review of Systems


All systems: reviewed and no additional remarkable complaints except (as per HPI

)





Past Patient History





- Infectious Disease


Hx of Infectious Diseases: None





- Tetanus Immunizations


Tetanus Immunization: Unknown





- Past Social History


Smoking Status: Never Smoked





- CARDIAC


Hx Cardiac Disorders: Yes


Hx Hypertension: Yes


Hx Pacemaker: No





- PULMONARY


Hx Respiratory Disorders: Yes


Hx Asthma: Yes





- NEUROLOGICAL


Hx Neurological Disorder: No





- HEENT


Hx HEENT Problems: No





- RENAL


Hx Chronic Kidney Disease: No





- ENDOCRINE/METABOLIC


Hx Endocrine Disorders: No





- HEMATOLOGICAL/ONCOLOGICAL


Hx Cancer: No





- INTEGUMENTARY


Hx Dermatological Problems: Yes


Other/Comment: bruising multiple left arm





- MUSCULOSKELETAL/RHEUMATOLOGICAL


Hx Falls: No





- GASTROINTESTINAL


Hx Gastrointestinal Disorders: Yes (obese)


Hx Gastroesophageal Reflux: Yes





- GENITOURINARY/GYNECOLOGICAL


Hx Genitourinary Disorders: No





- PSYCHIATRIC


Hx Psychophysiologic Disorder: Yes


Hx Anxiety: Yes


Hx Depression: Yes (loss of  2018)


Hx Emotional Abuse: No


Hx Physical Abuse: No


Hx Substance Use: No


Other/Comment: previous suicide attempt 2018 pills;





- SURGICAL HISTORY


Hx Mastectomy: No





- ANESTHESIA


Hx Anesthesia: No


Hx Anesthesia Reactions: No





Meds


Allergies/Adverse Reactions: 


 Allergies











Allergy/AdvReac Type Severity Reaction Status Date / Time


 


No Known Allergies Allergy   Verified 07/27/18 11:56














- Medications


Medications: 


 Current Medications





Amitriptyline HCl (Elavil)  25 mg PO HS JEF


Arformoterol Tartrate (Brovana)  15 mcg IH [id number] JEF


Atenolol (Tenormin)  25 mg PO BID JEF


Budesonide (Pulmicort Respules)  0.5 mg IH [id number] [facility]


Enoxaparin Sodium (Lovenox)  40 mg SC DAILY JEF


   PRN Reason: Protocol


Cefazolin Sodium/Dextrose (Ancef Iv 2 Gm Duplex)  2 gm in 50 mls @ 50 mls/hr 

IVPB Q8 [facility]


   Last Admin: 08/24/18 05:59 Dose:  50 mls/hr


Lorazepam (Ativan)  0.5 mg PO BID PRN; Protocol


   PRN Reason: Anxiety


Losartan Potassium (Cozaar)  100 mg PO DAILY [facility]


Montelukast Sodium (Singulair)  10 mg PO DAILY [facility]


Pantoprazole Sodium (Protonix Inj)  40 mg IVP DAILY [facility]


Tramadol/Acetaminophen (Ultracet 37.5/325 Mg)  1 tab PO Q6 [facility]


   Last Admin: 08/24/18 05:59 Dose:  1 tab


Triamterene/HCTZ (Dyazide 25 Mg-37.5 Mg)  1 cap PO DAILY [facility]











Physical Exam





- Constitutional


Appears: Non-toxic, No Acute Distress, Chronically Ill





- Head Exam


Head Exam: NORMAL INSPECTION





- ENT Exam


ENT Exam: Mucous Membranes Moist





- Neck Exam


Neck exam: Negative for: Meningismus





- Respiratory Exam


Respiratory Exam: Decreased Breath Sounds





- Cardiovascular Exam


Cardiovascular Exam: +S1, +S2





- GI/Abdominal Exam


GI & Abdominal Exam: Soft.  absent: Tenderness





- Extremities Exam


Additional comments: 





mild swelling of left arm





Results





- Vital Signs


Recent Vital Signs: 


 Last Vital Signs











Temp  98 F   08/24/18 03:10


 


Pulse  70   08/24/18 03:10


 


Resp  20   08/24/18 03:10


 


BP  120/74   08/24/18 03:10


 


Pulse Ox  98   08/24/18 02:53














- Labs


Result Diagrams: 


 08/24/18 00:35





 08/24/18 00:35





Assessment & Plan





- Assessment and Plan (Free Text)


Plan: 





Assessment


Methicillin-sensitive Staph aureus bacteremia, source unclear, 2D echo is 

negative


superficial thrombophlebitis of left basilic vein on left arm


S/P severe sepsis due to healthcare-associated pneumonia with possible gram 

positive cocci and/or gram negative bacilli and/or atypical organisms


chronic asthma


obesity with BMI 38


anxiety


bipolar disorder


HTN





Plan


will repeat blood cx


will continue Cefazolin (day 27 from 1st negative blood cx) to complete 4-6 

weeks of therapy with outpatient follow up with PMD


anticoagulation as per medical team


will monitor clinically

## 2018-08-24 NOTE — CP.PCM.HP
<Bijan Lemon - Last Filed: 18 06:18>





History of Present Illness





- History of Present Illness


History of Present Illness: 


Bijan Lemon, PGY-1, Internal Medicine History and Physical For Dr. Galvez





CC: Malfunctioning PICC line





47 year old female with past medical history of hypertension, gram positive 

coccemia, asthma, obesity, osteoarthritis, chronic back pain, and insomnia 

presents with malfunctioning PICC. Patient was discharged on 18 with ancef 

IV Q8 hours through a PICC line as per Dr. Van for 6 weeks for gram positive 

coccemia. Today, patient presents with malfunctioning PICC line. The PICC line 

is clogged and the antibiotics have been not been going through. As a result, 

patient was told to come to the ER. Patient reports left upper extremity 

tenderness and erythema, but no warmth or discharge. Patient denies as chest 

pain, shortness of breath, nausea, vomiting, constipation, diarrhea, dysuria, 

hematuria. 12-point ROS was negative except for what was listed above.





PMH: as stated above


PSH: denies


Allergies: NKDA


FMHx: grandmother  from breast cancer. Grandfather  from ACS. 

Uncle had throat cancer.


Social: Patient denies smoking, drinking, or recreational drug history.





PMD: Dr. Van


Pharmacy: [facility]


Insurance: [employer]








Present on Admission





- Present on Admission


Any Indicators Present on Admission: No


History of DVT/PE: No


History of Uncontrolled Diabetes: No





Review of Systems





- Constitutional


Constitutional: absent: Anorexia, Chills, Fever





- EENT


Eyes: absent: Blurred Vision


Ears: absent: Ear Discharge


Nose/Mouth/Throat: absent: Nasal Congestion





- Cardiovascular


Cardiovascular: absent: Chest Pain, Chest Pain at Rest, Dyspnea on Exertion





- Respiratory


Respiratory: absent: Cough, Dyspnea, Hemoptysis





- Gastrointestinal


Gastrointestinal: absent: Abdominal Pain, Constipation, Diarrhea, Nausea, 

Vomiting





- Integumentary


Integumentary: Dry Skin, Rash





- Neurological


Neurological: absent: Abnormal Gait





- Psychiatric


Psychiatric: absent: Anxiety, Depression





Past Patient History





- Infectious Disease


Hx of Infectious Diseases: None





- Tetanus Immunizations


Tetanus Immunization: Unknown





- Past Social History


Smoking Status: Never Smoked





- CARDIAC


Hx Cardiac Disorders: Yes


Hx Hypertension: Yes


Hx Pacemaker: No





- PULMONARY


Hx Respiratory Disorders: Yes


Hx Asthma: Yes





- NEUROLOGICAL


Hx Neurological Disorder: No





- HEENT


Hx HEENT Problems: No





- RENAL


Hx Chronic Kidney Disease: No





- ENDOCRINE/METABOLIC


Hx Endocrine Disorders: No





- HEMATOLOGICAL/ONCOLOGICAL


Hx Cancer: No





- INTEGUMENTARY


Hx Dermatological Problems: Yes


Other/Comment: bruising multiple left arm





- MUSCULOSKELETAL/RHEUMATOLOGICAL


Hx Falls: No





- GASTROINTESTINAL


Hx Gastrointestinal Disorders: Yes (obese)


Hx Gastroesophageal Reflux: Yes





- GENITOURINARY/GYNECOLOGICAL


Hx Genitourinary Disorders: No





- PSYCHIATRIC


Hx Psychophysiologic Disorder: Yes


Hx Anxiety: Yes


Hx Depression: Yes (loss of  2018)


Hx Emotional Abuse: No


Hx Physical Abuse: No


Hx Substance Use: No


Other/Comment: previous suicide attempt 2018 pills;





- SURGICAL HISTORY


Hx Mastectomy: No





- ANESTHESIA


Hx Anesthesia: No


Hx Anesthesia Reactions: No





Meds


Allergies/Adverse Reactions: 


 Allergies











Allergy/AdvReac Type Severity Reaction Status Date / Time


 


No Known Allergies Allergy   Verified 18 11:56














Physical Exam





- Constitutional


Appears: Well, Non-toxic, No Acute Distress





- Head Exam


Head Exam: ATRAUMATIC, NORMAL INSPECTION, NORMOCEPHALIC





- Eye Exam


Eye Exam: EOMI, PERRL





- ENT Exam


ENT Exam: Mucous Membranes Moist





- Respiratory Exam


Respiratory Exam: Clear to Auscultation Bilateral, NORMAL BREATHING PATTERN





- Cardiovascular Exam


Cardiovascular Exam: REGULAR RHYTHM, RRR





- GI/Abdominal Exam


GI & Abdominal Exam: Normal Bowel Sounds, Soft





- Extremities Exam


Extremities exam: Positive for: full ROM, normal inspection, tenderness (left 

upper extremity)





- Back Exam


Back exam: absent: CVA tenderness (L), CVA tenderness (R)





- Neurological Exam


Neurological exam: Alert, CN II-XII Intact, Oriented x3





- Skin


Skin Exam: Dry, Rash (around site of PICC line. Hard nodule was felt at the 

site of PICC line.)





Results





- Vital Signs


Recent Vital Signs: 





 Last Vital Signs











Temp  99.5 F   18 23:20


 


Pulse  72   18 02:53


 


Resp  18   18 02:53


 


BP  108/54 L  18 02:53


 


Pulse Ox  98   18 02:53














- Labs


Result Diagrams: 


 18 00:35





 18 00:35





Assessment & Plan





- Assessment and Plan (Free Text)


Assessment: 


47 year old female with past medical history of hypertension, gram positive 

coccemia, asthma, obesity, osteoarthritis, chronic back pain, and insomnia 

presents with malfunctioning PICC. Patient will be admitted for ruling out 

thrombus and for replacement of PICC line for MSSA.





Plan: 


MSSA Bacteremia


-On last discharge, patient was supposed to take cefazolin 1 gm Q8 for 6 weeks 

post discharge on 18. She is currently on week 4 of therapy


-Blood culture ordered, catheter tip culture ordered


-Dr. Osuna, ID, consulted for recommendations regarding whether to switch 

patient to xyvox or to keep patient on cefazolin with PICC line.


-Continue cefazolin





Rule out Left upper extremity DVT


-Nodule appreciated at site of PICC line insertion


-Duplex ultrasound of left upper extremity ordered to rule out DVT


-Follow up results





Hypertension


-BP: 108/54


-Continue home dose of atenolol, losartan, hydrochlorothiazide


-Continue to monitor.





Normocytic Anemia 2/2 likely to anemia of chronic disease


-Hgb: 9.3


-Iron, TIBC, Ferritin were normal on 18


-Continue to monitor.





Asthma


-O2 saturation: 98% on room air


-Medrol dose pack, singulair dialy


-Advair as needed





Anxiety


-Continue home ativan.





Depression


-Continue home amitriptyline





Chronic pain syndrome


-Continue tramadol/acetaminophen Q6


-Patient counseled regarding use of opiods.





GI prophylaxis: protonix 40 mg daily


DVT prophylaxis: lovenox 40 mg daily





Patient plan discussed with Dr. Galvez.








- Date & Time


Date: 18


Time: 03:54





<Beni Galvez - Last Filed: 18 06:21>





Results





- Vital Signs


Recent Vital Signs: 





 Last Vital Signs











Temp  98 F   18 03:10


 


Pulse  70   18 03:10


 


Resp  20   18 03:10


 


BP  120/74   18 03:10


 


Pulse Ox  98   18 02:53














- Labs


Result Diagrams: 


 18 00:35





 18 00:35





Attending/Attestation





- Attestation


I have personally seen and examined this patient.: Yes


I have fully participated in the care of the patient.: Yes


I have reviewed all pertinent clinical information: Yes


Notes (Text): 


 Need to R/O DVT in LUE. Alternatively ID consult sought to see if pt. can be 

transitioned to PO Zyvox.


18 06:20

## 2018-08-24 NOTE — US
HISTORY:

Arm pain and swelling. Evaluate for deep venous thrombosis.



PHYSICIAN(S):  Jermaine Somers MD.



FINDINGS:

There is is occlusive thrombus noted in the left basilic vein above 

the elbow. 



The left axillary vein, left subclavian vein, and left innominate 

vein are patent and compressible. 



There is no sonographic evidence deep venous thrombosis in the 

visualized segments of the right upper extremity 



IMPRESSION:

1. Superficial thrombophlebitis in the left basilic vein above the 

elbow

## 2018-08-24 NOTE — US
PROCEDURE:  Upper extremity OMAR exam



HISTORY:

Peripheral vascular disease with pain.



PHYSICIAN(S):  Jermaine Somers MD.



FINDINGS:

The resting WBI's are normal: right, 1.10and left, 1.18.



The upper arm, forearm, and wrist PVR waveforms are normal and 

symmetric. 



The segmental pressures are normal at all levels. 



IMPRESSION:

1. Normal WBI and PVR examination at rest.

## 2018-08-24 NOTE — ED PDOC
Arrival/HPI





- General


Chief Complaint: Medical Clearance


Time Seen by Provider: 08/23/18 23:46


Historian: Patient





- History of Present Illness


Narrative History of Present Illness (Text): 





08/24/18 00:41


A 47 year old female, whose past medical history includes asthma, anxiety, 

bipolar disorder, hypertension, and bacteremia, presents to the emergency 

department with a complaint of left PICC line malfunction. She notes that the 

left PICC line site was draining from the site itself upon administration of 

antibiotics at around 4 PM today She states that she called the nurse to come 

in and see it. The nurse noted the PICC line was not working properly and sent 

her into the emergency department. The patient states that she noted redness to 

the PICC line site. The patient denies fevers, chills, night sweats, headache, 

dizziness, sore throat, cough, chest pain, shortness of breath, dyspnea on 

exertion, abdominal pain, nausea, vomiting, diarrhea, neck/back pain, urinary/

bowel changes or any other complaint. 








PMD: Dr. Van








08/24/18 04:09





Time/Duration: Other (Yesterday)


Symptom Onset: Sudden


Symptom Course: Unchanged


Activities at Onset: Rest, Light


Context: Home





Past Medical History





- Provider Review


Nursing Documentation Reviewed: Yes





- Infectious Disease


Hx of Infectious Diseases: None





- Tetanus Immunization


Tetanus Immunization: Unknown





- Past Medical History


Past Medical History: No Previous





- Cardiac


Hx Cardiac Disorders: Yes


Hx Hypertension: Yes


Hx Pacemaker: No





- Pulmonary


Hx Respiratory Disorders: Yes


Hx Asthma: Yes





- Neurological


Hx Neurological Disorder: No





- HEENT


Hx HEENT Disorder: No





- Renal


Hx Renal Disorder: No





- Endocrine/Metabolic


Hx Endocrine Disorders: No





- Hematological/Oncological


Hx Cancer: No





- Integumentary


Hx Dermatological Disorder: Yes


Other/Comment: bruising multiple left arm





- Musculoskeletal/Rheumatological


Hx Falls: No





- Gastrointestinal


Hx Gastrointestinal Disorders: Yes (obese)


Hx Gastroesophageal Reflux: Yes





- Genitourinary/Gynecological


Hx Genitourinary Disorders: No





- Psychiatric


Hx Psychophysiologic Disorder: Yes


Hx Anxiety: Yes


Hx Depression: Yes (loss of  2018)


Hx Emotional Abuse: No


Hx Physical Abuse: No


Hx Substance Use: No


Other/Comment: previous suicide attempt 2018 pills;





- Past Surgical History


Past Surgical History: No Previous





- Surgical History


Hx Mastectomy: No





- Anesthesia


Hx Anesthesia: No


Hx Anesthesia Reactions: No





- Suicidal Assessment


Feels Threatened In Home Enviroment: No





Family/Social History





- Physician Review


Nursing Documentation Reviewed: Yes


Family/Social History: No Known Family HX


Smoking Status: Never Smoked


Hx Alcohol Use: No


Hx Substance Use: No


Hx Substance Use Treatment: No





Allergies/Home Meds


Allergies/Adverse Reactions: 


Allergies





No Known Allergies Allergy (Verified 07/27/18 11:56)


 








Home Medications: 


 Home Meds











 Medication  Instructions  Recorded  Confirmed


 


Cefazolin Sodium in 0.9 % NaCl 2 gm IV Q8 08/23/18 08/23/18





[Cefazolin 2 G/100 ml-0.9% NaCl]   














Review of Systems





- Physician Review


All systems were reviewed & negative as marked: Yes





- Review of Systems


Constitutional: absent: Fevers, Night Sweats


Respiratory: absent: SOB, Cough


Cardiovascular: absent: Chest Pain, VALDES


Gastrointestinal: absent: Abdominal Pain, Stool Changes, Diarrhea, Nausea, 

Vomiting


Genitourinary Female: absent: Urine Output Changes


Musculoskeletal: absent: Back Pain, Neck Pain


Neurological: absent: Headache, Dizziness





Physical Exam


Vital Signs Reviewed: Yes


Vital Signs











  Temp Pulse Resp BP Pulse Ox


 


 08/24/18 02:53   72  18  108/54 L  98


 


 08/23/18 23:20  99.5 F  90  18  119/70  98











Temperature: Afebrile


Blood Pressure: Normal


Pulse: Regular


Respiratory Rate: Normal


Appearance: Positive for: Well-Appearing, Non-Toxic, Comfortable


Pain Distress: None


Mental Status: Positive for: Alert and Oriented X 3





- Systems Exam


Head: Present: Atraumatic, Normocephalic


Pupils: Present: PERRL


Extroacular Muscles: Present: EOMI


Conjunctiva: Present: Normal


Mouth: Present: Moist Mucous Membranes


Neck: Present: Normal Range of Motion


Respiratory/Chest: Present: Clear to Auscultation, Good Air Exchange.  No: 

Respiratory Distress, Accessory Muscle Use


Cardiovascular: Present: Regular Rate and Rhythm, Normal S1, S2.  No: Murmurs


Abdomen: No: Tenderness, Distention, Peritoneal Signs


Back: Present: Normal Inspection


Upper Extremity: Present: Tenderness (Mildly tender left bicep area PICC line 

site. No streaking. No crepitus.), Erythema (Errthema to left bicep area PICC 

line site. ).  No: Cyanosis, Edema


Lower Extremity: Present: Normal Inspection.  No: Edema


Neurological: Present: GCS=15, CN II-XII Intact, Speech Normal


Skin: Present: Warm, Dry, Normal Color.  No: Rashes


Psychiatric: Present: Alert, Oriented x 3, Normal Insight, Normal Concentration





Medical Decision Making


ED Course and Treatment: 





08/24/18 00:48





Impression:


A 47 year old female presents to the emergency department with PICC line 

malfunction, questionable PICC line site infection. Will pull PICC line and 

give antibiotics given at home. 





Plan:


-- Labs


-- Blood/ Urine Culture


-- Vancomycin and Ancef 


-- Reassess and disposition





Prior Visits:


Notes and results from previous visits were reviewed.





Progress Notes:





08/24/18 00:09: Consulted Dr. Van who agrees with plan and requests 

admission to hospitalist's service. 





08/24/18 00:45: Pulled PICC line. Good hemostasis. Cultured line.





08/24/18 01:23: appreciate consult w/ PMD- to hospitalist: to Case discussed 

with Dr. Aguiar who will come evaluate patient.





08/24/18 01:47: Case discussed in detail with medical resident and Dr. Aguiar 

who accept patient to the hospitalist's service.





08/24/18 04:10








- Lab Interpretations


Lab Results: 








 08/24/18 00:35 





 08/24/18 00:35 





 Lab Results





08/24/18 00:35: Sodium 144, Potassium 3.8, Chloride 102, Carbon Dioxide 27, 

Anion Gap 19, BUN 8, Creatinine 0.7, Est GFR (African Amer) > 60, Est GFR (Non-

Af Amer) > 60, Random Glucose 107, Calcium 9.0, Total Bilirubin 0.6, AST 34, 

ALT 25, Alkaline Phosphatase 124, Total Protein 7.2, Albumin 4.0, Globulin 3.1, 

Albumin/Globulin Ratio 1.3


08/24/18 00:35: WBC 9.7  D, RBC 3.24 L, Hgb 9.3 L, Hct 29.2 L, MCV 90.1, MCH 

28.7, MCHC 31.8, RDW 15.9 H, Plt Count 343, MPV 8.6, Gran % 64.2, Lymph % (Auto

) 25.1, Mono % (Auto) 7.5 H, Eos % (Auto) 2.8, Baso % (Auto) 0.4, Gran # 6.21, 

Lymph # (Auto) 2.4, Mono # (Auto) 0.7 H, Eos # (Auto) 0.3, Baso # (Auto) 0.04








I have reviewed the lab results: Yes





- Medication Orders


Current Medication Orders: 








Amitriptyline HCl (Elavil)  25 mg PO HS JEF


Atenolol (Tenormin)  25 mg PO BID JEF


Enoxaparin Sodium (Lovenox)  40 mg SC DAILY JEF


   PRN Reason: Protocol


Cefazolin Sodium/Dextrose (Ancef Iv 2 Gm Duplex)  2 gm in 50 mls @ 50 mls/hr 

IVPB Q8 JEF


Loratadine (Claritin)  10 mg PO DAILY JEF


Lorazepam (Ativan)  0.5 mg PO BID PRN; Protocol


   PRN Reason: Anxiety


Losartan Potassium (Cozaar)  100 mg PO DAILY JEF


Montelukast Sodium (Singulair)  10 mg PO DAILY JEF


Non-Formulary Medication (Methylprednisolone [Medrol Dose Pack (21 Tabs)])  4 

mg PO DAILY JEF


Pantoprazole Sodium (Protonix Inj)  40 mg IVP DAILY JEF


Fluticasone/Salmeterol (Advair Diskus 250/50)   puff IH PRN PRN


   PRN Reason: Shortness of Breath


Tramadol/Acetaminophen (Ultracet 37.5/325 Mg)  1 tab PO Q6 JEF


Triamterene/HCTZ (Dyazide 25 Mg-37.5 Mg)  1 cap PO DAILY JEF





Discontinued Medications





Vancomycin HCl (Vancomycin 1gm)  1 gm in 250 mls @ 167 mls/hr IVPB STAT STA


   PRN Reason: Protocol


   Stop: 08/24/18 02:15


   Last Admin: 08/24/18 01:42  Dose: 167 mls/hr





eMAR Start Stop


 Document     08/24/18 01:42  CNR  (Rec: 08/24/18 01:42  CNR  [id number])


     Intravenous Solution


      Start Date                                 08/24/18


      Start Time                                 01:42





Cefazolin Sodium/Dextrose (Ancef Iv 2 Gm Duplex)  2 gm in 50 mls @ 50 mls/hr 

IVPB STAT STA


   Stop: 08/24/18 01:49


   Last Admin: 08/24/18 01:06  Dose: 50 mls/hr





eMAR Start Stop


 Document     08/24/18 01:06  CNR  (Rec: 08/24/18 01:07  CNR  [id number])


     Intravenous Solution


      Start Date                                 08/24/18


      Start Time                                 01:06


      End Date                                   08/24/18


      End time                                   01:40


      Total Infusion Time                        34














- Scribe Statement


The provider has reviewed the documentation as recorded by the Fabian Martinez





Provider Annaliseibe Attestation:


All medical record entries made by the Scribe were at my direction and 

personally dictated by me. I have reviewed the chart and agree that the record 

accurately reflects my personal performance of the history, physical exam, 

medical decision making, and the department course for this patient. I have 

also personally directed, reviewed, and agree with the discharge instructions 

and disposition.








Disposition/Present on Arrival





- Present on Arrival


Any Indicators Present on Arrival: No


History of DVT/PE: No


History of Uncontrolled Diabetes: No


Urinary Catheter: No


History of Decub. Ulcer: No


History Surgical Site Infection Following: None





- Disposition


Have Diagnosis and Disposition been Completed?: Yes


Diagnosis: 


 PICC line infection, Occluded PICC line





Disposition: HOSPITALIZED


Disposition Time: 00:43


Condition: GOOD

## 2018-08-25 RX ADMIN — ENOXAPARIN SODIUM SCH MG: 100 INJECTION SUBCUTANEOUS at 06:00

## 2018-08-25 RX ADMIN — CEFAZOLIN SODIUM SCH MLS/HR: 2 SOLUTION INTRAVENOUS at 06:00

## 2018-08-25 RX ADMIN — TRIAMTERENE AND HYDROCHLOROTHIAZIDE SCH CAP: 25; 37.5 CAPSULE ORAL at 09:59

## 2018-08-25 RX ADMIN — PANTOPRAZOLE SODIUM SCH MG: 40 TABLET, DELAYED RELEASE ORAL at 09:59

## 2018-08-25 RX ADMIN — OXYCODONE HYDROCHLORIDE PRN MG: 30 TABLET ORAL at 10:28

## 2018-08-25 RX ADMIN — ENOXAPARIN SODIUM SCH MG: 100 INJECTION SUBCUTANEOUS at 17:09

## 2018-08-25 RX ADMIN — ARFORMOTEROL TARTRATE SCH MCG: 15 SOLUTION RESPIRATORY (INHALATION) at 19:28

## 2018-08-25 RX ADMIN — CEFAZOLIN SODIUM SCH MLS/HR: 2 SOLUTION INTRAVENOUS at 21:32

## 2018-08-25 RX ADMIN — BUDESONIDE SCH MG: 0.5 SUSPENSION RESPIRATORY (INHALATION) at 19:28

## 2018-08-25 RX ADMIN — OXYCODONE HYDROCHLORIDE PRN MG: 30 TABLET ORAL at 21:06

## 2018-08-25 RX ADMIN — BUDESONIDE SCH MG: 0.5 SUSPENSION RESPIRATORY (INHALATION) at 07:29

## 2018-08-25 RX ADMIN — CEFAZOLIN SODIUM SCH MLS/HR: 2 SOLUTION INTRAVENOUS at 13:33

## 2018-08-25 RX ADMIN — ARFORMOTEROL TARTRATE SCH MCG: 15 SOLUTION RESPIRATORY (INHALATION) at 07:29

## 2018-08-25 NOTE — CP.PCM.PN
<Rui Salomon - Last Filed: 08/25/18 16:08>





Subjective





- Date & Time of Evaluation


Date of Evaluation: 08/25/18


Time of Evaluation: 12:59





- Subjective


Subjective: 





Rui Salomon PGY1 Progress Note for Dr. Gomez 





Ms. Wiley was examined at bedside this morning. She had no acute complaints. 

She denied any headache, dizziness, shortness of breath, chest pain, nausea, 

vomiting, diarrhea. 








Objective





- Vital Signs/Intake and Output


Vital Signs (last 24 hours): 


 











Temp Pulse Resp BP Pulse Ox


 


 98.3 F   65   20   156/86 H  98 


 


 08/25/18 12:48  08/25/18 12:48  08/25/18 12:48  08/25/18 12:48  08/25/18 12:48








Intake and Output: 


 











 08/25/18 08/25/18





 06:59 18:59


 


Intake Total 360 


 


Balance 360 














- Medications


Medications: 


 Current Medications





Amitriptyline HCl (Elavil)  25 mg PO HS [facility]


   Last Admin: 08/24/18 22:02 Dose:  25 mg


Arformoterol Tartrate (Brovana)  15 mcg IH [id number] [facility]


   Last Admin: 08/25/18 07:29 Dose:  15 mcg


Atenolol (Tenormin)  25 mg PO BID [facility]


   Last Admin: 08/25/18 09:59 Dose:  25 mg


Budesonide (Pulmicort Respules)  0.5 mg IH [id number] [facility]


   Last Admin: 08/25/18 07:29 Dose:  0.5 mg


Docusate Sodium (Colace)  100 mg PO DAILY [facility]


   Last Admin: 08/25/18 09:59 Dose:  100 mg


Enoxaparin Sodium (Lovenox)  100 mg SC Q12H JEF


   PRN Reason: Protocol


   Last Admin: 08/25/18 06:00 Dose:  100 mg


Cefazolin Sodium/Dextrose (Ancef Iv 2 Gm Duplex)  2 gm in 50 mls @ 50 mls/hr 

IVPB Q8 [facility]


   Last Admin: 08/25/18 06:00 Dose:  50 mls/hr


Lorazepam (Ativan)  0.5 mg PO BID PRN; Protocol


   PRN Reason: Anxiety


   Last Admin: 08/25/18 01:30 Dose:  0.5 mg


Losartan Potassium (Cozaar)  100 mg PO DAILY [facility]


   Last Admin: 08/25/18 09:59 Dose:  100 mg


Montelukast Sodium (Singulair)  10 mg PO DAILY [facility]


   Last Admin: 08/25/18 10:00 Dose:  10 mg


Oxycodone HCl (Oxycodone Immediate Release Tab)  30 mg PO Q8H PRN


   PRN Reason: Pain, severe (8-10)


   Last Admin: 08/25/18 10:28 Dose:  30 mg


Pantoprazole Sodium (Protonix Ec Tab)  40 mg PO ACB [facility]


   Last Admin: 08/25/18 09:59 Dose:  40 mg


Triamterene/HCTZ (Dyazide 25 Mg-37.5 Mg)  1 cap PO DAILY [facility]


   Last Admin: 08/25/18 09:59 Dose:  1 cap











- Constitutional


Appears: Well, No Acute Distress





- Head Exam


Head Exam: ATRAUMATIC, NORMOCEPHALIC





- Eye Exam


Eye Exam: Normal appearance


Pupil Exam: NORMAL ACCOMODATION





- ENT Exam


ENT Exam: Mucous Membranes Moist





- Respiratory Exam


Respiratory Exam: Clear to Ausculation Bilateral, NORMAL BREATHING PATTERN.  

absent: Rales, Rhonchi, Wheezes





- Cardiovascular Exam


Cardiovascular Exam: REGULAR RHYTHM, +S1, +S2





- GI/Abdominal Exam


GI & Abdominal Exam: Soft, Normal Bowel Sounds.  absent: Distended, Tenderness





- Extremities Exam


Extremities Exam: Pedal Edema


Additional comments: 





LUE: areas of erythematous edematous papules on anterior arm, improved








- Neurological Exam


Neurological Exam: Alert, Awake, Oriented x3





- Psychiatric Exam


Psychiatric exam: Normal Affect, Normal Mood





Assessment and Plan





- Assessment and Plan (Free Text)


Assessment: 





47 year old female with past medical history of hypertension, gram positive 

coccemia, asthma, obesity, osteoarthritis, chronic back pain, and insomnia 

presents with malfunctioning PICC. Patient will be admitted for ruling out 

thrombus and for replacement of PICC line for MSSA.








Plan: 





MSSA Bacteremia


- On week 4 ancef 1g q8h as per previous d/c plan


- BCx: negative


- rpt BCx: pending


- catheter tip Cx: pending


- pt afebrile


- contiue ancef as per ID


- possible insertion of new PICC line


- ID consulted, Dr. Moss - beata appreciated





LUE Thrombus


- LUE US: occlusive thrombus in L vasilic vein above elbow, superficial 

thrombophlebitis


- continue lovenox 100


- Heme/Onc Consulted, Dr. Pisano - f/u recs





LE Edema


- pt reports b/l LE edema prior to admission


- compression stockings on, advised larger size


- LE doppler ordered: f/u results





HTN


-BP: 156/86


-Continue home dose of atenolol, losartan, hydrochlorothiazide


-Continue to monitor





Normocytic Anemia 2/2 likely to anemia of chronic disease


- Hb (8/25): 9.3


- Continue to monitor





Asthma


- O2 saturation: 98% on room air


- continue singulair daily


- Advair PRN





Anxiety


- Continue home ativan





Depression


- Continue home amitriptyline





Chronic pain syndrome


- start oxycodone 30 q8h, confirmed with pt pharmacy


- Patient counseled regarding use of opiods





PPx:


GI: protonix 40 mg daily





Patient plan discussed with Dr. Gomez.








<Kathy Gomez R - Last Filed: 08/25/18 20:45>





Objective





- Vital Signs/Intake and Output


Vital Signs (last 24 hours): 


 











Temp Pulse Resp BP Pulse Ox


 


 98.3 F   65   20   155/84 H  98 


 


 08/25/18 12:48  08/25/18 12:48  08/25/18 12:48  08/25/18 17:10  08/25/18 12:48








Intake and Output: 


 











 08/25/18 08/26/18





 18:59 06:59


 


Intake Total 720 


 


Balance 720 














- Medications


Medications: 


 Current Medications





Amitriptyline HCl (Elavil)  25 mg PO HS [facility]


   Last Admin: 08/24/18 22:02 Dose:  25 mg


Arformoterol Tartrate (Brovana)  15 mcg IH [id number] [facility]


   Last Admin: 08/25/18 19:28 Dose:  15 mcg


Atenolol (Tenormin)  25 mg PO BID [facility]


   Last Admin: 08/25/18 17:10 Dose:  25 mg


Budesonide (Pulmicort Respules)  0.5 mg IH [id number] [facility]


   Last Admin: 08/25/18 19:28 Dose:  0.5 mg


Docusate Sodium (Colace)  100 mg PO DAILY [facility]


   Last Admin: 08/25/18 09:59 Dose:  100 mg


Enoxaparin Sodium (Lovenox)  100 mg SC Q12H [facility]


   PRN Reason: Protocol


   Last Admin: 08/25/18 17:09 Dose:  100 mg


Cefazolin Sodium/Dextrose (Ancef Iv 2 Gm Duplex)  2 gm in 50 mls @ 50 mls/hr 

IVPB Q8 [facility]


   Last Admin: 08/25/18 13:33 Dose:  50 mls/hr


Lorazepam (Ativan)  0.5 mg PO BID PRN; Protocol


   PRN Reason: Anxiety


   Last Admin: 08/25/18 01:30 Dose:  0.5 mg


Losartan Potassium (Cozaar)  100 mg PO DAILY [facility]


   Last Admin: 08/25/18 09:59 Dose:  100 mg


Montelukast Sodium (Singulair)  10 mg PO DAILY [facility]


   Last Admin: 08/25/18 10:00 Dose:  10 mg


Oxycodone HCl (Oxycodone Immediate Release Tab)  30 mg PO Q8H PRN


   PRN Reason: Pain, severe (8-10)


   Last Admin: 08/25/18 10:28 Dose:  30 mg


Pantoprazole Sodium (Protonix Ec Tab)  40 mg PO ACB [facility]


   Last Admin: 08/25/18 09:59 Dose:  40 mg


Triamterene/HCTZ (Dyazide 25 Mg-37.5 Mg)  1 cap PO DAILY [facility]


   Last Admin: 08/25/18 09:59 Dose:  1 cap











Attending/Attestation





- Attestation


I have personally seen and examined this patient.: Yes


I have fully participated in the care of the patient.: Yes


I have reviewed all pertinent clinical information, including history, physical 

exam and plan: Yes


Notes (Text): 


Patient seen and examined by me at 10:20AM with resident. Case including HPI, 

physical exam, and assessment and plan discussed with resident. Agree with 

above with following additions/corrections.


Patient is a 47 year old female with past medical history significant for 

hypertension, asthma, obesity, osteoarthritis, chronic back pain, insomnia, and 

gram positive bacteremia that presented to the emergency room with 

malfunctioning PICC line.  


Patient states is feeling ok. Denies any pain in LUE. She denies any chest pain 

or shortness of breath. No headache or dizziness. No fevers or chills. No nausea

, vomiting, or abdominal pain. She does complain of some lower extremity 

swelling.


Physical exam:


Gen: Awake and alert sitting up in bed in no acute distress


HEENT: Normocephalic, atraumatic. Extraocular muscles intact, pupils equal 

reactive. No scleral icterus.  Oropharynx is pink and moist. Neck is supple. 


Cardiovascular: Normal rhythm.  Normal S1, S2. No murmurs, rubs, or gallops 

appreciated


Pulmonary: Normal respiratory effort. No rhonchi, rales, or wheezing 

appreciated. 


Gastrointestinal: Soft, nontender, nondistended. Positive bowel sounds all 4 

quadrants, no guarding. 


Musculoskeletal: Moves all extremities.  No calf tenderness. Positive hard area 

felt at sight of previous PICC line. Positive bilateral lower extremity edema. 


Central nervous system:  AAO x 3. 


Dermatologic:  Skin warm and dry


Assessment and plan: Patient is a 47 year old female with past medical history 

significant for hypertension, asthma, obesity, osteoarthritis, chronic back pain

, insomnia, and gram positive bacteremia that presented to the emergency room 

with malfunctioning PICC line


1. Malfunctioning PICC line. PICC line removed. ID following, recommendations 

appreciated. Follow up for recommended outpatient antibiotics


2. MSSA Bacteremia. One blood culture positive for staph aureus 7/27/18. 

Patient is on week for on Ancef. ID following, recommendations appreciated


3. LUE thrombus. LUE ultrasound positive for occlusive thrombus of left basilic 

vein above the left elbow, superficial thrombophlebitis. Continue therapeutic 

lovenox. Hem/onc consulted, follow up recommendations. 


4. Bilateral lower extremity edema. Follow up lower extremity dopplers to rule 

out DVT


5. Hypertension. Continue atenolo, losartan, Triamterene/HCTZ


6. Anemia. Appears to be chronic. No signs of bleeding. Asymptomatic. Continue 

to monitor.


7. Asthma. Not in acute exacerbation. Continue Brovana, Singulair, and pulmicort


8. Depression and anxiety. Continue  Elavil and ativan prn.


9. Chronic pain syndrome. Continue home oxycodone 


Case was discussed in detail with the patient regarding current diagnosis and 

treatment plan.

## 2018-08-25 NOTE — PN
Copied To:  Zeeshan Mcleod MD

Attending MD:  Zeeshan Mcleod MD



DATE:  08/25/2018



SUBJECTIVE:   The patient is in bed, in no acute distress, nontoxic.



PHYSICAL EXAMINATION:

VITAL SIGNS:  Temperature is 98, blood pressure is 150/70, respiratory rate

of 16.

HEENT:  Unremarkable.

NECK:  Supple.

LUNGS:  Have decreased breath sounds.

HEART:  Normal S1, S2.

ABDOMEN:  Soft, nontender.



LABORATORY DATA:  Laboratory examination reveals a white count of 9.7 and

hemoglobin of 9.  Chemistries are noted.  Microbiology reveals the blood

cultures are no growth at 24 hours.  Review of orders reveals the patient

to be on cefazolin.



ASSESSMENT AND PLAN:  A  47-year-old female seen earlier today in 572, bed

1, admitted with sensitive Staphylococcus aureus bacteremia with

superficial thrombophlebitis of left basilic vein and left arm and chronic

asthma, obesity with a body mass index of 38, on cefazolin day #28, would

complete 4-6 weeks of antibiotics in a patient with anxiety.  We will

follow with you and laboratories.  Ultrasound of the extremity is pending. 

Repeat cultures from yesterday are no growth.





__________________________________________

Zeeshan Mcleod MD









DD:  08/25/2018 11:30:29

DT:  08/25/2018 11:32:00

Job # [id number]

## 2018-08-26 LAB
ALBUMIN SERPL-MCNC: 3.6 G/DL (ref 3–4.8)
ALBUMIN/GLOB SERPL: 1.2 {RATIO} (ref 1.1–1.8)
ALT SERPL-CCNC: 18 U/L (ref 7–56)
AST SERPL-CCNC: 23 U/L (ref 14–36)
BASOPHILS # BLD AUTO: 0.04 K/MM3 (ref 0–2)
BASOPHILS NFR BLD: 0.4 % (ref 0–3)
BUN SERPL-MCNC: 8 MG/DL (ref 7–21)
CALCIUM SERPL-MCNC: 9.1 MG/DL (ref 8.4–10.5)
EOSINOPHIL # BLD: 0.1 10*3/UL (ref 0–0.7)
EOSINOPHIL NFR BLD: 1.3 % (ref 1.5–5)
ERYTHROCYTE [DISTWIDTH] IN BLOOD BY AUTOMATED COUNT: 15.5 % (ref 11.5–14.5)
GFR NON-AFRICAN AMERICAN: > 60
GRANULOCYTES # BLD: 6.1 10*3/UL (ref 1.4–6.5)
GRANULOCYTES NFR BLD: 67.9 % (ref 50–68)
HGB BLD-MCNC: 9.1 G/DL (ref 12–16)
LYMPHOCYTES # BLD: 2.2 10*3/UL (ref 1.2–3.4)
LYMPHOCYTES NFR BLD AUTO: 24.6 % (ref 22–35)
MCH RBC QN AUTO: 28.1 PG (ref 25–35)
MCHC RBC AUTO-ENTMCNC: 32 G/DL (ref 31–37)
MCV RBC AUTO: 87.7 FL (ref 80–105)
MONOCYTES # BLD AUTO: 0.5 10*3/UL (ref 0.1–0.6)
MONOCYTES NFR BLD: 5.8 % (ref 1–6)
PLATELET # BLD: 392 10^3/UL (ref 120–450)
PMV BLD AUTO: 8.8 FL (ref 7–11)
RBC # BLD AUTO: 3.24 10^6/UL (ref 3.5–6.1)
WBC # BLD AUTO: 9 10^3/UL (ref 4.5–11)

## 2018-08-26 RX ADMIN — TRIAMTERENE AND HYDROCHLOROTHIAZIDE SCH CAP: 25; 37.5 CAPSULE ORAL at 09:51

## 2018-08-26 RX ADMIN — ARFORMOTEROL TARTRATE SCH MCG: 15 SOLUTION RESPIRATORY (INHALATION) at 12:10

## 2018-08-26 RX ADMIN — ENOXAPARIN SODIUM SCH MG: 100 INJECTION SUBCUTANEOUS at 06:08

## 2018-08-26 RX ADMIN — ENOXAPARIN SODIUM SCH MG: 100 INJECTION SUBCUTANEOUS at 17:03

## 2018-08-26 RX ADMIN — BUDESONIDE SCH MG: 0.5 SUSPENSION RESPIRATORY (INHALATION) at 12:10

## 2018-08-26 RX ADMIN — OXYCODONE HYDROCHLORIDE PRN MG: 30 TABLET ORAL at 05:04

## 2018-08-26 RX ADMIN — CEFAZOLIN SODIUM SCH MLS/HR: 2 SOLUTION INTRAVENOUS at 13:17

## 2018-08-26 RX ADMIN — CEFAZOLIN SODIUM SCH MLS/HR: 2 SOLUTION INTRAVENOUS at 06:07

## 2018-08-26 RX ADMIN — CEFAZOLIN SODIUM SCH MLS/HR: 2 SOLUTION INTRAVENOUS at 21:09

## 2018-08-26 RX ADMIN — BUDESONIDE SCH MG: 0.5 SUSPENSION RESPIRATORY (INHALATION) at 19:24

## 2018-08-26 RX ADMIN — PANTOPRAZOLE SODIUM SCH MG: 40 TABLET, DELAYED RELEASE ORAL at 07:52

## 2018-08-26 RX ADMIN — OXYCODONE HYDROCHLORIDE PRN MG: 30 TABLET ORAL at 13:08

## 2018-08-26 RX ADMIN — OXYCODONE HYDROCHLORIDE PRN MG: 30 TABLET ORAL at 21:14

## 2018-08-26 RX ADMIN — ARFORMOTEROL TARTRATE SCH MCG: 15 SOLUTION RESPIRATORY (INHALATION) at 19:24

## 2018-08-26 NOTE — PN
Copied To:  Zeeshan Mcleod MD

Attending MD:  Zeeshan Mcleod MD



DATE:  08/26/2018



SUBJECTIVE:  The patient is seen in 572, bed 1.  The patient is awake and

alert.  She is eager about going home today.



PHYSICAL EXAMINATION:

VITAL SIGNS:  On exam, temperature is 98, blood pressure is 140/80,

respiratory rate of 18.

HEENT:  Examination of HEENT is unremarkable.

NECK:  Supple.

LUNGS:  Have decreased breath sounds.

HEART:  Normal S1, S2.

ABDOMEN:  Soft, nontender.



LABORATORY DATA:  Laboratory examination reveals a white count of 9,

hemoglobin of 9, platelets of 392.  Chemistries reveals a BUN of 8,

creatinine of 0.8.  Cultures reveal the patient's blood cultures have no

growth.  Catheter tip cultures, greater than 15,000 colonies.



ASSESSMENT AND PLAN:  A 47-year-old female, was seen today, who was

admitted with a sensitive Staph aureus bacteremia with superficial

thrombophlebitis of the left basilic vein and left arm and chronic asthma,

obesity, body mass index of 38, on cefazolin, day #29 and history of

anxiety.  Repeat cultures negative.  The catheter tip culture has greater

than 15,000 colonies and no further information.  The blood cultures are

negative at this time.  Dr. Gomez's progress note from yesterday is

reviewed.  The patient with malfunctioning peripherally inserted central

catheter line, which was removed and admitted with a superficial

thrombophlebitis in the left basilic vein above the elbow, on cefazolin. 

We will check on the sedimentation rate and a C-reactive protein.  Repeat

cultures are negative.  Review of microbiology reveals the patient did have

one bottle of Staphylococcus aureus from 07/27/2018 and which was

pansensitive and repeat blood cultures from 07/29/2018 were negative.





__________________________________________

Zeeshan Mcleod MD





DD:  08/26/2018 11:34:09

DT:  08/26/2018 11:37:23

Job # [id number]

## 2018-08-27 VITALS — OXYGEN SATURATION: 100 %

## 2018-08-27 VITALS
HEART RATE: 70 BPM | SYSTOLIC BLOOD PRESSURE: 151 MMHG | TEMPERATURE: 98.5 F | DIASTOLIC BLOOD PRESSURE: 77 MMHG | RESPIRATION RATE: 98 BRPM

## 2018-08-27 LAB
ALBUMIN SERPL-MCNC: 3.9 G/DL (ref 3–4.8)
ALBUMIN/GLOB SERPL: 1.2 {RATIO} (ref 1.1–1.8)
ALT SERPL-CCNC: 18 U/L (ref 7–56)
AST SERPL-CCNC: 51 U/L (ref 14–36)
BASOPHILS # BLD AUTO: 0.05 K/MM3 (ref 0–2)
BASOPHILS NFR BLD: 0.5 % (ref 0–3)
BUN SERPL-MCNC: 9 MG/DL (ref 7–21)
CALCIUM SERPL-MCNC: 9.7 MG/DL (ref 8.4–10.5)
EOSINOPHIL # BLD: 0.1 10*3/UL (ref 0–0.7)
EOSINOPHIL NFR BLD: 1.3 % (ref 1.5–5)
ERYTHROCYTE [DISTWIDTH] IN BLOOD BY AUTOMATED COUNT: 15.8 % (ref 11.5–14.5)
GFR NON-AFRICAN AMERICAN: > 60
GRANULOCYTES # BLD: 7.42 10*3/UL (ref 1.4–6.5)
GRANULOCYTES NFR BLD: 68.6 % (ref 50–68)
HGB BLD-MCNC: 9.5 G/DL (ref 12–16)
LYMPHOCYTES # BLD: 2.6 10*3/UL (ref 1.2–3.4)
LYMPHOCYTES NFR BLD AUTO: 23.8 % (ref 22–35)
MCH RBC QN AUTO: 27.8 PG (ref 25–35)
MCHC RBC AUTO-ENTMCNC: 31.5 G/DL (ref 31–37)
MCV RBC AUTO: 88.3 FL (ref 80–105)
MONOCYTES # BLD AUTO: 0.6 10*3/UL (ref 0.1–0.6)
MONOCYTES NFR BLD: 5.8 % (ref 1–6)
PLATELET # BLD: 454 10^3/UL (ref 120–450)
PMV BLD AUTO: 8.7 FL (ref 7–11)
RBC # BLD AUTO: 3.42 10^6/UL (ref 3.5–6.1)
WBC # BLD AUTO: 10.8 10^3/UL (ref 4.5–11)

## 2018-08-27 RX ADMIN — CEFAZOLIN SODIUM SCH MLS/HR: 2 SOLUTION INTRAVENOUS at 05:08

## 2018-08-27 RX ADMIN — CEFAZOLIN SODIUM SCH MLS/HR: 2 SOLUTION INTRAVENOUS at 13:45

## 2018-08-27 RX ADMIN — TRIAMTERENE AND HYDROCHLOROTHIAZIDE SCH CAP: 25; 37.5 CAPSULE ORAL at 10:06

## 2018-08-27 RX ADMIN — ENOXAPARIN SODIUM SCH MG: 100 INJECTION SUBCUTANEOUS at 05:07

## 2018-08-27 RX ADMIN — ARFORMOTEROL TARTRATE SCH MCG: 15 SOLUTION RESPIRATORY (INHALATION) at 07:18

## 2018-08-27 RX ADMIN — PANTOPRAZOLE SODIUM SCH MG: 40 TABLET, DELAYED RELEASE ORAL at 10:07

## 2018-08-27 RX ADMIN — OXYCODONE HYDROCHLORIDE PRN MG: 30 TABLET ORAL at 05:07

## 2018-08-27 RX ADMIN — BUDESONIDE SCH MG: 0.5 SUSPENSION RESPIRATORY (INHALATION) at 07:18

## 2018-08-27 RX ADMIN — OXYCODONE HYDROCHLORIDE PRN MG: 30 TABLET ORAL at 13:03

## 2018-08-27 NOTE — PN
Copied To:  Zeeshan Mcleod MD

Attending MD: Zeeshan Mcleod MD



DATE:  08/27/2018



SUBJECTIVE:  The patient is in bed, in no acute distress, was seen earlier

today in 572, bed 1.  She is doing well.  No nausea, no vomiting.  No

fevers and chills.



PHYSICAL EXAMINATION:

VITAL SIGNS:  On exam, temperature is 98, blood pressure is 150/70,

respiratory rate of 18.

HEENT:  Examination of HEENT is unremarkable.

NECK:  Supple.

LUNGS:  Have decreased breath sounds.

HEART:  Normal S1, S2.

ABDOMEN:  Soft, nontender.



LABORATORY DATA:  Laboratory examination is noted and reviewed.  Sed rate

is 61 which I had ordered yesterday.  C-reactive protein is 32 which is

also high and chemistries are reviewed.  Microbiology reveals Acinetobacter

baumannii on the catheter tip.  The blood cultures are negative and it is

sensitive to Cipro.



ASSESSMENT AND PLAN:  A 47-year-old female with morbid obesity with a

history of sensitive Staphylococcus aureus bacteremia, superficial

thrombophlebitis in the left basilic vein, left arm; and chronic asthma;

obesity; body mass index of 38 with 30 days of cefazolin.  To continue with

p.o. antibiotics as discussed with PMD and follow the sed rate and

C-reactive protein to resolution and the patient states that she will

follow as outpatient.





__________________________________________

Zeeshan Mcleod MD







DD:  08/27/2018 18:52:12

DT:  08/27/2018 18:53:54

Job # [id number]

## 2018-08-27 NOTE — US
HISTORY:

Leg pain and swelling. Evaluate for DVT



PHYSICIAN(S):  Jermaine Somers MD.



TECHNIQUE:

Duplex sonography and color-flow Doppler with graded compression were 

used to evaluate the deep venous systems of both lower extremities. 

The exam is somewhat limited by edema



FINDINGS:

The visualized deep venous systems of both lower extremities are 

sonographically normal and compressible. Normal wave forms and 

augmentation are seen. There is no sonographic evidence for deep 

venous thrombosis in the visualized segments of both lower 

extremities.



IMPRESSION:

No sonographic evidence for deep venous thrombosis in the visualized 

segments of both lower extremities.

## 2018-08-27 NOTE — CP.PCM.DIS
Provider





- Provider


Date of Admission: 


08/24/18 01:55





Attending physician: 


Ho Bear MD





Primary care physician: 


Aries Van MD





Consults: 





ID- Enrrique


Heme/Onc - Aliya


Time Spent in preparation of Discharge (in minutes): 100





Hospital Course





- Lab Results


Lab Results: 


 Most Recent Lab Values











WBC  10.8 10^3/ul (4.5-11.0)   08/27/18  06:30    


 


RBC  3.42 10^6/uL (3.5-6.1)  L  08/27/18  06:30    


 


Hgb  9.5 g/dL (12.0-16.0)  L  08/27/18  06:30    


 


Hct  30.2 % (36.0-48.0)  L  08/27/18  06:30    


 


MCV  88.3 fl (80.0-105.0)   08/27/18  06:30    


 


MCH  27.8 pg (25.0-35.0)   08/27/18  06:30    


 


MCHC  31.5 g/dl (31.0-37.0)   08/27/18  06:30    


 


RDW  15.8 % (11.5-14.5)  H  08/27/18  06:30    


 


Plt Count  454 10^3/uL (120.0-450.0)  H  08/27/18  06:30    


 


MPV  8.7 fl (7.0-11.0)   08/27/18  06:30    


 


Gran %  68.6 % (50.0-68.0)  H  08/27/18  06:30    


 


Lymph % (Auto)  23.8 % (22.0-35.0)   08/27/18  06:30    


 


Mono % (Auto)  5.8 % (1.0-6.0)   08/27/18  06:30    


 


Eos % (Auto)  1.3 % (1.5-5.0)  L  08/27/18  06:30    


 


Baso % (Auto)  0.5 % (0.0-3.0)   08/27/18  06:30    


 


Gran #  7.42  (1.4-6.5)  H  08/27/18  06:30    


 


Lymph # (Auto)  2.6  (1.2-3.4)   08/27/18  06:30    


 


Mono # (Auto)  0.6  (0.1-0.6)   08/27/18  06:30    


 


Eos # (Auto)  0.1  (0.0-0.7)   08/27/18  06:30    


 


Baso # (Auto)  0.05 K/mm3 (0.0-2.0)   08/27/18  06:30    


 


ESR  61 mm/hr (0.0-20.0)  H  08/26/18  07:00    


 


Sodium  145 mmol/L (132-148)   08/27/18  06:30    


 


Potassium  4.6 mmol/L (3.6-5.0)   08/27/18  06:30    


 


Chloride  101 mmol/L ()   08/27/18  06:30    


 


Carbon Dioxide  32 mmol/L (21-33)   08/27/18  06:30    


 


Anion Gap  17  (10-20)   08/27/18  06:30    


 


BUN  9 mg/dL (7-21)   08/27/18  06:30    


 


Creatinine  0.9 mg/dl (0.7-1.2)   08/27/18  06:30    


 


Est GFR ( Amer)  > 60   08/27/18  06:30    


 


Est GFR (Non-Af Amer)  > 60   08/27/18  06:30    


 


Random Glucose  103 mg/dL ()   08/27/18  06:30    


 


Calcium  9.7 mg/dL (8.4-10.5)   08/27/18  06:30    


 


Total Bilirubin  0.4 mg/dL (0.2-1.3)   08/27/18  06:30    


 


AST  51 U/L (14-36)  H D 08/27/18  06:30    


 


ALT  18 U/L (7-56)   08/27/18  06:30    


 


Alkaline Phosphatase  99 U/L ()   08/27/18  06:30    


 


C-Reactive Protein  32.30 mg/L (0.0-9.9)  H  08/26/18  07:00    


 


Total Protein  7.1 g/dL (5.8-8.3)   08/27/18  06:30    


 


Albumin  3.9 g/dL (3.0-4.8)   08/27/18  06:30    


 


Globulin  3.2 gm/dL  08/27/18  06:30    


 


Albumin/Globulin Ratio  1.2  (1.1-1.8)   08/27/18  06:30    














- Hospital Course


Hospital Course: 





Upon Admission: 





47- year old female that came into the ER admitted for left PICC line 

malfunction. The patient noticed that the PICC line site was draining upon 

administration of antibiotic. Patient called nurse to come look at the PICC line

, and the nurse confirmed that the PICC line was not functioning properly and 

sent the patient to the ER. The patient admits to redness around the PICC line 

insertion site.





Hospital Course: 





Patient underwent Doppler of Bilateral Upper extremity veins and was found to 

have a superficial thrombophlebitis in the left basilica vein above the elbow. 

ID was consulted and patient was found to have sensitive Staphylococcus 

bacteremia and started on cefazolin for 4-6 weeks. On 8/25/18 Patient was 

continued on home dose of atenolol, losartan and hydrochlorothiazide, was given 

Advair PRN for 98% O2 saturation on room air, was continued on home dosages of 

amitriptyline and Ativan. Patient was also started on oxycodone 30 q8h and 

protonix 40 mg daily per home medications. Repeat blood cultures on 8/26 were 

found to be negative. Catheter tip cultures found positive for acinetobacter 

baumanii. ID recommended levaquin daily for the next two weeks. Patient was 

given ambien 5 qhr in order to sleep. She was ambulating and tolerating diet. 





Upon discharge:





8/27 D/C with eliquis 5mg BID x 3 months, Levaquin, and Lactobacillus. 


She is to follow up with PMD, ID, and Heme/Onc within 1-2 weeks.  





- Date & Time of H&P


Date of H&P: 08/24/18


Time of H&P: 03:40





Discharge Exam





- Head Exam


Head Exam: ATRAUMATIC, NORMAL INSPECTION, NORMOCEPHALIC





- Eye Exam


Eye Exam: EOMI


Pupil Exam: PERRL





- ENT Exam


ENT Exam: Mucous Membranes Moist, Normal Exam





- Respiratory Exam


Respiratory Exam: NORMAL BREATHING PATTERN, UNREMARKABLE.  absent: Wheezes





- Cardiovascular Exam


Cardiovascular Exam: REGULAR RHYTHM, +S1, +S2.  absent: Systolic Murmur





- GI/Abdominal Exam


GI & Abdominal Exam: Normal Bowel Sounds, Soft.  absent: Tenderness





- Extremities Exam


Extremities exam: full ROM, pedal pulses present





- Neurological Exam


Neurological exam: Alert, Oriented x3





- Psychiatric Exam


Psychiatric exam: Normal Affect, Normal Mood





- Skin


Skin Exam: Dry, Erythema, Intact, Normal Color, Warm





Discharge Plan





- Discharge Medications


Prescriptions: 


Apixaban [Eliquis] 5 mg PO BID 30 Days #60 tab


Lactobacillus Acidophilus [Bacid Acidophilus] 1 cap PO BID 21 Days #42 cap


levoFLOXacin [Levaquin] 500 mg PO DAILY 14 Days #14 tab





- Follow Up Plan


Condition: GOOD


Disposition: HOME/ ROUTINE


Additional Instructions: 


Please follow up with your primary care doctor, Dr. Van, within 3-5 days 

regarding a follow up ultrasound of the left arm.


Please follow up with Dr. Pisano (Hematologist) within 1-2 week.


Please follow up with Dr. Mcleod (Infectious Disease) in 1 week. 


Upon discharge, please resume your home medications. 


Please start the anticoagulant medication, Eliquis 5mg twice a day for the next 

3 months. 


Please start antibiotics prescribed as instructed for the next 2 weeks. 


Please start the Bacid prescribed as instructed for the next 3 weeks. 


Please return to the emergency room if symptoms return.  





Referrals: 


Marv Pisano MD [Staff Provider] - 


Aries Van MD [Primary Care Provider] - 


Zeeshan Mcleod MD [Staff Provider] -

## 2018-08-27 NOTE — CP.PCM.CON
History of Present Illness





- History of Present Illness


History of Present Illness: 





Heme/Onc Consult Note for Dr. Pisano -- Pawel Lam DO PGY2





CC: PICC Line Malfunction





Patient is a 46 yo F with PMH of hypertension, gram positive coccemia, asthma, 

obesity, osteoarthritis, chronic back pain, and insomnia presents to [facility] due to 

malfunctioning PICC line. Patient was recently admitted and treated for MSSA 

bacteremia. She was subsequently discharged on 18 with a PICC line in her 

left arm for 6 weeks of outpatient antibiotics. However, on 18 patient was 

instructed to proceed to the ED due to malfunctioning PICC line and admitted to 

rule out UE DVT and for continued IV antibiotics. Patient had upper extremity 

duplex ultrasound, which revealed superficial thrombophlebitis in the left 

basilic vein above the elbow. PICC line was removed and she was placed on 

therapeutic lovenox. Hematology was consulted for discharge anticoagulation 

recommendations. Today, patient reports minimal pain, reythema, or swelling at 

prior PICC line site. Patient denies any CP, SOB, n/v/d, abdominal pain, fever, 

chills, HA, or dizziness.





PMH: hypertension, gram positive coccemia, asthma, obesity, osteoarthritis, 

chronic back pain, and insomnia


Surg: None


All: NKDA


SH: Denied tobacco, EtOH, and illicit drug use


FHx: Grandmother  from breast cancer. Grandfather  from ACS. 

Uncle had throat cancer.


Medications: Reviewed, as per MAR





PMD: Rehan








Review of Systems





- Review of Systems


All systems: reviewed and no additional remarkable complaints except (12 point 

ROS reviewed and is negative other than what is stated in HPI.)





Past Patient History





- Infectious Disease


Hx of Infectious Diseases: None





- Tetanus Immunizations


Tetanus Immunization: Unknown





- Past Social History


Smoking Status: Never Smoked





- CARDIAC


Hx Cardiac Disorders: Yes


Hx Hypertension: Yes


Hx Pacemaker: No





- PULMONARY


Hx Respiratory Disorders: Yes


Hx Asthma: Yes





- NEUROLOGICAL


Hx Neurological Disorder: No





- HEENT


Hx HEENT Problems: No





- RENAL


Hx Chronic Kidney Disease: No





- ENDOCRINE/METABOLIC


Hx Endocrine Disorders: No





- HEMATOLOGICAL/ONCOLOGICAL


Hx Cancer: No





- INTEGUMENTARY


Hx Dermatological Problems: Yes


Other/Comment: bruising multiple left arm





- MUSCULOSKELETAL/RHEUMATOLOGICAL


Hx Falls: No





- GASTROINTESTINAL


Hx Gastrointestinal Disorders: Yes (obese)


Hx Gastroesophageal Reflux: Yes





- GENITOURINARY/GYNECOLOGICAL


Hx Genitourinary Disorders: No





- PSYCHIATRIC


Hx Psychophysiologic Disorder: Yes


Hx Anxiety: Yes


Hx Depression: Yes (loss of  2018)


Hx Emotional Abuse: No


Hx Physical Abuse: No


Hx Substance Use: No


Other/Comment: previous suicide attempt 2018 pills;





- SURGICAL HISTORY


Hx Mastectomy: No





- ANESTHESIA


Hx Anesthesia: No


Hx Anesthesia Reactions: No





Meds


Allergies/Adverse Reactions: 


 Allergies











Allergy/AdvReac Type Severity Reaction Status Date / Time


 


No Known Allergies Allergy   Verified 18 11:56














- Medications


Medications: 


 Current Medications





Amitriptyline HCl (Elavil)  25 mg PO HS [facility]


   Last Admin: 18 21:14 Dose:  25 mg


Amlodipine Besylate (Norvasc)  5 mg PO DAILY [facility]


   Last Admin: 18 10:07 Dose:  5 mg


Arformoterol Tartrate (Brovana)  15 mcg IH [id number] [facility]


   Last Admin: 18 07:18 Dose:  15 mcg


Atenolol (Tenormin)  25 mg PO BID [facility]


   Last Admin: 18 10:07 Dose:  25 mg


Budesonide (Pulmicort Respules)  0.5 mg IH [id number] [facility]


   Last Admin: 18 07:18 Dose:  0.5 mg


Docusate Sodium (Colace)  100 mg PO DAILY [facility]


   Last Admin: 18 10:07 Dose:  100 mg


Enoxaparin Sodium (Lovenox)  100 mg SC Q12H [facility]


   PRN Reason: Protocol


   Last Admin: 18 05:07 Dose:  100 mg


Cefazolin Sodium/Dextrose (Ancef Iv 2 Gm Duplex)  2 gm in 50 mls @ 50 mls/hr 

IVPB Q8 [facility]


   Last Admin: 18 05:08 Dose:  50 mls/hr


Lorazepam (Ativan)  0.5 mg PO BID PRN; Protocol


   PRN Reason: Anxiety


   Last Admin: 18 19:11 Dose:  0.5 mg


Losartan Potassium (Cozaar)  100 mg PO DAILY [facility]


   Last Admin: 18 10:06 Dose:  100 mg


Montelukast Sodium (Singulair)  10 mg PO DAILY [facility]


   Last Admin: 18 10:07 Dose:  10 mg


Oxycodone HCl (Oxycodone Immediate Release Tab)  30 mg PO Q8H PRN


   PRN Reason: Pain, severe (8-10)


   Last Admin: 18 05:07 Dose:  30 mg


Pantoprazole Sodium (Protonix Ec Tab)  40 mg PO ACB [facility]


   Last Admin: 18 10:07 Dose:  40 mg


Triamterene/HCTZ (Dyazide 25 Mg-37.5 Mg)  1 cap PO DAILY [facility]


   Last Admin: 18 10:06 Dose:  1 cap


Zolpidem Tartrate (Ambien)  5 mg PO HS PRN; Protocol


   PRN Reason: Insomnia


   Last Admin: 18 21:14 Dose:  5 mg











Physical Exam





- Constitutional


Appears: No Acute Distress





- Head Exam


Head Exam: NORMAL INSPECTION





- Eye Exam


Eye Exam: Normal appearance





- ENT Exam


ENT Exam: Mucous Membranes Moist, Normal Exam





- Neck Exam


Neck exam: Positive for: Normal Inspection





- Respiratory Exam


Respiratory Exam: Clear to Auscultation Bilateral.  absent: Rales, Rhonchi, 

Wheezes





- Cardiovascular Exam


Cardiovascular Exam: RRR, +S1, +S2.  absent: Gallop, Rubs, Systolic Murmur





- GI/Abdominal Exam


GI & Abdominal Exam: Soft.  absent: Guarding, Rebound, Tenderness





- Extremities Exam


Additional comments: 





No swelling, erythema, discharge, ecchymosis at site of PICC line





- Back Exam


Back exam: NORMAL INSPECTION





- Neurological Exam


Neurological exam: Alert, CN II-XII Intact, Oriented x3





- Psychiatric Exam


Psychiatric exam: Normal Affect, Normal Mood





- Skin


Skin Exam: Dry, Intact, Normal Color, Warm





Results





- Vital Signs


Recent Vital Signs: 


 Last Vital Signs











Temp  98.4 F   18 06:00


 


Pulse  72   18 06:00


 


Resp  20   18 06:00


 


BP  158/93 H  18 06:00


 


Pulse Ox  100   18 06:00














- Labs


Result Diagrams: 


 18 06:30





 18 06:30


Labs: 


 Laboratory Results - last 24 hr











  18





  07:00 07:00 06:30


 


WBC    10.8


 


RBC    3.42 L


 


Hgb    9.5 L


 


Hct    30.2 L


 


MCV    88.3


 


MCH    27.8


 


MCHC    31.5


 


RDW    15.8 H


 


Plt Count    454 H


 


MPV    8.7


 


Gran %    68.6 H


 


Lymph % (Auto)    23.8


 


Mono % (Auto)    5.8


 


Eos % (Auto)    1.3 L


 


Baso % (Auto)    0.5


 


Gran #    7.42 H


 


Lymph # (Auto)    2.6


 


Mono # (Auto)    0.6


 


Eos # (Auto)    0.1


 


Baso # (Auto)    0.05


 


ESR  61 H  


 


Sodium   


 


Potassium   


 


Chloride   


 


Carbon Dioxide   


 


Anion Gap   


 


BUN   


 


Creatinine   


 


Est GFR ( Amer)   


 


Est GFR (Non-Af Amer)   


 


Random Glucose   


 


Calcium   


 


Total Bilirubin   


 


AST   


 


ALT   


 


Alkaline Phosphatase   


 


C-Reactive Protein   32.30 H 


 


Total Protein   


 


Albumin   


 


Globulin   


 


Albumin/Globulin Ratio   














  18





  06:30


 


WBC 


 


RBC 


 


Hgb 


 


Hct 


 


MCV 


 


MCH 


 


MCHC 


 


RDW 


 


Plt Count 


 


MPV 


 


Gran % 


 


Lymph % (Auto) 


 


Mono % (Auto) 


 


Eos % (Auto) 


 


Baso % (Auto) 


 


Gran # 


 


Lymph # (Auto) 


 


Mono # (Auto) 


 


Eos # (Auto) 


 


Baso # (Auto) 


 


ESR 


 


Sodium  145


 


Potassium  4.6


 


Chloride  101


 


Carbon Dioxide  32


 


Anion Gap  17


 


BUN  9


 


Creatinine  0.9


 


Est GFR ( Amer)  > 60


 


Est GFR (Non-Af Amer)  > 60


 


Random Glucose  103


 


Calcium  9.7


 


Total Bilirubin  0.4


 


AST  51 H D


 


ALT  18


 


Alkaline Phosphatase  99


 


C-Reactive Protein 


 


Total Protein  7.1


 


Albumin  3.9


 


Globulin  3.2


 


Albumin/Globulin Ratio  1.2














Assessment & Plan





- Assessment and Plan (Free Text)


Assessment: 





46 yo F with PMH of hypertension, gram positive coccemia, asthma, obesity, 

osteoarthritis, chronic back pain, and insomnia presents with malfunctioning 

PICC line. Patient also with normocytic anemia likely 2/2 chronic disease. 

Hematology consulted for outpatient anticoagulation recommendations.





Plan: 





- UE US showed thrombus in L basilic vein above the elbow


- Recommend repeat UE US 3-5 days after initial US


- Recommend Eliquis 5 mg BID for at least 3 months


- Hgb stable, continue to monitor


- Antibiotics per ID


- Medical management per primary





Case discussed with Dr. Pisano.


Pawel Lam, DO PGY2